# Patient Record
Sex: FEMALE | Race: WHITE | Employment: OTHER
[De-identification: names, ages, dates, MRNs, and addresses within clinical notes are randomized per-mention and may not be internally consistent; named-entity substitution may affect disease eponyms.]

---

## 2017-01-26 DIAGNOSIS — I10 HTN (HYPERTENSION): ICD-10-CM

## 2017-01-26 RX ORDER — PROPRANOLOL HYDROCHLORIDE 60 MG/1
TABLET ORAL
Qty: 90 TABLET | Refills: 0 | Status: SHIPPED | OUTPATIENT
Start: 2017-01-26 | End: 2017-03-07

## 2017-02-07 ENCOUNTER — TELEPHONE (OUTPATIENT)
Dept: FAMILY MEDICINE CLINIC | Facility: CLINIC | Age: 82
End: 2017-02-07

## 2017-02-20 DIAGNOSIS — R44.3 HALLUCINATIONS: ICD-10-CM

## 2017-02-20 RX ORDER — OLANZAPINE 5 MG/1
TABLET ORAL
Qty: 90 TABLET | Refills: 1 | Status: SHIPPED | OUTPATIENT
Start: 2017-02-20 | End: 2017-05-23 | Stop reason: SDUPTHER

## 2017-03-07 ENCOUNTER — OFFICE VISIT (OUTPATIENT)
Dept: FAMILY MEDICINE CLINIC | Facility: CLINIC | Age: 82
End: 2017-03-07

## 2017-03-07 VITALS
WEIGHT: 150 LBS | HEIGHT: 66 IN | SYSTOLIC BLOOD PRESSURE: 118 MMHG | DIASTOLIC BLOOD PRESSURE: 82 MMHG | OXYGEN SATURATION: 96 % | BODY MASS INDEX: 24.11 KG/M2 | HEART RATE: 59 BPM | TEMPERATURE: 96.3 F

## 2017-03-07 DIAGNOSIS — E55.9 VITAMIN D DEFICIENCY: ICD-10-CM

## 2017-03-07 DIAGNOSIS — I10 ESSENTIAL HYPERTENSION: ICD-10-CM

## 2017-03-07 DIAGNOSIS — E78.2 MIXED HYPERLIPIDEMIA: Primary | ICD-10-CM

## 2017-03-07 DIAGNOSIS — E03.9 HYPOTHYROIDISM, UNSPECIFIED TYPE: ICD-10-CM

## 2017-03-07 PROCEDURE — G8427 DOCREV CUR MEDS BY ELIG CLIN: HCPCS | Performed by: NURSE PRACTITIONER

## 2017-03-07 PROCEDURE — 1036F TOBACCO NON-USER: CPT | Performed by: NURSE PRACTITIONER

## 2017-03-07 PROCEDURE — G8484 FLU IMMUNIZE NO ADMIN: HCPCS | Performed by: NURSE PRACTITIONER

## 2017-03-07 PROCEDURE — G8420 CALC BMI NORM PARAMETERS: HCPCS | Performed by: NURSE PRACTITIONER

## 2017-03-07 PROCEDURE — 4040F PNEUMOC VAC/ADMIN/RCVD: CPT | Performed by: NURSE PRACTITIONER

## 2017-03-07 PROCEDURE — G8399 PT W/DXA RESULTS DOCUMENT: HCPCS | Performed by: NURSE PRACTITIONER

## 2017-03-07 PROCEDURE — 1090F PRES/ABSN URINE INCON ASSESS: CPT | Performed by: NURSE PRACTITIONER

## 2017-03-07 PROCEDURE — 99213 OFFICE O/P EST LOW 20 MIN: CPT | Performed by: NURSE PRACTITIONER

## 2017-03-07 PROCEDURE — 1123F ACP DISCUSS/DSCN MKR DOCD: CPT | Performed by: NURSE PRACTITIONER

## 2017-03-07 ASSESSMENT — ENCOUNTER SYMPTOMS
SHORTNESS OF BREATH: 0
ABDOMINAL PAIN: 0
NAUSEA: 0
CHEST TIGHTNESS: 0
VOMITING: 0
COUGH: 0

## 2017-03-14 ENCOUNTER — APPOINTMENT (OUTPATIENT)
Dept: GENERAL RADIOLOGY | Age: 82
End: 2017-03-14
Payer: MEDICARE

## 2017-03-14 ENCOUNTER — HOSPITAL ENCOUNTER (EMERGENCY)
Age: 82
Discharge: HOME | End: 2017-03-14
Attending: EMERGENCY MEDICINE
Payer: MEDICARE

## 2017-03-14 ENCOUNTER — APPOINTMENT (OUTPATIENT)
Dept: CT IMAGING | Age: 82
End: 2017-03-14
Payer: MEDICARE

## 2017-03-14 VITALS
WEIGHT: 150 LBS | TEMPERATURE: 97.8 F | HEART RATE: 79 BPM | BODY MASS INDEX: 24.11 KG/M2 | DIASTOLIC BLOOD PRESSURE: 67 MMHG | OXYGEN SATURATION: 98 % | SYSTOLIC BLOOD PRESSURE: 138 MMHG | RESPIRATION RATE: 16 BRPM | HEIGHT: 66 IN

## 2017-03-14 DIAGNOSIS — K63.89 COLON DISTENTION: ICD-10-CM

## 2017-03-14 DIAGNOSIS — K20.90 ESOPHAGITIS: Primary | ICD-10-CM

## 2017-03-14 DIAGNOSIS — K80.50 BILIARY COLIC: ICD-10-CM

## 2017-03-14 LAB
% CKMB: 5.8 % (ref 0–3)
ABSOLUTE EOS #: 0 K/UL (ref 0–0.4)
ABSOLUTE LYMPH #: 1.1 K/UL (ref 1–4.8)
ABSOLUTE MONO #: 0.7 K/UL (ref 0.2–0.8)
ALBUMIN SERPL-MCNC: 3.8 G/DL (ref 3.5–5.2)
ALBUMIN/GLOBULIN RATIO: ABNORMAL (ref 1–2.5)
ALP BLD-CCNC: 76 U/L (ref 35–104)
ALT SERPL-CCNC: 66 U/L (ref 5–33)
AMYLASE: 90 U/L (ref 28–100)
ANION GAP SERPL CALCULATED.3IONS-SCNC: 14 MMOL/L (ref 9–17)
AST SERPL-CCNC: 37 U/L
BASOPHILS # BLD: 0 % (ref 0–2)
BASOPHILS ABSOLUTE: 0 K/UL (ref 0–0.2)
BILIRUB SERPL-MCNC: 0.3 MG/DL (ref 0.3–1.2)
BILIRUBIN DIRECT: <0.08 MG/DL
BILIRUBIN URINE: NEGATIVE
BILIRUBIN, INDIRECT: ABNORMAL MG/DL (ref 0–1)
BUN BLDV-MCNC: 25 MG/DL (ref 8–23)
BUN/CREAT BLD: 36 (ref 9–20)
CALCIUM SERPL-MCNC: 10.5 MG/DL (ref 8.6–10.4)
CHLORIDE BLD-SCNC: 103 MMOL/L (ref 98–107)
CK MB: 2.3 NG/ML
CKMB INTERPRETATION: ABNORMAL
CO2: 20 MMOL/L (ref 20–31)
COLOR: YELLOW
COMMENT UA: NORMAL
CREAT SERPL-MCNC: 0.7 MG/DL (ref 0.5–0.9)
DIFFERENTIAL TYPE: ABNORMAL
EOSINOPHILS RELATIVE PERCENT: 0 % (ref 1–4)
GFR AFRICAN AMERICAN: >60 ML/MIN
GFR NON-AFRICAN AMERICAN: >60 ML/MIN
GFR SERPL CREATININE-BSD FRML MDRD: ABNORMAL ML/MIN/{1.73_M2}
GFR SERPL CREATININE-BSD FRML MDRD: ABNORMAL ML/MIN/{1.73_M2}
GLOBULIN: ABNORMAL G/DL (ref 1.5–3.8)
GLUCOSE BLD-MCNC: 145 MG/DL (ref 70–99)
GLUCOSE URINE: NEGATIVE
HCT VFR BLD CALC: 40.3 % (ref 36–46)
HEMOGLOBIN: 13.7 G/DL (ref 12–16)
KETONES, URINE: NEGATIVE
LACTIC ACID: 0.7 MMOL/L (ref 0.5–2.2)
LEUKOCYTE ESTERASE, URINE: NEGATIVE
LIPASE: 24 U/L (ref 13–60)
LYMPHOCYTES # BLD: 18 % (ref 24–44)
MCH RBC QN AUTO: 32.3 PG (ref 26–34)
MCHC RBC AUTO-ENTMCNC: 33.9 G/DL (ref 31–37)
MCV RBC AUTO: 95.2 FL (ref 80–100)
MONOCYTES # BLD: 11 % (ref 1–7)
MYOGLOBIN: 40 NG/ML (ref 25–58)
NITRITE, URINE: NEGATIVE
PDW BLD-RTO: 12.6 % (ref 11.5–14.5)
PH UA: 5 (ref 5–8)
PLATELET # BLD: 219 K/UL (ref 130–400)
PLATELET ESTIMATE: ABNORMAL
PMV BLD AUTO: 8.1 FL (ref 6–12)
POTASSIUM SERPL-SCNC: 5 MMOL/L (ref 3.7–5.3)
PROTEIN UA: NEGATIVE
RBC # BLD: 4.24 M/UL (ref 4–5.2)
RBC # BLD: ABNORMAL 10*6/UL
SEG NEUTROPHILS: 71 % (ref 36–66)
SEGMENTED NEUTROPHILS ABSOLUTE COUNT: 4.5 K/UL (ref 1.8–7.7)
SODIUM BLD-SCNC: 137 MMOL/L (ref 135–144)
SPECIFIC GRAVITY UA: 1.01 (ref 1–1.03)
TOTAL CK: 40 U/L (ref 26–192)
TOTAL PROTEIN: 6.7 G/DL (ref 6.4–8.3)
TROPONIN INTERP: NORMAL
TROPONIN T: <0.03 NG/ML
TURBIDITY: CLEAR
URINE HGB: NEGATIVE
UROBILINOGEN, URINE: NORMAL
WBC # BLD: 6.3 K/UL (ref 3.5–11)
WBC # BLD: ABNORMAL 10*3/UL

## 2017-03-14 PROCEDURE — 83690 ASSAY OF LIPASE: CPT

## 2017-03-14 PROCEDURE — 74176 CT ABD & PELVIS W/O CONTRAST: CPT

## 2017-03-14 PROCEDURE — 96374 THER/PROPH/DIAG INJ IV PUSH: CPT

## 2017-03-14 PROCEDURE — 6370000000 HC RX 637 (ALT 250 FOR IP): Performed by: EMERGENCY MEDICINE

## 2017-03-14 PROCEDURE — 84484 ASSAY OF TROPONIN QUANT: CPT

## 2017-03-14 PROCEDURE — 83874 ASSAY OF MYOGLOBIN: CPT

## 2017-03-14 PROCEDURE — 71010 XR CHEST PORTABLE: CPT

## 2017-03-14 PROCEDURE — 85025 COMPLETE CBC W/AUTO DIFF WBC: CPT

## 2017-03-14 PROCEDURE — C9113 INJ PANTOPRAZOLE SODIUM, VIA: HCPCS | Performed by: EMERGENCY MEDICINE

## 2017-03-14 PROCEDURE — 83605 ASSAY OF LACTIC ACID: CPT

## 2017-03-14 PROCEDURE — 2580000003 HC RX 258: Performed by: EMERGENCY MEDICINE

## 2017-03-14 PROCEDURE — 82550 ASSAY OF CK (CPK): CPT

## 2017-03-14 PROCEDURE — 81003 URINALYSIS AUTO W/O SCOPE: CPT

## 2017-03-14 PROCEDURE — 82553 CREATINE MB FRACTION: CPT

## 2017-03-14 PROCEDURE — 82150 ASSAY OF AMYLASE: CPT

## 2017-03-14 PROCEDURE — 80048 BASIC METABOLIC PNL TOTAL CA: CPT

## 2017-03-14 PROCEDURE — 93005 ELECTROCARDIOGRAM TRACING: CPT

## 2017-03-14 PROCEDURE — 6360000002 HC RX W HCPCS: Performed by: EMERGENCY MEDICINE

## 2017-03-14 PROCEDURE — 96375 TX/PRO/DX INJ NEW DRUG ADDON: CPT

## 2017-03-14 PROCEDURE — 99285 EMERGENCY DEPT VISIT HI MDM: CPT

## 2017-03-14 PROCEDURE — 87086 URINE CULTURE/COLONY COUNT: CPT

## 2017-03-14 PROCEDURE — 80076 HEPATIC FUNCTION PANEL: CPT

## 2017-03-14 RX ORDER — ALUMINA, MAGNESIA, AND SIMETHICONE 2400; 2400; 240 MG/30ML; MG/30ML; MG/30ML
30 SUSPENSION ORAL EVERY 6 HOURS
Qty: 1 BOTTLE | Refills: 0 | Status: SHIPPED | OUTPATIENT
Start: 2017-03-14 | End: 2017-05-23 | Stop reason: ALTCHOICE

## 2017-03-14 RX ORDER — HYDRALAZINE HYDROCHLORIDE 20 MG/ML
5 INJECTION INTRAMUSCULAR; INTRAVENOUS ONCE
Status: COMPLETED | OUTPATIENT
Start: 2017-03-14 | End: 2017-03-14

## 2017-03-14 RX ORDER — 0.9 % SODIUM CHLORIDE 0.9 %
10 VIAL (ML) INJECTION ONCE
Status: COMPLETED | OUTPATIENT
Start: 2017-03-14 | End: 2017-03-14

## 2017-03-14 RX ORDER — MORPHINE SULFATE 4 MG/ML
4 INJECTION, SOLUTION INTRAMUSCULAR; INTRAVENOUS ONCE
Status: COMPLETED | OUTPATIENT
Start: 2017-03-14 | End: 2017-03-14

## 2017-03-14 RX ORDER — OMEPRAZOLE 40 MG/1
40 CAPSULE, DELAYED RELEASE ORAL DAILY
Qty: 30 CAPSULE | Refills: 0 | Status: SHIPPED | OUTPATIENT
Start: 2017-03-14 | End: 2017-05-23 | Stop reason: ALTCHOICE

## 2017-03-14 RX ORDER — ONDANSETRON 2 MG/ML
4 INJECTION INTRAMUSCULAR; INTRAVENOUS ONCE
Status: COMPLETED | OUTPATIENT
Start: 2017-03-14 | End: 2017-03-14

## 2017-03-14 RX ORDER — SUCRALFATE ORAL 1 G/10ML
1 SUSPENSION ORAL 4 TIMES DAILY
Qty: 1200 ML | Refills: 3 | Status: SHIPPED | OUTPATIENT
Start: 2017-03-14 | End: 2017-05-23 | Stop reason: ALTCHOICE

## 2017-03-14 RX ORDER — SUCRALFATE ORAL 1 G/10ML
1 SUSPENSION ORAL ONCE
Status: COMPLETED | OUTPATIENT
Start: 2017-03-14 | End: 2017-03-14

## 2017-03-14 RX ORDER — PANTOPRAZOLE SODIUM 40 MG/10ML
40 INJECTION, POWDER, LYOPHILIZED, FOR SOLUTION INTRAVENOUS ONCE
Status: COMPLETED | OUTPATIENT
Start: 2017-03-14 | End: 2017-03-14

## 2017-03-14 RX ORDER — ONDANSETRON 4 MG/1
4 TABLET, ORALLY DISINTEGRATING ORAL EVERY 8 HOURS PRN
Qty: 20 TABLET | Refills: 0 | Status: SHIPPED | OUTPATIENT
Start: 2017-03-14 | End: 2017-05-23 | Stop reason: ALTCHOICE

## 2017-03-14 RX ADMIN — HYDRALAZINE HYDROCHLORIDE 5 MG: 20 INJECTION INTRAMUSCULAR; INTRAVENOUS at 05:54

## 2017-03-14 RX ADMIN — ONDANSETRON 4 MG: 2 INJECTION INTRAMUSCULAR; INTRAVENOUS at 05:53

## 2017-03-14 RX ADMIN — Medication 30 ML: at 06:56

## 2017-03-14 RX ADMIN — PANTOPRAZOLE SODIUM 40 MG: 40 INJECTION, POWDER, FOR SOLUTION INTRAVENOUS at 05:53

## 2017-03-14 RX ADMIN — MORPHINE SULFATE 4 MG: 4 INJECTION, SOLUTION INTRAMUSCULAR; INTRAVENOUS at 05:53

## 2017-03-14 RX ADMIN — Medication 10 ML: at 05:53

## 2017-03-14 RX ADMIN — SUCRALFATE 1 G: 1 SUSPENSION ORAL at 06:56

## 2017-03-14 ASSESSMENT — ENCOUNTER SYMPTOMS
NAUSEA: 1
ABDOMINAL PAIN: 1
RESPIRATORY NEGATIVE: 1

## 2017-03-14 ASSESSMENT — PAIN SCALES - GENERAL
PAINLEVEL_OUTOF10: 4
PAINLEVEL_OUTOF10: 2
PAINLEVEL_OUTOF10: 4

## 2017-03-15 LAB
CULTURE: NORMAL
CULTURE: NORMAL
Lab: NORMAL
SPECIMEN DESCRIPTION: NORMAL
SPECIMEN DESCRIPTION: NORMAL
STATUS: NORMAL

## 2017-03-16 LAB
EKG ATRIAL RATE: 74 BPM
EKG P AXIS: 75 DEGREES
EKG P-R INTERVAL: 160 MS
EKG Q-T INTERVAL: 356 MS
EKG QRS DURATION: 90 MS
EKG QTC CALCULATION (BAZETT): 395 MS
EKG R AXIS: 60 DEGREES
EKG T AXIS: 59 DEGREES
EKG VENTRICULAR RATE: 74 BPM

## 2017-03-28 DIAGNOSIS — E03.9 HYPOTHYROID: ICD-10-CM

## 2017-03-28 RX ORDER — LEVOTHYROXINE SODIUM 0.12 MG/1
TABLET ORAL
Qty: 90 TABLET | Refills: 0 | Status: SHIPPED | OUTPATIENT
Start: 2017-03-28 | End: 2017-05-23 | Stop reason: SDUPTHER

## 2017-04-17 DIAGNOSIS — E78.2 MIXED HYPERLIPIDEMIA: ICD-10-CM

## 2017-04-18 RX ORDER — ATORVASTATIN CALCIUM 10 MG/1
TABLET, FILM COATED ORAL
Qty: 90 TABLET | Refills: 0 | Status: SHIPPED | OUTPATIENT
Start: 2017-04-18 | End: 2017-07-20 | Stop reason: SDUPTHER

## 2017-05-01 DIAGNOSIS — I10 HTN (HYPERTENSION): ICD-10-CM

## 2017-05-02 RX ORDER — PROPRANOLOL HYDROCHLORIDE 60 MG/1
TABLET ORAL
Qty: 90 TABLET | Refills: 0 | Status: SHIPPED | OUTPATIENT
Start: 2017-05-02 | End: 2017-05-23 | Stop reason: SDUPTHER

## 2017-05-23 ENCOUNTER — OFFICE VISIT (OUTPATIENT)
Dept: FAMILY MEDICINE CLINIC | Age: 82
End: 2017-05-23
Payer: MEDICARE

## 2017-05-23 VITALS
HEART RATE: 60 BPM | SYSTOLIC BLOOD PRESSURE: 116 MMHG | DIASTOLIC BLOOD PRESSURE: 67 MMHG | TEMPERATURE: 97.4 F | WEIGHT: 146.6 LBS | HEIGHT: 66 IN | BODY MASS INDEX: 23.56 KG/M2 | OXYGEN SATURATION: 98 %

## 2017-05-23 DIAGNOSIS — C44.519 BASAL CELL CARCINOMA OF BACK: ICD-10-CM

## 2017-05-23 DIAGNOSIS — R44.1 HALLUCINATIONS, VISUAL: ICD-10-CM

## 2017-05-23 DIAGNOSIS — E53.8 VITAMIN B12 DEFICIENCY: ICD-10-CM

## 2017-05-23 DIAGNOSIS — I10 ESSENTIAL HYPERTENSION: ICD-10-CM

## 2017-05-23 DIAGNOSIS — E55.9 VITAMIN D DEFICIENCY: ICD-10-CM

## 2017-05-23 DIAGNOSIS — Z00.00 MEDICARE ANNUAL WELLNESS VISIT, SUBSEQUENT: Primary | ICD-10-CM

## 2017-05-23 DIAGNOSIS — Z23 NEED FOR VACCINATION WITH 13-POLYVALENT PNEUMOCOCCAL CONJUGATE VACCINE: ICD-10-CM

## 2017-05-23 DIAGNOSIS — E78.2 MIXED HYPERLIPIDEMIA: ICD-10-CM

## 2017-05-23 DIAGNOSIS — Z87.19 HISTORY OF GALLSTONES: ICD-10-CM

## 2017-05-23 DIAGNOSIS — E03.9 HYPOTHYROIDISM, UNSPECIFIED TYPE: ICD-10-CM

## 2017-05-23 DIAGNOSIS — M19.012 ARTHRITIS OF LEFT SHOULDER REGION: ICD-10-CM

## 2017-05-23 DIAGNOSIS — J44.9 COPD, MILD (HCC): ICD-10-CM

## 2017-05-23 PROCEDURE — G0009 ADMIN PNEUMOCOCCAL VACCINE: HCPCS | Performed by: NURSE PRACTITIONER

## 2017-05-23 PROCEDURE — G0439 PPPS, SUBSEQ VISIT: HCPCS | Performed by: NURSE PRACTITIONER

## 2017-05-23 PROCEDURE — 90670 PCV13 VACCINE IM: CPT | Performed by: NURSE PRACTITIONER

## 2017-05-23 RX ORDER — PROPRANOLOL HYDROCHLORIDE 60 MG/1
TABLET ORAL
Qty: 90 TABLET | Refills: 3 | Status: SHIPPED | OUTPATIENT
Start: 2017-05-23 | End: 2017-12-07 | Stop reason: ALTCHOICE

## 2017-05-23 RX ORDER — BENZTROPINE MESYLATE 0.5 MG/1
TABLET ORAL
Qty: 180 TABLET | Refills: 3 | Status: SHIPPED | OUTPATIENT
Start: 2017-05-23 | End: 2018-07-01 | Stop reason: SDUPTHER

## 2017-05-23 RX ORDER — LEVOTHYROXINE SODIUM 0.12 MG/1
TABLET ORAL
Qty: 90 TABLET | Refills: 3 | Status: SHIPPED | OUTPATIENT
Start: 2017-05-23 | End: 2018-07-01 | Stop reason: SDUPTHER

## 2017-05-23 RX ORDER — OLANZAPINE 5 MG/1
TABLET ORAL
Qty: 90 TABLET | Refills: 3 | Status: SHIPPED | OUTPATIENT
Start: 2017-05-23 | End: 2017-12-07

## 2017-05-23 ASSESSMENT — ENCOUNTER SYMPTOMS
CONSTIPATION: 0
COUGH: 0
EYE PAIN: 0
BACK PAIN: 0
WHEEZING: 0
BLOOD IN STOOL: 0
ABDOMINAL PAIN: 0
DIARRHEA: 0
SHORTNESS OF BREATH: 0

## 2017-05-23 ASSESSMENT — PATIENT HEALTH QUESTIONNAIRE - PHQ9: SUM OF ALL RESPONSES TO PHQ QUESTIONS 1-9: 0

## 2017-05-23 ASSESSMENT — ANXIETY QUESTIONNAIRES: GAD7 TOTAL SCORE: 0

## 2017-07-20 DIAGNOSIS — E78.2 MIXED HYPERLIPIDEMIA: ICD-10-CM

## 2017-07-20 RX ORDER — ATORVASTATIN CALCIUM 10 MG/1
TABLET, FILM COATED ORAL
Qty: 90 TABLET | Refills: 0 | Status: SHIPPED | OUTPATIENT
Start: 2017-07-20 | End: 2017-10-15 | Stop reason: SDUPTHER

## 2017-08-31 ENCOUNTER — OFFICE VISIT (OUTPATIENT)
Dept: FAMILY MEDICINE CLINIC | Age: 82
End: 2017-08-31
Payer: MEDICARE

## 2017-08-31 VITALS
HEART RATE: 62 BPM | OXYGEN SATURATION: 98 % | DIASTOLIC BLOOD PRESSURE: 62 MMHG | BODY MASS INDEX: 23.56 KG/M2 | TEMPERATURE: 98.1 F | RESPIRATION RATE: 18 BRPM | SYSTOLIC BLOOD PRESSURE: 98 MMHG | WEIGHT: 146.61 LBS | HEIGHT: 66 IN

## 2017-08-31 DIAGNOSIS — R30.0 DYSURIA: ICD-10-CM

## 2017-08-31 DIAGNOSIS — N39.0 URINARY TRACT INFECTION WITH HEMATURIA, SITE UNSPECIFIED: Primary | ICD-10-CM

## 2017-08-31 DIAGNOSIS — R31.9 URINARY TRACT INFECTION WITH HEMATURIA, SITE UNSPECIFIED: Primary | ICD-10-CM

## 2017-08-31 LAB
BILIRUBIN, POC: ABNORMAL
BLOOD URINE, POC: ABNORMAL
CLARITY, POC: ABNORMAL
COLOR, POC: YELLOW
GLUCOSE URINE, POC: ABNORMAL
KETONES, POC: ABNORMAL
LEUKOCYTE EST, POC: ABNORMAL
NITRITE, POC: POSITIVE
PH, POC: 5.5
PROTEIN, POC: ABNORMAL
SPECIFIC GRAVITY, POC: 1.01
UROBILINOGEN, POC: 0.2

## 2017-08-31 PROCEDURE — 1090F PRES/ABSN URINE INCON ASSESS: CPT | Performed by: NURSE PRACTITIONER

## 2017-08-31 PROCEDURE — 99213 OFFICE O/P EST LOW 20 MIN: CPT | Performed by: NURSE PRACTITIONER

## 2017-08-31 PROCEDURE — G8399 PT W/DXA RESULTS DOCUMENT: HCPCS | Performed by: NURSE PRACTITIONER

## 2017-08-31 PROCEDURE — 1036F TOBACCO NON-USER: CPT | Performed by: NURSE PRACTITIONER

## 2017-08-31 PROCEDURE — G8420 CALC BMI NORM PARAMETERS: HCPCS | Performed by: NURSE PRACTITIONER

## 2017-08-31 PROCEDURE — 81003 URINALYSIS AUTO W/O SCOPE: CPT | Performed by: NURSE PRACTITIONER

## 2017-08-31 PROCEDURE — 4040F PNEUMOC VAC/ADMIN/RCVD: CPT | Performed by: NURSE PRACTITIONER

## 2017-08-31 PROCEDURE — 1123F ACP DISCUSS/DSCN MKR DOCD: CPT | Performed by: NURSE PRACTITIONER

## 2017-08-31 PROCEDURE — G8427 DOCREV CUR MEDS BY ELIG CLIN: HCPCS | Performed by: NURSE PRACTITIONER

## 2017-08-31 RX ORDER — CIPROFLOXACIN 500 MG/1
500 TABLET, FILM COATED ORAL 2 TIMES DAILY
Qty: 14 TABLET | Refills: 0 | Status: SHIPPED | OUTPATIENT
Start: 2017-08-31 | End: 2017-09-07

## 2017-08-31 ASSESSMENT — ENCOUNTER SYMPTOMS
BLOOD IN STOOL: 0
ABDOMINAL PAIN: 0
ABDOMINAL DISTENTION: 0
WHEEZING: 0
VOMITING: 0
CHEST TIGHTNESS: 0
COUGH: 0
NAUSEA: 0
SHORTNESS OF BREATH: 0

## 2017-11-21 ENCOUNTER — TELEPHONE (OUTPATIENT)
Dept: FAMILY MEDICINE CLINIC | Age: 82
End: 2017-11-21

## 2017-11-24 ENCOUNTER — TELEPHONE (OUTPATIENT)
Dept: FAMILY MEDICINE CLINIC | Age: 82
End: 2017-11-24

## 2017-11-24 NOTE — TELEPHONE ENCOUNTER
Jose Roberto Ritter RN from [facility] called to report they would be following pt since her hospital stay and to give vitals today. /58 Pulse 58    Pt has follow up with the office scheduled for 11/30/2017.

## 2017-11-28 ENCOUNTER — TELEPHONE (OUTPATIENT)
Dept: FAMILY MEDICINE CLINIC | Age: 82
End: 2017-11-28

## 2017-12-07 ENCOUNTER — OFFICE VISIT (OUTPATIENT)
Dept: FAMILY MEDICINE CLINIC | Age: 82
End: 2017-12-07
Payer: MEDICARE

## 2017-12-07 VITALS
SYSTOLIC BLOOD PRESSURE: 137 MMHG | OXYGEN SATURATION: 96 % | HEIGHT: 66 IN | WEIGHT: 137.8 LBS | HEART RATE: 69 BPM | DIASTOLIC BLOOD PRESSURE: 72 MMHG | TEMPERATURE: 95.9 F | BODY MASS INDEX: 22.14 KG/M2

## 2017-12-07 DIAGNOSIS — K29.80 DUODENITIS: ICD-10-CM

## 2017-12-07 DIAGNOSIS — I10 ESSENTIAL HYPERTENSION: Primary | ICD-10-CM

## 2017-12-07 PROCEDURE — 99213 OFFICE O/P EST LOW 20 MIN: CPT | Performed by: NURSE PRACTITIONER

## 2017-12-07 PROCEDURE — G8427 DOCREV CUR MEDS BY ELIG CLIN: HCPCS | Performed by: NURSE PRACTITIONER

## 2017-12-07 PROCEDURE — G8420 CALC BMI NORM PARAMETERS: HCPCS | Performed by: NURSE PRACTITIONER

## 2017-12-07 PROCEDURE — 1036F TOBACCO NON-USER: CPT | Performed by: NURSE PRACTITIONER

## 2017-12-07 PROCEDURE — 4040F PNEUMOC VAC/ADMIN/RCVD: CPT | Performed by: NURSE PRACTITIONER

## 2017-12-07 PROCEDURE — 1123F ACP DISCUSS/DSCN MKR DOCD: CPT | Performed by: NURSE PRACTITIONER

## 2017-12-07 PROCEDURE — 1111F DSCHRG MED/CURRENT MED MERGE: CPT | Performed by: NURSE PRACTITIONER

## 2017-12-07 PROCEDURE — G8484 FLU IMMUNIZE NO ADMIN: HCPCS | Performed by: NURSE PRACTITIONER

## 2017-12-07 PROCEDURE — 1090F PRES/ABSN URINE INCON ASSESS: CPT | Performed by: NURSE PRACTITIONER

## 2017-12-07 PROCEDURE — G8399 PT W/DXA RESULTS DOCUMENT: HCPCS | Performed by: NURSE PRACTITIONER

## 2017-12-07 RX ORDER — PANTOPRAZOLE SODIUM 40 MG/1
TABLET, DELAYED RELEASE ORAL
Refills: 0 | COMMUNITY
Start: 2017-11-25 | End: 2017-12-20 | Stop reason: SDUPTHER

## 2017-12-07 RX ORDER — FOSINOPRIL SODIUM 10 MG/1
10 TABLET ORAL DAILY
Qty: 30 TABLET | Refills: 3 | Status: SHIPPED | OUTPATIENT
Start: 2017-12-07 | End: 2018-01-16 | Stop reason: DRUGHIGH

## 2017-12-07 ASSESSMENT — ENCOUNTER SYMPTOMS
DIARRHEA: 0
NAUSEA: 0
SHORTNESS OF BREATH: 0
VOMITING: 0
COUGH: 0
CONSTIPATION: 1
ABDOMINAL PAIN: 0
BLOOD IN STOOL: 0
CHEST TIGHTNESS: 0

## 2017-12-07 NOTE — PROGRESS NOTES
Visit Information    Have you changed or started any medications since your last visit including any over-the-counter medicines, vitamins, or herbal medicines? no   Have you stopped taking any of your medications? Is so, why? -  no  Are you having any side effects from any of your medications? - no    Have you seen any other physician or provider since your last visit?  no   Have you had any other diagnostic tests since your last visit?  no   Have you been seen in the emergency room and/or had an admission in a hospital since we last saw you?  no   Have you had your routine dental cleaning in the past 6 months?  no     Do you have an active MyChart account? If no, what is the barrier?   No: na    Patient Care Team:  Tomas Cannon CNP as PCP - General (Nurse Practitioner)    Medical History Review  Past Medical, Family, and Social History reviewed and  contribute to the patient presenting condition    Health Maintenance   Topic Date Due    DTaP/Tdap/Td vaccine (1 - Tdap) 04[birth year]16/1955    Pneumococcal low/med risk (2 of 2 - PPSV23) 05/23/2018    Zostavax vaccine  Addressed    DEXA (modify frequency per FRAX score)  Addressed    Flu vaccine  Completed
Physical Exam   Constitutional: She is oriented to person, place, and time. She appears well-developed and well-nourished. No distress. HENT:   Head: Normocephalic and atraumatic. Neck: Neck supple. Cardiovascular: Normal rate, regular rhythm, normal heart sounds and intact distal pulses. No LE edema   Pulmonary/Chest: Effort normal and breath sounds normal.   Neurological: She is alert and oriented to person, place, and time. Skin: Skin is warm and dry. She is not diaphoretic. Psychiatric: She has a normal mood and affect. Her behavior is normal. Judgment and thought content normal.   Nursing note and vitals reviewed. Assessment:      1. Essential hypertension  fosinopril (MONOPRIL) 10 MG tablet    GA DISCHARGE MEDS RECONCILED W/ CURRENT OUTPATIENT MED LIST   2. Duodenitis  GA DISCHARGE MEDS RECONCILED W/ CURRENT OUTPATIENT MED LIST     BP Readings from Last 3 Encounters:   12/07/17 137/72   08/31/17 98/62   05/23/17 116/67       Plan:      Return in about 2 weeks (around 12/21/2017) for return for bp check. Orders Placed This Encounter   Procedures    GA DISCHARGE MEDS RECONCILED W/ CURRENT OUTPATIENT MED LIST     Orders Placed This Encounter   Medications    fosinopril (MONOPRIL) 10 MG tablet     Sig: Take 1 tablet by mouth daily     Dispense:  30 tablet     Refill:  3   continue to avoid BB, cut back on ACE for more stable BP, recheck 2 weeks here at office  Protein shakes given to take at least daily for protein and calories. Rise slowly and adeq. Water intake throughout the day  Continue other meds and supplements, labs due in spring. Patient given educational materials - see patient instructions. Discussed use, benefit, and side effects of prescribed medications. All patient questions answered. Pt voiced understanding. Reviewed health maintenance. Instructed to continue current medications, diet and exercise.     Electronically signed by Sarah Lynne CNP on 12/7/2017 at

## 2017-12-20 ENCOUNTER — OFFICE VISIT (OUTPATIENT)
Dept: FAMILY MEDICINE CLINIC | Age: 82
End: 2017-12-20
Payer: MEDICARE

## 2017-12-20 VITALS
HEIGHT: 66 IN | BODY MASS INDEX: 22.31 KG/M2 | OXYGEN SATURATION: 98 % | TEMPERATURE: 96.6 F | SYSTOLIC BLOOD PRESSURE: 132 MMHG | HEART RATE: 63 BPM | DIASTOLIC BLOOD PRESSURE: 60 MMHG | WEIGHT: 138.8 LBS

## 2017-12-20 DIAGNOSIS — I10 ESSENTIAL HYPERTENSION: Primary | ICD-10-CM

## 2017-12-20 DIAGNOSIS — K21.9 GASTROESOPHAGEAL REFLUX DISEASE WITHOUT ESOPHAGITIS: ICD-10-CM

## 2017-12-20 PROCEDURE — 1036F TOBACCO NON-USER: CPT | Performed by: NURSE PRACTITIONER

## 2017-12-20 PROCEDURE — 1123F ACP DISCUSS/DSCN MKR DOCD: CPT | Performed by: NURSE PRACTITIONER

## 2017-12-20 PROCEDURE — G8420 CALC BMI NORM PARAMETERS: HCPCS | Performed by: NURSE PRACTITIONER

## 2017-12-20 PROCEDURE — 1090F PRES/ABSN URINE INCON ASSESS: CPT | Performed by: NURSE PRACTITIONER

## 2017-12-20 PROCEDURE — 99213 OFFICE O/P EST LOW 20 MIN: CPT | Performed by: NURSE PRACTITIONER

## 2017-12-20 PROCEDURE — 4040F PNEUMOC VAC/ADMIN/RCVD: CPT | Performed by: NURSE PRACTITIONER

## 2017-12-20 PROCEDURE — G8484 FLU IMMUNIZE NO ADMIN: HCPCS | Performed by: NURSE PRACTITIONER

## 2017-12-20 PROCEDURE — G8427 DOCREV CUR MEDS BY ELIG CLIN: HCPCS | Performed by: NURSE PRACTITIONER

## 2017-12-20 PROCEDURE — G8399 PT W/DXA RESULTS DOCUMENT: HCPCS | Performed by: NURSE PRACTITIONER

## 2017-12-20 RX ORDER — PANTOPRAZOLE SODIUM 40 MG/1
TABLET, DELAYED RELEASE ORAL
Qty: 30 TABLET | Refills: 5 | Status: SHIPPED | OUTPATIENT
Start: 2017-12-20 | End: 2018-04-16 | Stop reason: SDUPTHER

## 2017-12-20 ASSESSMENT — ENCOUNTER SYMPTOMS
VOMITING: 0
ABDOMINAL PAIN: 0
NAUSEA: 0
COUGH: 0
SHORTNESS OF BREATH: 0
CHEST TIGHTNESS: 0

## 2017-12-20 NOTE — PROGRESS NOTES
[facility] \[facility] [place] [facility]\"" - [place]  [address]  [place], [address] [place]  [phone]      Jyothi Gates is a 81 y.o. female who presents today for her  medical conditions/complaints as noted below. Jyothi Gates is c/o of Hypertension (BP ck)  . HPI:   Pt here for BP check. Has been monitoring bp at home and seems to fluctuate a lot. Does not always sit down or rest arm when taking BP. Tends to cross her legs a lot. Taking monopril only but unsure which dose. Has not been taking propanolol for past month since leaving hospital. No chest pain, sob, chest pain, headaches or sweats. Would like more samples of protein drinks if any available. Would like refill on PPI, tolerating and controlling heartburn symptoms.          Past Medical History:   Diagnosis Date    Anemia     Gastroesophageal reflux disease without esophagitis 12/20/2017    Hyperlipidemia     Hypertension     Hypothyroidism     TIA (transient ischemic attack)       Past Surgical History:   Procedure Laterality Date    TONSILLECTOMY      age 16     Family History   Problem Relation Age of Onset    Cancer Mother      breast    Other Father      ulcers     Social History   Substance Use Topics    Smoking status: Former Smoker     Quit date: 7/16/1970    Smokeless tobacco: Never Used    Alcohol use No      Current Outpatient Prescriptions   Medication Sig Dispense Refill    pantoprazole (PROTONIX) 40 MG tablet TK 1 T PO QD 30 tablet 5    fosinopril (MONOPRIL) 10 MG tablet Take 1 tablet by mouth daily 30 tablet 3    atorvastatin (LIPITOR) 10 MG tablet TAKE 1 TABLET BY MOUTH EVERY DAY 90 tablet 1    levothyroxine (SYNTHROID) 125 MCG tablet TAKE 1 TABLET BY MOUTH EVERY DAY 90 tablet 3    benztropine (COGENTIN) 0.5 MG tablet TAKE 1 TABLET BY MOUTH TWICE DAILY 180 tablet 3    Biotin 5000 MCG CAPS Take by mouth      Pyridoxine HCl (VITAMIN B-6) 100 MG tablet Take 100 mg by mouth daily      Cranberry-Vitamin C-Vitamin E

## 2017-12-21 ENCOUNTER — TELEPHONE (OUTPATIENT)
Dept: FAMILY MEDICINE CLINIC | Age: 82
End: 2017-12-21

## 2018-01-16 ENCOUNTER — OFFICE VISIT (OUTPATIENT)
Dept: FAMILY MEDICINE CLINIC | Age: 83
End: 2018-01-16
Payer: MEDICARE

## 2018-01-16 VITALS
WEIGHT: 139.8 LBS | TEMPERATURE: 97.3 F | HEART RATE: 69 BPM | OXYGEN SATURATION: 97 % | BODY MASS INDEX: 22.47 KG/M2 | HEIGHT: 66 IN | SYSTOLIC BLOOD PRESSURE: 140 MMHG | DIASTOLIC BLOOD PRESSURE: 64 MMHG

## 2018-01-16 DIAGNOSIS — J42 CHRONIC BRONCHITIS, UNSPECIFIED CHRONIC BRONCHITIS TYPE (HCC): ICD-10-CM

## 2018-01-16 DIAGNOSIS — I10 ESSENTIAL HYPERTENSION: Primary | ICD-10-CM

## 2018-01-16 PROCEDURE — 1036F TOBACCO NON-USER: CPT | Performed by: NURSE PRACTITIONER

## 2018-01-16 PROCEDURE — 4040F PNEUMOC VAC/ADMIN/RCVD: CPT | Performed by: NURSE PRACTITIONER

## 2018-01-16 PROCEDURE — G8484 FLU IMMUNIZE NO ADMIN: HCPCS | Performed by: NURSE PRACTITIONER

## 2018-01-16 PROCEDURE — G8399 PT W/DXA RESULTS DOCUMENT: HCPCS | Performed by: NURSE PRACTITIONER

## 2018-01-16 PROCEDURE — 1090F PRES/ABSN URINE INCON ASSESS: CPT | Performed by: NURSE PRACTITIONER

## 2018-01-16 PROCEDURE — G8427 DOCREV CUR MEDS BY ELIG CLIN: HCPCS | Performed by: NURSE PRACTITIONER

## 2018-01-16 PROCEDURE — 3023F SPIROM DOC REV: CPT | Performed by: NURSE PRACTITIONER

## 2018-01-16 PROCEDURE — 1123F ACP DISCUSS/DSCN MKR DOCD: CPT | Performed by: NURSE PRACTITIONER

## 2018-01-16 PROCEDURE — G8420 CALC BMI NORM PARAMETERS: HCPCS | Performed by: NURSE PRACTITIONER

## 2018-01-16 PROCEDURE — 99213 OFFICE O/P EST LOW 20 MIN: CPT | Performed by: NURSE PRACTITIONER

## 2018-01-16 PROCEDURE — G8926 SPIRO NO PERF OR DOC: HCPCS | Performed by: NURSE PRACTITIONER

## 2018-01-16 ASSESSMENT — ENCOUNTER SYMPTOMS
BLURRED VISION: 0
CHEST TIGHTNESS: 0
NAUSEA: 0
VOMITING: 0
SHORTNESS OF BREATH: 0
ABDOMINAL PAIN: 0
COUGH: 0

## 2018-01-16 NOTE — PROGRESS NOTES
atorvastatin (LIPITOR) 10 MG tablet TAKE 1 TABLET BY MOUTH EVERY DAY 90 tablet 1    levothyroxine (SYNTHROID) 125 MCG tablet TAKE 1 TABLET BY MOUTH EVERY DAY 90 tablet 3    benztropine (COGENTIN) 0.5 MG tablet TAKE 1 TABLET BY MOUTH TWICE DAILY 180 tablet 3    Biotin 5000 MCG CAPS Take by mouth      Pyridoxine HCl (VITAMIN B-6) 100 MG tablet Take 100 mg by mouth daily      Cranberry-Vitamin C-Vitamin E (CRANBERRY PLUS VITAMIN C PO) Take by mouth      Cholecalciferol (VITAMIN D3) 2000 UNITS CAPS Take by mouth daily      Aspirin Buf,[id number], (BUFFERED ASPIRIN) 325 MG TABS Take by mouth daily      vitamin E 400 UNIT capsule Take 400 Units by mouth daily      Cyanocobalamin (B-12) 1000 MCG SUBL Place under the tongue      Nutritional Supplements (OSTEO ADVANCE PO) Take by mouth      folic acid (FOLVITE) 804 MCG tablet Take 800 mcg by mouth daily      Calcium Carbonate-Vit D-Min (CALCIUM 1200 PO) Take by mouth      Omega-3 Fatty Acids (FISH OIL) 1200 MG CAPS Take by mouth      Multiple Vitamins-Minerals (CENTRUM SILVER PO) Take by mouth      ferrous sulfate 325 (65 FE) MG tablet Take 1 tablet by mouth daily (with breakfast) 90 tablet 3     No current facility-administered medications for this visit. Allergies   Allergen Reactions    Gluten Meal     Penicillins        Health Maintenance   Topic Date Due    DTaP/Tdap/Td vaccine (1 - Tdap) 04/16/1955    TSH testing  03/22/2017    Potassium monitoring  03/14/2018    Creatinine monitoring  03/14/2018    Pneumococcal low/med risk (2 of 2 - PPSV23) 05/23/2018    Zostavax vaccine  Addressed    DEXA (modify frequency per FRAX score)  Addressed    Flu vaccine  Completed       Subjective:      Review of Systems   Constitutional: Negative for appetite change, chills, diaphoresis, fatigue, fever and malaise/fatigue. Eyes: Negative for blurred vision and visual disturbance.    Respiratory: Negative for cough, chest tightness and shortness of understanding. Reviewed health maintenance. Instructed to continue current medications, diet and exercise.     Electronically signed by Noreen Lynne CNP on 1/16/2018 at 2:16 PM

## 2018-01-17 LAB
ALBUMIN SERPL-MCNC: 3.9 G/DL
ALP BLD-CCNC: 75 U/L
ALT SERPL-CCNC: 37 U/L
ANION GAP SERPL CALCULATED.3IONS-SCNC: NORMAL MMOL/L
AST SERPL-CCNC: 30 U/L
BASOPHILS ABSOLUTE: NORMAL /ΜL
BASOPHILS RELATIVE PERCENT: NORMAL %
BILIRUB SERPL-MCNC: 0.4 MG/DL (ref 0.1–1.4)
BUN BLDV-MCNC: 24 MG/DL
CALCIUM SERPL-MCNC: 10.2 MG/DL
CHLORIDE BLD-SCNC: 109 MMOL/L
CHOLESTEROL, TOTAL: 125 MG/DL
CHOLESTEROL/HDL RATIO: 2.7
CO2: 24 MMOL/L
CREAT SERPL-MCNC: 0.77 MG/DL
EOSINOPHILS ABSOLUTE: NORMAL /ΜL
EOSINOPHILS RELATIVE PERCENT: NORMAL %
GFR CALCULATED: NORMAL
GLUCOSE BLD-MCNC: 106 MG/DL
HCT VFR BLD CALC: NORMAL % (ref 36–46)
HDLC SERPL-MCNC: 46 MG/DL (ref 35–70)
HEMOGLOBIN: NORMAL G/DL (ref 12–16)
LDL CHOLESTEROL CALCULATED: 59 MG/DL (ref 0–160)
LYMPHOCYTES ABSOLUTE: NORMAL /ΜL
LYMPHOCYTES RELATIVE PERCENT: NORMAL %
MCH RBC QN AUTO: NORMAL PG
MCHC RBC AUTO-ENTMCNC: NORMAL G/DL
MCV RBC AUTO: NORMAL FL
MONOCYTES ABSOLUTE: NORMAL /ΜL
MONOCYTES RELATIVE PERCENT: NORMAL %
NEUTROPHILS ABSOLUTE: NORMAL /ΜL
NEUTROPHILS RELATIVE PERCENT: NORMAL %
PDW BLD-RTO: NORMAL %
PLATELET # BLD: NORMAL K/ΜL
PMV BLD AUTO: NORMAL FL
POTASSIUM SERPL-SCNC: 4.2 MMOL/L
RBC # BLD: NORMAL 10^6/ΜL
SODIUM BLD-SCNC: 141 MMOL/L
T4 FREE: 1.28
TOTAL PROTEIN: 6.8
TRIGL SERPL-MCNC: 98 MG/DL
TSH SERPL DL<=0.05 MIU/L-ACNC: 0.5 UIU/ML
VITAMIN D 25-HYDROXY: 28.4
VITAMIN D2, 25 HYDROXY: NORMAL
VITAMIN D3,25 HYDROXY: NORMAL
VLDLC SERPL CALC-MCNC: 20 MG/DL
WBC # BLD: NORMAL 10^3/ML

## 2018-01-18 DIAGNOSIS — I10 ESSENTIAL HYPERTENSION: ICD-10-CM

## 2018-01-18 DIAGNOSIS — E03.9 HYPOTHYROIDISM, UNSPECIFIED TYPE: ICD-10-CM

## 2018-01-18 DIAGNOSIS — E78.2 MIXED HYPERLIPIDEMIA: ICD-10-CM

## 2018-01-18 DIAGNOSIS — E55.9 VITAMIN D DEFICIENCY: ICD-10-CM

## 2018-07-02 RX ORDER — LEVOTHYROXINE SODIUM 0.12 MG/1
TABLET ORAL
Qty: 90 TABLET | Refills: 0 | Status: SHIPPED | OUTPATIENT
Start: 2018-07-02 | End: 2018-09-23 | Stop reason: SDUPTHER

## 2018-07-02 RX ORDER — BENZTROPINE MESYLATE 0.5 MG/1
TABLET ORAL
Qty: 180 TABLET | Refills: 0 | Status: SHIPPED | OUTPATIENT
Start: 2018-07-02 | End: 2018-09-23 | Stop reason: SDUPTHER

## 2018-07-28 ENCOUNTER — OFFICE VISIT (OUTPATIENT)
Dept: FAMILY MEDICINE CLINIC | Age: 83
End: 2018-07-28
Payer: MEDICARE

## 2018-07-28 ENCOUNTER — HOSPITAL ENCOUNTER (OUTPATIENT)
Age: 83
Setting detail: SPECIMEN
Discharge: HOME | End: 2018-07-28
Payer: MEDICARE

## 2018-07-28 VITALS
HEIGHT: 65 IN | HEART RATE: 83 BPM | TEMPERATURE: 98.1 F | SYSTOLIC BLOOD PRESSURE: 108 MMHG | DIASTOLIC BLOOD PRESSURE: 70 MMHG | OXYGEN SATURATION: 97 % | RESPIRATION RATE: 18 BRPM | WEIGHT: 150 LBS | BODY MASS INDEX: 24.99 KG/M2

## 2018-07-28 DIAGNOSIS — N30.01 ACUTE CYSTITIS WITH HEMATURIA: ICD-10-CM

## 2018-07-28 DIAGNOSIS — R35.0 URINE FREQUENCY: Primary | ICD-10-CM

## 2018-07-28 LAB
BILIRUBIN, POC: ABNORMAL
BLOOD URINE, POC: ABNORMAL
CLARITY, POC: ABNORMAL
COLOR, POC: YELLOW
GLUCOSE URINE, POC: ABNORMAL
KETONES, POC: ABNORMAL
LEUKOCYTE EST, POC: ABNORMAL
NITRITE, POC: ABNORMAL
PH, POC: 5.5
PROTEIN, POC: ABNORMAL
SPECIFIC GRAVITY, POC: 1
UROBILINOGEN, POC: 0.2

## 2018-07-28 PROCEDURE — 81002 URINALYSIS NONAUTO W/O SCOPE: CPT | Performed by: NURSE PRACTITIONER

## 2018-07-28 PROCEDURE — 99213 OFFICE O/P EST LOW 20 MIN: CPT | Performed by: NURSE PRACTITIONER

## 2018-07-28 RX ORDER — SULFAMETHOXAZOLE AND TRIMETHOPRIM 800; 160 MG/1; MG/1
1 TABLET ORAL 2 TIMES DAILY
Qty: 20 TABLET | Refills: 0 | Status: SHIPPED | OUTPATIENT
Start: 2018-07-28 | End: 2018-08-07

## 2018-07-28 ASSESSMENT — PATIENT HEALTH QUESTIONNAIRE - PHQ9
2. FEELING DOWN, DEPRESSED OR HOPELESS: 0
SUM OF ALL RESPONSES TO PHQ QUESTIONS 1-9: 0
SUM OF ALL RESPONSES TO PHQ9 QUESTIONS 1 & 2: 0
1. LITTLE INTEREST OR PLEASURE IN DOING THINGS: 0

## 2018-07-28 ASSESSMENT — ENCOUNTER SYMPTOMS
NAUSEA: 0
VOMITING: 0

## 2018-07-28 NOTE — PROGRESS NOTES
[address] [facility] [address]  Dept: [phone]  Dept Fax: [phone]    Teressa Shah is a 81 y.o. female who presents to the urgent care today for her medical conditions/complaints as noted below. Teressa Shah is c/o of Urinary Tract Infection (this morning pt noticed cloudy,bubbly urine and burning )      HPI:     Last uti 11 months ago. Hx mutliple uti's, takes cranberry bid as preventative, symptoms started this morning, same sx as in past. Came n right away before symptoms worsened. Urinary Tract Infection    This is a new problem. The current episode started today. The problem occurs every urination. The problem has been gradually worsening. The quality of the pain is described as burning. The pain is at a severity of 4/10. The pain is mild. There has been no fever. Associated symptoms include frequency and urgency. Pertinent negatives include no chills, discharge, flank pain, hematuria, hesitancy, nausea, sweats or vomiting. She has tried nothing for the symptoms. Her past medical history is significant for recurrent UTIs.        Past Medical History:   Diagnosis Date    Anemia     Gastroesophageal reflux disease without esophagitis 12/20/2017    Hyperlipidemia     Hypertension     Hypothyroidism     TIA (transient ischemic attack)         Current Outpatient Prescriptions   Medication Sig Dispense Refill    sulfamethoxazole-trimethoprim (BACTRIM DS) 800-160 MG per tablet Take 1 tablet by mouth 2 times daily for 10 days 20 tablet 0    benztropine (COGENTIN) 0.5 MG tablet TAKE 1 TABLET BY MOUTH TWICE DAILY 180 tablet 0    levothyroxine (SYNTHROID) 125 MCG tablet TAKE 1 TABLET BY MOUTH EVERY DAY 90 tablet 0    atorvastatin (LIPITOR) 10 MG tablet TAKE 1 TABLET BY MOUTH EVERY DAY 90 tablet 3    fosinopril (MONOPRIL) 20 MG tablet TAKE 1 TABLET BY MOUTH EVERY DAY 90 tablet 3    pantoprazole (PROTONIX) 40 MG

## 2018-07-28 NOTE — PATIENT INSTRUCTIONS
Return worse  Push fluids, keep hydrated    Patient Education        Urinary Tract Infection in Women: Care Instructions  Your Care Instructions    A urinary tract infection, or UTI, is a general term for an infection anywhere between the kidneys and the urethra (where urine comes out). Most UTIs are bladder infections. They often cause pain or burning when you urinate. UTIs are caused by bacteria and can be cured with antibiotics. Be sure to complete your treatment so that the infection goes away. Follow-up care is a key part of your treatment and safety. Be sure to make and go to all appointments, and call your doctor if you are having problems. It's also a good idea to know your test results and keep a list of the medicines you take. How can you care for yourself at home? · Take your antibiotics as directed. Do not stop taking them just because you feel better. You need to take the full course of antibiotics. · Drink extra water and other fluids for the next day or two. This may help wash out the bacteria that are causing the infection. (If you have kidney, heart, or liver disease and have to limit fluids, talk with your doctor before you increase your fluid intake.)  · Avoid drinks that are carbonated or have caffeine. They can irritate the bladder. · Urinate often. Try to empty your bladder each time. · To relieve pain, take a hot bath or lay a heating pad set on low over your lower belly or genital area. Never go to sleep with a heating pad in place. To prevent UTIs  · Drink plenty of water each day. This helps you urinate often, which clears bacteria from your system. (If you have kidney, heart, or liver disease and have to limit fluids, talk with your doctor before you increase your fluid intake.)  · Urinate when you need to. · Urinate right after you have sex. · Change sanitary pads often.   · Avoid douches, bubble baths, feminine hygiene sprays, and other feminine hygiene products that have

## 2018-07-29 DIAGNOSIS — N30.01 ACUTE CYSTITIS WITH HEMATURIA: ICD-10-CM

## 2018-07-31 LAB
CULTURE: ABNORMAL
Lab: ABNORMAL
ORGANISM: ABNORMAL
SPECIMEN DESCRIPTION: ABNORMAL
STATUS: ABNORMAL

## 2018-08-02 ENCOUNTER — TELEPHONE (OUTPATIENT)
Dept: FAMILY MEDICINE CLINIC | Age: 83
End: 2018-08-02

## 2018-08-02 DIAGNOSIS — E55.9 VITAMIN D DEFICIENCY: ICD-10-CM

## 2018-08-02 DIAGNOSIS — D72.819 LEUKOPENIA, UNSPECIFIED TYPE: Primary | ICD-10-CM

## 2018-08-02 DIAGNOSIS — E53.8 VITAMIN B12 DEFICIENCY: ICD-10-CM

## 2018-08-06 LAB
BASOPHILS ABSOLUTE: 0 /ΜL
BASOPHILS RELATIVE PERCENT: 0.6 %
EOSINOPHILS ABSOLUTE: 0.1 /ΜL
EOSINOPHILS RELATIVE PERCENT: 1.9 %
HCT VFR BLD CALC: 35.9 % (ref 36–46)
HEMOGLOBIN: 12.4 G/DL (ref 12–16)
LYMPHOCYTES ABSOLUTE: 1.6 /ΜL
LYMPHOCYTES RELATIVE PERCENT: 35.7 %
MCH RBC QN AUTO: 33.2 PG
MCHC RBC AUTO-ENTMCNC: 34.5 G/DL
MCV RBC AUTO: 96 FL
MONOCYTES ABSOLUTE: 0.6 /ΜL
MONOCYTES RELATIVE PERCENT: 13.8 %
NEUTROPHILS ABSOLUTE: 2.1 /ΜL
NEUTROPHILS RELATIVE PERCENT: 48 %
PDW BLD-RTO: 12.3 %
PLATELET # BLD: 241 K/ΜL
PMV BLD AUTO: 8.3 FL
RBC # BLD: 3.73 10^6/ΜL
VITAMIN B-12: 710
VITAMIN D 25-HYDROXY: 36.8
VITAMIN D2, 25 HYDROXY: NORMAL
VITAMIN D3,25 HYDROXY: NORMAL
WBC # BLD: 4.4 10^3/ML

## 2018-08-07 DIAGNOSIS — E53.8 VITAMIN B12 DEFICIENCY: ICD-10-CM

## 2018-08-07 DIAGNOSIS — E55.9 VITAMIN D DEFICIENCY: ICD-10-CM

## 2018-08-07 DIAGNOSIS — D72.819 LEUKOPENIA, UNSPECIFIED TYPE: ICD-10-CM

## 2018-08-09 ENCOUNTER — TELEPHONE (OUTPATIENT)
Dept: FAMILY MEDICINE CLINIC | Age: 83
End: 2018-08-09

## 2018-08-14 ENCOUNTER — OFFICE VISIT (OUTPATIENT)
Dept: FAMILY MEDICINE CLINIC | Age: 83
End: 2018-08-14
Payer: MEDICARE

## 2018-08-14 VITALS
HEIGHT: 66 IN | OXYGEN SATURATION: 98 % | DIASTOLIC BLOOD PRESSURE: 62 MMHG | SYSTOLIC BLOOD PRESSURE: 114 MMHG | TEMPERATURE: 98.6 F | WEIGHT: 151 LBS | HEART RATE: 71 BPM | BODY MASS INDEX: 24.27 KG/M2

## 2018-08-14 DIAGNOSIS — R25.2 LEG CRAMPS: Primary | ICD-10-CM

## 2018-08-14 DIAGNOSIS — Z87.440 RECENT URINARY TRACT INFECTION: ICD-10-CM

## 2018-08-14 LAB
BILIRUBIN, POC: NEGATIVE
BLOOD URINE, POC: NEGATIVE
CLARITY, POC: CLEAR
COLOR, POC: YELLOW
GLUCOSE URINE, POC: NEGATIVE
KETONES, POC: NEGATIVE
LEUKOCYTE EST, POC: NEGATIVE
NITRITE, POC: NEGATIVE
PH, POC: 5.5
PROTEIN, POC: NEGATIVE
SPECIFIC GRAVITY, POC: <=1.005
UROBILINOGEN, POC: 0.2

## 2018-08-14 PROCEDURE — G8427 DOCREV CUR MEDS BY ELIG CLIN: HCPCS | Performed by: NURSE PRACTITIONER

## 2018-08-14 PROCEDURE — 81003 URINALYSIS AUTO W/O SCOPE: CPT | Performed by: NURSE PRACTITIONER

## 2018-08-14 PROCEDURE — 99214 OFFICE O/P EST MOD 30 MIN: CPT | Performed by: NURSE PRACTITIONER

## 2018-08-14 PROCEDURE — G8399 PT W/DXA RESULTS DOCUMENT: HCPCS | Performed by: NURSE PRACTITIONER

## 2018-08-14 PROCEDURE — 4040F PNEUMOC VAC/ADMIN/RCVD: CPT | Performed by: NURSE PRACTITIONER

## 2018-08-14 PROCEDURE — 1090F PRES/ABSN URINE INCON ASSESS: CPT | Performed by: NURSE PRACTITIONER

## 2018-08-14 PROCEDURE — 1123F ACP DISCUSS/DSCN MKR DOCD: CPT | Performed by: NURSE PRACTITIONER

## 2018-08-14 PROCEDURE — G8420 CALC BMI NORM PARAMETERS: HCPCS | Performed by: NURSE PRACTITIONER

## 2018-08-14 PROCEDURE — 1101F PT FALLS ASSESS-DOCD LE1/YR: CPT | Performed by: NURSE PRACTITIONER

## 2018-08-14 PROCEDURE — 1036F TOBACCO NON-USER: CPT | Performed by: NURSE PRACTITIONER

## 2018-08-14 ASSESSMENT — ENCOUNTER SYMPTOMS
SHORTNESS OF BREATH: 0
COLOR CHANGE: 0
CHEST TIGHTNESS: 0
VOMITING: 0
COUGH: 0
NAUSEA: 0
ABDOMINAL PAIN: 0
BACK PAIN: 0

## 2018-08-28 ENCOUNTER — OFFICE VISIT (OUTPATIENT)
Dept: FAMILY MEDICINE CLINIC | Age: 83
End: 2018-08-28
Payer: MEDICARE

## 2018-08-28 VITALS
HEIGHT: 66 IN | SYSTOLIC BLOOD PRESSURE: 133 MMHG | DIASTOLIC BLOOD PRESSURE: 72 MMHG | OXYGEN SATURATION: 97 % | HEART RATE: 63 BPM | TEMPERATURE: 98.6 F | WEIGHT: 151 LBS | BODY MASS INDEX: 24.27 KG/M2

## 2018-08-28 DIAGNOSIS — Z23 NEED FOR PROPHYLACTIC VACCINATION AGAINST STREPTOCOCCUS PNEUMONIAE (PNEUMOCOCCUS): ICD-10-CM

## 2018-08-28 DIAGNOSIS — M48.02 SPINAL STENOSIS OF CERVICAL REGION: ICD-10-CM

## 2018-08-28 DIAGNOSIS — R20.0 NUMBNESS OF FINGERS OF BOTH HANDS: Primary | ICD-10-CM

## 2018-08-28 DIAGNOSIS — M50.30 DEGENERATIVE DISC DISEASE, CERVICAL: ICD-10-CM

## 2018-08-28 PROCEDURE — G0009 ADMIN PNEUMOCOCCAL VACCINE: HCPCS | Performed by: NURSE PRACTITIONER

## 2018-08-28 PROCEDURE — G8420 CALC BMI NORM PARAMETERS: HCPCS | Performed by: NURSE PRACTITIONER

## 2018-08-28 PROCEDURE — 99213 OFFICE O/P EST LOW 20 MIN: CPT | Performed by: NURSE PRACTITIONER

## 2018-08-28 PROCEDURE — 1036F TOBACCO NON-USER: CPT | Performed by: NURSE PRACTITIONER

## 2018-08-28 PROCEDURE — G8399 PT W/DXA RESULTS DOCUMENT: HCPCS | Performed by: NURSE PRACTITIONER

## 2018-08-28 PROCEDURE — G8427 DOCREV CUR MEDS BY ELIG CLIN: HCPCS | Performed by: NURSE PRACTITIONER

## 2018-08-28 PROCEDURE — 1090F PRES/ABSN URINE INCON ASSESS: CPT | Performed by: NURSE PRACTITIONER

## 2018-08-28 PROCEDURE — 1123F ACP DISCUSS/DSCN MKR DOCD: CPT | Performed by: NURSE PRACTITIONER

## 2018-08-28 PROCEDURE — 90732 PPSV23 VACC 2 YRS+ SUBQ/IM: CPT | Performed by: NURSE PRACTITIONER

## 2018-08-28 PROCEDURE — 4040F PNEUMOC VAC/ADMIN/RCVD: CPT | Performed by: NURSE PRACTITIONER

## 2018-08-28 PROCEDURE — 1101F PT FALLS ASSESS-DOCD LE1/YR: CPT | Performed by: NURSE PRACTITIONER

## 2018-08-28 ASSESSMENT — ENCOUNTER SYMPTOMS
BACK PAIN: 0
SHORTNESS OF BREATH: 0

## 2018-08-28 NOTE — PROGRESS NOTES
Visit Information    Have you changed or started any medications since your last visit including any over-the-counter medicines, vitamins, or herbal medicines? no   Have you stopped taking any of your medications? Is so, why? -  no  Are you having any side effects from any of your medications? - no    Have you seen any other physician or provider since your last visit?  no   Have you had any other diagnostic tests since your last visit?  no   Have you been seen in the emergency room and/or had an admission in a hospital since we last saw you?  no   Have you had your routine dental cleaning in the past 6 months?  no     Do you have an active MyChart account? If no, what is the barrier? No: na    Patient Care Team:  MUMTAZ Aguila CNP as PCP - General (Nurse Practitioner)    Medical History Review  Past Medical, Family, and Social History reviewed and  contribute to the patient presenting condition    Health Maintenance   Topic Date Due    DTaP/Tdap/Td vaccine (1 - Tdap) 04/16/1955    Shingles Vaccine (1 of 2 - 2 Dose Series) 04/16/1986    Pneumococcal low/med risk (2 of 2 - PPSV23) 05/23/2018    Flu vaccine (1) 09/01/2018    TSH testing  01/17/2019    Potassium monitoring  01/17/2019    Creatinine monitoring  01/17/2019    DEXA (modify frequency per FRAX score)  Addressed       After obtaining consent, and per orders of Imelda Gagnon CNP, injection of Pneumo 23 given in Left deltoid by Zaki Singh. Patient instructed to remain in clinic for 20 minutes afterwards, and to report any adverse reaction to me immediately.
Cholecalciferol (VITAMIN D3) 2000 UNITS CAPS Take by mouth daily      Aspirin Buf,[id number]-[id number]-PdFow, (BUFFERED ASPIRIN) 325 MG TABS Take by mouth daily      vitamin E 400 UNIT capsule Take 400 Units by mouth daily      Cyanocobalamin (B-12) 1000 MCG SUBL Place under the tongue      Nutritional Supplements (OSTEO ADVANCE PO) Take by mouth      folic acid (FOLVITE) 046 MCG tablet Take 800 mcg by mouth daily      Calcium Carbonate-Vit D-Min (CALCIUM 1200 PO) Take by mouth      Omega-3 Fatty Acids (FISH OIL) 1200 MG CAPS Take by mouth      Multiple Vitamins-Minerals (CENTRUM SILVER PO) Take by mouth      ferrous sulfate 325 (65 FE) MG tablet Take 1 tablet by mouth daily (with breakfast) 90 tablet 3     No current facility-administered medications for this visit. Allergies   Allergen Reactions    Gluten Meal     Penicillins        Health Maintenance   Topic Date Due    DTaP/Tdap/Td vaccine (1 - Tdap) 04/16/1955    Shingles Vaccine (1 of 2 - 2 Dose Series) 04/16/1986    Pneumococcal low/med risk (2 of 2 - PPSV23) 05/23/2018    Flu vaccine (1) 09/01/2018    TSH testing  01/17/2019    Potassium monitoring  01/17/2019    Creatinine monitoring  01/17/2019    DEXA (modify frequency per FRAX score)  Addressed       Subjective:      Review of Systems   Constitutional: Positive for activity change. Negative for appetite change, fatigue and fever. Respiratory: Negative for shortness of breath. Cardiovascular: Negative for chest pain. Musculoskeletal: Positive for neck stiffness. Negative for arthralgias, back pain and neck pain. Neurological: Positive for numbness. Negative for dizziness, weakness, light-headedness and headaches. Psychiatric/Behavioral: Negative for sleep disturbance. Objective:     Physical Exam   Constitutional: She is oriented to person, place, and time. She appears well-developed and well-nourished. No distress. HENT:   Head: Normocephalic and atraumatic.    Neck:

## 2018-09-24 RX ORDER — BENZTROPINE MESYLATE 0.5 MG/1
TABLET ORAL
Qty: 180 TABLET | Refills: 0 | Status: SHIPPED | OUTPATIENT
Start: 2018-09-24 | End: 2018-12-27 | Stop reason: SDUPTHER

## 2018-09-24 RX ORDER — LEVOTHYROXINE SODIUM 0.12 MG/1
TABLET ORAL
Qty: 90 TABLET | Refills: 0 | Status: SHIPPED | OUTPATIENT
Start: 2018-09-24 | End: 2018-12-27 | Stop reason: SDUPTHER

## 2018-10-08 ENCOUNTER — HOSPITAL ENCOUNTER (OUTPATIENT)
Age: 83
Setting detail: SPECIMEN
Discharge: HOME | End: 2018-10-08
Payer: MEDICARE

## 2018-10-08 ENCOUNTER — NURSE ONLY (OUTPATIENT)
Dept: FAMILY MEDICINE CLINIC | Age: 83
End: 2018-10-08
Payer: MEDICARE

## 2018-10-08 DIAGNOSIS — R82.90 ABNORMAL URINALYSIS: ICD-10-CM

## 2018-10-08 DIAGNOSIS — R30.0 DYSURIA: Primary | ICD-10-CM

## 2018-10-08 LAB
BILIRUBIN, POC: NEGATIVE
BLOOD URINE, POC: NORMAL
CLARITY, POC: CLEAR
COLOR, POC: YELLOW
GLUCOSE URINE, POC: NEGATIVE
KETONES, POC: NEGATIVE
LEUKOCYTE EST, POC: NORMAL
NITRITE, POC: NEGATIVE
PH, POC: 5.5
PROTEIN, POC: NEGATIVE
SPECIFIC GRAVITY, POC: <=1.005
UROBILINOGEN, POC: 0.2

## 2018-10-08 PROCEDURE — 81002 URINALYSIS NONAUTO W/O SCOPE: CPT | Performed by: NURSE PRACTITIONER

## 2018-10-08 RX ORDER — NITROFURANTOIN 25; 75 MG/1; MG/1
100 CAPSULE ORAL 2 TIMES DAILY
Qty: 10 CAPSULE | Refills: 0 | Status: SHIPPED | OUTPATIENT
Start: 2018-10-08 | End: 2018-10-13

## 2018-10-10 LAB
CULTURE: ABNORMAL
Lab: ABNORMAL
ORGANISM: ABNORMAL
SPECIMEN DESCRIPTION: ABNORMAL
STATUS: ABNORMAL

## 2018-10-11 ENCOUNTER — TELEPHONE (OUTPATIENT)
Dept: FAMILY MEDICINE CLINIC | Age: 83
End: 2018-10-11

## 2018-10-11 DIAGNOSIS — Z12.39 SCREENING FOR BREAST CANCER: ICD-10-CM

## 2018-10-11 DIAGNOSIS — L65.9 HAIR LOSS: Primary | ICD-10-CM

## 2018-10-13 ENCOUNTER — OFFICE VISIT (OUTPATIENT)
Dept: FAMILY MEDICINE CLINIC | Age: 83
End: 2018-10-13
Payer: MEDICARE

## 2018-10-13 VITALS
DIASTOLIC BLOOD PRESSURE: 78 MMHG | SYSTOLIC BLOOD PRESSURE: 138 MMHG | RESPIRATION RATE: 20 BRPM | WEIGHT: 150 LBS | BODY MASS INDEX: 24.11 KG/M2 | TEMPERATURE: 98.2 F | HEART RATE: 80 BPM | OXYGEN SATURATION: 98 %

## 2018-10-13 DIAGNOSIS — Z87.440 HISTORY OF UTI: ICD-10-CM

## 2018-10-13 DIAGNOSIS — R30.0 DYSURIA: Primary | ICD-10-CM

## 2018-10-13 LAB
BILIRUBIN, POC: NORMAL
BLOOD URINE, POC: NORMAL
CLARITY, POC: CLEAR
COLOR, POC: YELLOW
GLUCOSE URINE, POC: NORMAL
KETONES, POC: NORMAL
LEUKOCYTE EST, POC: NORMAL
NITRITE, POC: NORMAL
PH, POC: 5.5
PROTEIN, POC: NORMAL
SPECIFIC GRAVITY, POC: <=1.005
UROBILINOGEN, POC: 0.2

## 2018-10-13 PROCEDURE — G8399 PT W/DXA RESULTS DOCUMENT: HCPCS | Performed by: NURSE PRACTITIONER

## 2018-10-13 PROCEDURE — 1123F ACP DISCUSS/DSCN MKR DOCD: CPT | Performed by: NURSE PRACTITIONER

## 2018-10-13 PROCEDURE — 81002 URINALYSIS NONAUTO W/O SCOPE: CPT | Performed by: NURSE PRACTITIONER

## 2018-10-13 PROCEDURE — G8420 CALC BMI NORM PARAMETERS: HCPCS | Performed by: NURSE PRACTITIONER

## 2018-10-13 PROCEDURE — 1036F TOBACCO NON-USER: CPT | Performed by: NURSE PRACTITIONER

## 2018-10-13 PROCEDURE — 1101F PT FALLS ASSESS-DOCD LE1/YR: CPT | Performed by: NURSE PRACTITIONER

## 2018-10-13 PROCEDURE — 99213 OFFICE O/P EST LOW 20 MIN: CPT | Performed by: NURSE PRACTITIONER

## 2018-10-13 PROCEDURE — G8482 FLU IMMUNIZE ORDER/ADMIN: HCPCS | Performed by: NURSE PRACTITIONER

## 2018-10-13 PROCEDURE — 4040F PNEUMOC VAC/ADMIN/RCVD: CPT | Performed by: NURSE PRACTITIONER

## 2018-10-13 PROCEDURE — 1090F PRES/ABSN URINE INCON ASSESS: CPT | Performed by: NURSE PRACTITIONER

## 2018-10-13 PROCEDURE — G8427 DOCREV CUR MEDS BY ELIG CLIN: HCPCS | Performed by: NURSE PRACTITIONER

## 2018-10-13 RX ORDER — CEPHALEXIN 500 MG/1
500 CAPSULE ORAL 2 TIMES DAILY
Qty: 14 CAPSULE | Refills: 0 | Status: SHIPPED | OUTPATIENT
Start: 2018-10-13 | End: 2018-10-20

## 2018-10-13 ASSESSMENT — ENCOUNTER SYMPTOMS
COUGH: 0
RESPIRATORY NEGATIVE: 1
SHORTNESS OF BREATH: 0
VOMITING: 0
CONSTIPATION: 0
WHEEZING: 0
DIARRHEA: 0
NAUSEA: 0
CHEST TIGHTNESS: 0

## 2018-10-13 NOTE — PROGRESS NOTES
[address] [facility] [address]  Dept: [phone]  Dept Fax: [phone]    Radha Moss is a 81 y.o. female who presents to the urgent care today for her medicalconditions/complaints as noted below. Radha Moss is c/o of Dysuria    HPI:     Urinary Tract Infection    This is a new problem. Episode onset: 5 days ago. The quality of the pain is described as burning. There has been no fever. Pertinent negatives include no chills, flank pain, frequency, hematuria, hesitancy, nausea, urgency or vomiting. Treatments tried: macrobid. The treatment provided no relief. Her past medical history is significant for recurrent UTIs (last UTI was in 7/2018, prior to UTI she was treated for on 10/8). Started [employer] on 10/8 for UTI. Prior to that, she was treated for a UTI in July 2018 with Bactrim. States that she has taken Macrobid in the past for UTI and it did not work well for her symptoms.     Past Medical History:   Diagnosis Date    Anemia     Gastroesophageal reflux disease without esophagitis 12/20/2017    Hyperlipidemia     Hypertension     Hypothyroidism     TIA (transient ischemic attack)       Current Outpatient Prescriptions   Medication Sig Dispense Refill    cephALEXin (KEFLEX) 500 MG capsule Take 1 capsule by mouth 2 times daily for 7 days 14 capsule 0    nitrofurantoin, macrocrystal-monohydrate, (MACROBID) 100 MG capsule Take 1 capsule by mouth 2 times daily for 5 days 10 capsule 0    levothyroxine (SYNTHROID) 125 MCG tablet TAKE 1 TABLET BY MOUTH EVERY DAY 90 tablet 0    benztropine (COGENTIN) 0.5 MG tablet TAKE 1 TABLET BY MOUTH TWICE DAILY 180 tablet 0    atorvastatin (LIPITOR) 10 MG tablet TAKE 1 TABLET BY MOUTH EVERY DAY 90 tablet 3    fosinopril (MONOPRIL) 20 MG tablet TAKE 1 TABLET BY MOUTH EVERY DAY 90 tablet 3    pantoprazole (PROTONIX) 40 MG tablet TAKE 1 TABLET BY MOUTH EVERY DAY 90 tablet 5    Effort normal and breath sounds normal. No respiratory distress. She has no wheezes. Abdominal: She exhibits no distension. There is no tenderness. There is no CVA tenderness. Neurological: She is alert. Skin: Skin is warm and dry. She is not diaphoretic. Nursing note and vitals reviewed. /78   Pulse 80   Temp 98.2 °F (36.8 °C) (Tympanic)   Resp 20   Wt 150 lb (68 kg)   SpO2 98%   BMI 24.11 kg/m²     Results for orders placed or performed in visit on 10/13/18   POCT Urinalysis no Micro   Result Value Ref Range    Color, UA yellow     Clarity, UA clear     Glucose, UA POC neg     Bilirubin, UA neg     Ketones, UA neg     Spec Grav, UA <=1.005     Blood, UA POC neg     pH, UA 5.5     Protein, UA POC neg     Urobilinogen, UA 0.2     Leukocytes, UA neg     Nitrite, UA neg      Assessment:       Diagnosis Orders   1. Dysuria  POCT Urinalysis no Micro    cephALEXin (KEFLEX) 500 MG capsule   2. History of UTI       Plan: Will treat with keflex at this time despite the negative UA in the office, due to the severity of the symptoms and prior + urinalysis. Patient instructed to complete entire antibiotic course. Increase fluid intake. Educational materials regarding UTI given on AVS.  Patient agreeable to treatment plan. Follow up if symptoms do not improve/worsen. Orders Placed This Encounter   Medications    cephALEXin (KEFLEX) 500 MG capsule     Sig: Take 1 capsule by mouth 2 times daily for 7 days     Dispense:  14 capsule     Refill:  0        Patient given educational materials - see patient instructions. Discussed use, benefit, and side effects of prescribed medications. All patientquestions answered. Pt voiced understanding.     Electronically signed by MUMTAZ Valles CNP on 10/13/2018at 2:12 PM

## 2018-10-16 ENCOUNTER — OFFICE VISIT (OUTPATIENT)
Dept: FAMILY MEDICINE CLINIC | Age: 83
End: 2018-10-16
Payer: MEDICARE

## 2018-10-16 VITALS
TEMPERATURE: 97.9 F | WEIGHT: 150.2 LBS | SYSTOLIC BLOOD PRESSURE: 122 MMHG | HEART RATE: 66 BPM | BODY MASS INDEX: 25.64 KG/M2 | HEIGHT: 64 IN | DIASTOLIC BLOOD PRESSURE: 64 MMHG | OXYGEN SATURATION: 97 %

## 2018-10-16 DIAGNOSIS — E78.2 MIXED HYPERLIPIDEMIA: ICD-10-CM

## 2018-10-16 DIAGNOSIS — I10 ESSENTIAL HYPERTENSION: Primary | ICD-10-CM

## 2018-10-16 DIAGNOSIS — G56.01 CARPAL TUNNEL SYNDROME OF RIGHT WRIST: ICD-10-CM

## 2018-10-16 DIAGNOSIS — J44.9 COPD, MILD (HCC): ICD-10-CM

## 2018-10-16 DIAGNOSIS — M19.031 PRIMARY OSTEOARTHRITIS OF RIGHT WRIST: ICD-10-CM

## 2018-10-16 DIAGNOSIS — R73.9 HYPERGLYCEMIA: ICD-10-CM

## 2018-10-16 DIAGNOSIS — M25.731 CARPAL BOSS OF RIGHT WRIST: ICD-10-CM

## 2018-10-16 DIAGNOSIS — M19.012 ARTHRITIS OF LEFT SHOULDER REGION: ICD-10-CM

## 2018-10-16 PROBLEM — K29.80 DUODENITIS: Status: ACTIVE | Noted: 2017-11-20

## 2018-10-16 PROBLEM — K90.0 CELIAC DISEASE: Status: ACTIVE | Noted: 2018-02-02

## 2018-10-16 LAB — HBA1C MFR BLD: 6.2 %

## 2018-10-16 PROCEDURE — 99214 OFFICE O/P EST MOD 30 MIN: CPT | Performed by: INTERNAL MEDICINE

## 2018-10-16 PROCEDURE — 1123F ACP DISCUSS/DSCN MKR DOCD: CPT | Performed by: INTERNAL MEDICINE

## 2018-10-16 PROCEDURE — 1090F PRES/ABSN URINE INCON ASSESS: CPT | Performed by: INTERNAL MEDICINE

## 2018-10-16 PROCEDURE — 3023F SPIROM DOC REV: CPT | Performed by: INTERNAL MEDICINE

## 2018-10-16 PROCEDURE — G8926 SPIRO NO PERF OR DOC: HCPCS | Performed by: INTERNAL MEDICINE

## 2018-10-16 PROCEDURE — G8419 CALC BMI OUT NRM PARAM NOF/U: HCPCS | Performed by: INTERNAL MEDICINE

## 2018-10-16 PROCEDURE — G8427 DOCREV CUR MEDS BY ELIG CLIN: HCPCS | Performed by: INTERNAL MEDICINE

## 2018-10-16 PROCEDURE — 1101F PT FALLS ASSESS-DOCD LE1/YR: CPT | Performed by: INTERNAL MEDICINE

## 2018-10-16 PROCEDURE — 4040F PNEUMOC VAC/ADMIN/RCVD: CPT | Performed by: INTERNAL MEDICINE

## 2018-10-16 PROCEDURE — G8482 FLU IMMUNIZE ORDER/ADMIN: HCPCS | Performed by: INTERNAL MEDICINE

## 2018-10-16 PROCEDURE — G8399 PT W/DXA RESULTS DOCUMENT: HCPCS | Performed by: INTERNAL MEDICINE

## 2018-10-16 PROCEDURE — 1036F TOBACCO NON-USER: CPT | Performed by: INTERNAL MEDICINE

## 2018-10-16 PROCEDURE — 83036 HEMOGLOBIN GLYCOSYLATED A1C: CPT | Performed by: INTERNAL MEDICINE

## 2018-10-29 ENCOUNTER — HOSPITAL ENCOUNTER (OUTPATIENT)
Dept: MAMMOGRAPHY | Age: 83
Discharge: HOME | End: 2018-10-31
Payer: MEDICARE

## 2018-10-29 DIAGNOSIS — Z12.39 SCREENING FOR BREAST CANCER: ICD-10-CM

## 2018-10-29 PROCEDURE — 77067 SCR MAMMO BI INCL CAD: CPT

## 2018-11-13 ENCOUNTER — OFFICE VISIT (OUTPATIENT)
Dept: FAMILY MEDICINE CLINIC | Age: 83
End: 2018-11-13
Payer: MEDICARE

## 2018-11-13 VITALS
OXYGEN SATURATION: 97 % | WEIGHT: 154.2 LBS | RESPIRATION RATE: 12 BRPM | TEMPERATURE: 98.4 F | HEART RATE: 84 BPM | DIASTOLIC BLOOD PRESSURE: 64 MMHG | SYSTOLIC BLOOD PRESSURE: 122 MMHG | BODY MASS INDEX: 26.32 KG/M2 | HEIGHT: 64 IN

## 2018-11-13 DIAGNOSIS — R20.0 NUMBNESS AND TINGLING IN RIGHT HAND: ICD-10-CM

## 2018-11-13 DIAGNOSIS — M62.81 MUSCLE WEAKNESS: ICD-10-CM

## 2018-11-13 DIAGNOSIS — R20.2 NUMBNESS AND TINGLING IN RIGHT HAND: ICD-10-CM

## 2018-11-13 DIAGNOSIS — G56.01 CARPAL TUNNEL SYNDROME OF RIGHT WRIST: Primary | ICD-10-CM

## 2018-11-13 DIAGNOSIS — E03.9 HYPOTHYROIDISM, UNSPECIFIED TYPE: ICD-10-CM

## 2018-11-13 PROCEDURE — G8399 PT W/DXA RESULTS DOCUMENT: HCPCS | Performed by: INTERNAL MEDICINE

## 2018-11-13 PROCEDURE — 1090F PRES/ABSN URINE INCON ASSESS: CPT | Performed by: INTERNAL MEDICINE

## 2018-11-13 PROCEDURE — 4040F PNEUMOC VAC/ADMIN/RCVD: CPT | Performed by: INTERNAL MEDICINE

## 2018-11-13 PROCEDURE — 1036F TOBACCO NON-USER: CPT | Performed by: INTERNAL MEDICINE

## 2018-11-13 PROCEDURE — G8482 FLU IMMUNIZE ORDER/ADMIN: HCPCS | Performed by: INTERNAL MEDICINE

## 2018-11-13 PROCEDURE — 1101F PT FALLS ASSESS-DOCD LE1/YR: CPT | Performed by: INTERNAL MEDICINE

## 2018-11-13 PROCEDURE — 99213 OFFICE O/P EST LOW 20 MIN: CPT | Performed by: INTERNAL MEDICINE

## 2018-11-13 PROCEDURE — 1123F ACP DISCUSS/DSCN MKR DOCD: CPT | Performed by: INTERNAL MEDICINE

## 2018-11-13 PROCEDURE — G8419 CALC BMI OUT NRM PARAM NOF/U: HCPCS | Performed by: INTERNAL MEDICINE

## 2018-11-13 PROCEDURE — G8427 DOCREV CUR MEDS BY ELIG CLIN: HCPCS | Performed by: INTERNAL MEDICINE

## 2018-11-13 ASSESSMENT — ENCOUNTER SYMPTOMS
ABDOMINAL PAIN: 0
NAUSEA: 0
CONSTIPATION: 0
VOMITING: 0
WHEEZING: 0
CHOKING: 0
BACK PAIN: 0
SHORTNESS OF BREATH: 0
DIARRHEA: 0
STRIDOR: 0
CHEST TIGHTNESS: 0
COUGH: 0

## 2018-12-27 RX ORDER — LEVOTHYROXINE SODIUM 0.12 MG/1
TABLET ORAL
Qty: 90 TABLET | Refills: 0 | Status: SHIPPED | OUTPATIENT
Start: 2018-12-27 | End: 2019-03-26 | Stop reason: SDUPTHER

## 2018-12-27 RX ORDER — BENZTROPINE MESYLATE 0.5 MG/1
TABLET ORAL
Qty: 180 TABLET | Refills: 0 | Status: SHIPPED | OUTPATIENT
Start: 2018-12-27 | End: 2019-03-26 | Stop reason: SDUPTHER

## 2019-03-26 RX ORDER — LEVOTHYROXINE SODIUM 0.12 MG/1
TABLET ORAL
Qty: 90 TABLET | Refills: 0 | Status: SHIPPED | OUTPATIENT
Start: 2019-03-26 | End: 2019-06-28 | Stop reason: SDUPTHER

## 2019-03-26 RX ORDER — BENZTROPINE MESYLATE 0.5 MG/1
TABLET ORAL
Qty: 180 TABLET | Refills: 0 | Status: SHIPPED | OUTPATIENT
Start: 2019-03-26 | End: 2019-06-28 | Stop reason: SDUPTHER

## 2019-04-08 ENCOUNTER — HOSPITAL ENCOUNTER (OUTPATIENT)
Age: 84
Setting detail: SPECIMEN
Discharge: HOME | End: 2019-04-08
Payer: MEDICARE

## 2019-04-08 ENCOUNTER — OFFICE VISIT (OUTPATIENT)
Dept: FAMILY MEDICINE CLINIC | Age: 84
End: 2019-04-08
Payer: MEDICARE

## 2019-04-08 VITALS
SYSTOLIC BLOOD PRESSURE: 118 MMHG | HEIGHT: 64 IN | HEART RATE: 66 BPM | DIASTOLIC BLOOD PRESSURE: 66 MMHG | RESPIRATION RATE: 16 BRPM | BODY MASS INDEX: 27.14 KG/M2 | TEMPERATURE: 97.8 F | OXYGEN SATURATION: 96 % | WEIGHT: 159 LBS

## 2019-04-08 DIAGNOSIS — J44.9 COPD, MILD (HCC): ICD-10-CM

## 2019-04-08 DIAGNOSIS — E03.9 HYPOTHYROIDISM, UNSPECIFIED TYPE: ICD-10-CM

## 2019-04-08 DIAGNOSIS — R07.89 CHEST TIGHTNESS: Primary | ICD-10-CM

## 2019-04-08 DIAGNOSIS — I10 ESSENTIAL HYPERTENSION: ICD-10-CM

## 2019-04-08 DIAGNOSIS — R20.2 NUMBNESS AND TINGLING IN RIGHT HAND: ICD-10-CM

## 2019-04-08 DIAGNOSIS — I87.2 VENOUS INSUFFICIENCY: ICD-10-CM

## 2019-04-08 DIAGNOSIS — R20.0 NUMBNESS AND TINGLING IN RIGHT HAND: ICD-10-CM

## 2019-04-08 LAB
ABSOLUTE EOS #: 0.17 K/UL (ref 0–0.44)
ABSOLUTE IMMATURE GRANULOCYTE: 0.03 K/UL (ref 0–0.3)
ABSOLUTE LYMPH #: 1.84 K/UL (ref 1.1–3.7)
ABSOLUTE MONO #: 0.81 K/UL (ref 0.1–1.2)
ALBUMIN SERPL-MCNC: 4 G/DL (ref 3.5–5.2)
ALBUMIN/GLOBULIN RATIO: 1.3 (ref 1–2.5)
ALP BLD-CCNC: 60 U/L (ref 35–104)
ALT SERPL-CCNC: 20 U/L (ref 5–33)
ANION GAP SERPL CALCULATED.3IONS-SCNC: 9 MMOL/L (ref 9–17)
AST SERPL-CCNC: 18 U/L
BASOPHILS # BLD: 1 % (ref 0–2)
BASOPHILS ABSOLUTE: 0.04 K/UL (ref 0–0.2)
BILIRUB SERPL-MCNC: 0.17 MG/DL (ref 0.3–1.2)
BUN BLDV-MCNC: 19 MG/DL (ref 8–23)
BUN/CREAT BLD: ABNORMAL (ref 9–20)
CALCIUM SERPL-MCNC: 10.8 MG/DL (ref 8.6–10.4)
CHLORIDE BLD-SCNC: 102 MMOL/L (ref 98–107)
CO2: 26 MMOL/L (ref 20–31)
CREAT SERPL-MCNC: 0.73 MG/DL (ref 0.5–0.9)
DIFFERENTIAL TYPE: ABNORMAL
EOSINOPHILS RELATIVE PERCENT: 3 % (ref 1–4)
GFR AFRICAN AMERICAN: >60 ML/MIN
GFR NON-AFRICAN AMERICAN: >60 ML/MIN
GFR SERPL CREATININE-BSD FRML MDRD: ABNORMAL ML/MIN/{1.73_M2}
GFR SERPL CREATININE-BSD FRML MDRD: ABNORMAL ML/MIN/{1.73_M2}
GLUCOSE BLD-MCNC: 123 MG/DL (ref 70–99)
HCT VFR BLD CALC: 40.6 % (ref 36.3–47.1)
HEMOGLOBIN: 12.8 G/DL (ref 11.9–15.1)
IMMATURE GRANULOCYTES: 1 %
LYMPHOCYTES # BLD: 28 % (ref 24–43)
MCH RBC QN AUTO: 31 PG (ref 25.2–33.5)
MCHC RBC AUTO-ENTMCNC: 31.5 G/DL (ref 28.4–34.8)
MCV RBC AUTO: 98.3 FL (ref 82.6–102.9)
MONOCYTES # BLD: 12 % (ref 3–12)
NRBC AUTOMATED: 0 PER 100 WBC
PDW BLD-RTO: 12.2 % (ref 11.8–14.4)
PLATELET # BLD: 258 K/UL (ref 138–453)
PLATELET ESTIMATE: ABNORMAL
PMV BLD AUTO: 10.1 FL (ref 8.1–13.5)
POTASSIUM SERPL-SCNC: 4.8 MMOL/L (ref 3.7–5.3)
RBC # BLD: 4.13 M/UL (ref 3.95–5.11)
RBC # BLD: ABNORMAL 10*6/UL
SEG NEUTROPHILS: 55 % (ref 36–65)
SEGMENTED NEUTROPHILS ABSOLUTE COUNT: 3.63 K/UL (ref 1.5–8.1)
SODIUM BLD-SCNC: 137 MMOL/L (ref 135–144)
TOTAL PROTEIN: 7 G/DL (ref 6.4–8.3)
TSH SERPL DL<=0.05 MIU/L-ACNC: 1.1 MIU/L (ref 0.3–5)
WBC # BLD: 6.5 K/UL (ref 3.5–11.3)
WBC # BLD: ABNORMAL 10*3/UL

## 2019-04-08 PROCEDURE — G8926 SPIRO NO PERF OR DOC: HCPCS | Performed by: INTERNAL MEDICINE

## 2019-04-08 PROCEDURE — 1090F PRES/ABSN URINE INCON ASSESS: CPT | Performed by: INTERNAL MEDICINE

## 2019-04-08 PROCEDURE — 1123F ACP DISCUSS/DSCN MKR DOCD: CPT | Performed by: INTERNAL MEDICINE

## 2019-04-08 PROCEDURE — G8419 CALC BMI OUT NRM PARAM NOF/U: HCPCS | Performed by: INTERNAL MEDICINE

## 2019-04-08 PROCEDURE — 3023F SPIROM DOC REV: CPT | Performed by: INTERNAL MEDICINE

## 2019-04-08 PROCEDURE — 4040F PNEUMOC VAC/ADMIN/RCVD: CPT | Performed by: INTERNAL MEDICINE

## 2019-04-08 PROCEDURE — 99213 OFFICE O/P EST LOW 20 MIN: CPT | Performed by: INTERNAL MEDICINE

## 2019-04-08 PROCEDURE — G8427 DOCREV CUR MEDS BY ELIG CLIN: HCPCS | Performed by: INTERNAL MEDICINE

## 2019-04-08 PROCEDURE — 1036F TOBACCO NON-USER: CPT | Performed by: INTERNAL MEDICINE

## 2019-04-08 PROCEDURE — G8399 PT W/DXA RESULTS DOCUMENT: HCPCS | Performed by: INTERNAL MEDICINE

## 2019-04-08 PROCEDURE — 93000 ELECTROCARDIOGRAM COMPLETE: CPT | Performed by: INTERNAL MEDICINE

## 2019-04-08 ASSESSMENT — PATIENT HEALTH QUESTIONNAIRE - PHQ9
SUM OF ALL RESPONSES TO PHQ QUESTIONS 1-9: 0
SUM OF ALL RESPONSES TO PHQ9 QUESTIONS 1 & 2: 0
2. FEELING DOWN, DEPRESSED OR HOPELESS: 0
1. LITTLE INTEREST OR PLEASURE IN DOING THINGS: 0
SUM OF ALL RESPONSES TO PHQ QUESTIONS 1-9: 0

## 2019-04-09 LAB
THYROGLOBULIN AB: 1316 IU/ML (ref 0–40)
THYROID PEROXIDASE (TPO) AB: >1000 IU/ML (ref 0–35)

## 2019-04-10 DIAGNOSIS — R82.994 HYPERCALCINURIA: Primary | ICD-10-CM

## 2019-04-10 DIAGNOSIS — E83.52 HYPERCALCEMIA: ICD-10-CM

## 2019-04-11 ENCOUNTER — HOSPITAL ENCOUNTER (OUTPATIENT)
Age: 84
Setting detail: SPECIMEN
Discharge: HOME | End: 2019-04-11
Payer: MEDICARE

## 2019-04-11 DIAGNOSIS — E83.52 HYPERCALCEMIA: ICD-10-CM

## 2019-04-11 DIAGNOSIS — R82.994 HYPERCALCINURIA: ICD-10-CM

## 2019-04-11 LAB
CALCIUM IONIZED: 1.46 MMOL/L (ref 1.13–1.33)
PTH INTACT: 108.2 PG/ML (ref 15–65)
VITAMIN D 25-HYDROXY: 46.2 NG/ML (ref 30–100)

## 2019-04-11 ASSESSMENT — ENCOUNTER SYMPTOMS
SHORTNESS OF BREATH: 0
COUGH: 0
BLOOD IN STOOL: 0
DIARRHEA: 0
ABDOMINAL PAIN: 0
CONSTIPATION: 0
NAUSEA: 0
BACK PAIN: 0
VOMITING: 0
STRIDOR: 0
WHEEZING: 0
VOICE CHANGE: 0
CHEST TIGHTNESS: 0
CHOKING: 0
TROUBLE SWALLOWING: 0

## 2019-04-12 DIAGNOSIS — E83.52 HYPERCALCEMIA: ICD-10-CM

## 2019-04-12 DIAGNOSIS — R79.89 HIGH SERUM PARATHYROID HORMONE (PTH): ICD-10-CM

## 2019-04-12 DIAGNOSIS — R82.994 HYPERCALCINURIA: Primary | ICD-10-CM

## 2019-04-12 NOTE — PROGRESS NOTES
Family History   Problem Relation Age of Onset    Cancer Mother         breast    Other Father         ulcers       Social History     Tobacco Use    Smoking status: Former Smoker     Last attempt to quit: 1970     Years since quittin.7    Smokeless tobacco: Never Used   Substance Use Topics    Alcohol use: No     Alcohol/week: 0.0 oz      Current Outpatient Medications   Medication Sig Dispense Refill    benztropine (COGENTIN) 0.5 MG tablet TAKE 1 TABLET BY MOUTH TWICE DAILY 180 tablet 0    levothyroxine (SYNTHROID) 125 MCG tablet TAKE 1 TABLET BY MOUTH EVERY DAY 90 tablet 0    diclofenac sodium (VOLTAREN) 1 % GEL Apply 2 g topically 4 times daily as needed for Pain For upper extremity, use 2 gm QID. 2 Tube 11    fosinopril (MONOPRIL) 20 MG tablet TAKE 1 TABLET BY MOUTH EVERY DAY 90 tablet 3    pantoprazole (PROTONIX) 40 MG tablet TAKE 1 TABLET BY MOUTH EVERY DAY 90 tablet 5    Biotin 5000 MCG CAPS Take by mouth      Pyridoxine HCl (VITAMIN B-6) 100 MG tablet Take 100 mg by mouth daily      Cranberry-Vitamin C-Vitamin E (CRANBERRY PLUS VITAMIN C PO) Take by mouth      Cholecalciferol (VITAMIN D3) 2000 UNITS CAPS Take by mouth daily      vitamin E 400 UNIT capsule Take 400 Units by mouth daily      Cyanocobalamin (B-12) 1000 MCG SUBL Place under the tongue      Nutritional Supplements (OSTEO ADVANCE PO) Take by mouth      folic acid (FOLVITE) 695 MCG tablet Take 800 mcg by mouth daily      Calcium Carbonate-Vit D-Min (CALCIUM 1200 PO) Take by mouth      Omega-3 Fatty Acids (FISH OIL) 1200 MG CAPS Take by mouth      Multiple Vitamins-Minerals (CENTRUM SILVER PO) Take by mouth      ferrous sulfate 325 (65 FE) MG tablet Take 1 tablet by mouth daily (with breakfast) 90 tablet 3     No current facility-administered medications for this visit.       Allergies   Allergen Reactions    Gluten Meal     Penicillins           Health Maintenance   Topic Date Due    Shingles Vaccine ( of 2) 04/16/1986    DTaP/Tdap/Td vaccine (1 - Tdap) 04/08/2020 (Originally 9/10/2009)    TSH testing  04/08/2020    Potassium monitoring  04/08/2020    Creatinine monitoring  04/08/2020    Flu vaccine  Completed    Pneumococcal 65+ years Vaccine  Completed    DEXA (modify frequency per FRAX score)  Addressed       Subjective:     Review of Systems   Constitutional: Positive for activity change and fatigue. Negative for appetite change, chills, diaphoresis, fever and unexpected weight change. HENT: Negative for tinnitus, trouble swallowing and voice change. Eyes: Negative for visual disturbance. Respiratory: Negative for cough, choking, chest tightness, shortness of breath, wheezing and stridor. Cardiovascular: Negative for chest pain, palpitations and leg swelling. Gastrointestinal: Negative for abdominal pain, blood in stool, constipation, diarrhea, nausea and vomiting. Endocrine: Negative for cold intolerance, heat intolerance, polydipsia, polyphagia and polyuria. Genitourinary: Negative for difficulty urinating. Musculoskeletal: Positive for arthralgias. Negative for back pain, gait problem, joint swelling and myalgias. Skin: Negative for rash. Neurological: Negative for dizziness, facial asymmetry, speech difficulty, weakness, light-headedness, numbness and headaches. Hematological: Negative for adenopathy. Does not bruise/bleed easily. Psychiatric/Behavioral: Negative for agitation, confusion, decreased concentration, dysphoric mood, hallucinations and sleep disturbance. Objective:      Physical Exam   Constitutional: She is oriented to person, place, and time. She appears well-developed and well-nourished. No distress. HENT:   Head: Normocephalic and atraumatic. Right Ear: External ear normal.   Left Ear: External ear normal.   Nose: Nose normal.   Mouth/Throat: Oropharynx is clear and moist. No oropharyngeal exudate.    Eyes: Pupils are equal, round, and reactive to light. EOM are normal.   Neck: Normal range of motion. Neck supple. No JVD present. Thyromegaly present. Cardiovascular: Normal rate, regular rhythm, normal heart sounds and intact distal pulses. Exam reveals no gallop and no friction rub. No murmur heard. Pulmonary/Chest: Effort normal and breath sounds normal. No respiratory distress. She has no wheezes. Abdominal: Soft. Bowel sounds are normal. There is no splenomegaly or hepatomegaly. There is no tenderness. Lymphadenopathy:     She has no cervical adenopathy. Neurological: She is alert and oriented to person, place, and time. No cranial nerve deficit or sensory deficit. Coordination normal.   Skin: Skin is warm and dry. No rash noted. She is not diaphoretic. Psychiatric: She has a normal mood and affect. Her behavior is normal. Judgment and thought content normal.   Nursing note and vitals reviewed. /66 (Site: Left Upper Arm, Position: Sitting, Cuff Size: Medium Adult)   Pulse 66   Temp 97.8 °F (36.6 °C) (Tympanic)   Resp 16   Ht 5' 4\" (1.626 m)   Wt 159 lb (72.1 kg)   SpO2 96%   BMI 27.29 kg/m²     Assessment:       Diagnosis Orders   1. Numbness and tingling in right hand     2. COPD, mild ([address]     3. Hypothyroidism, unspecified type  TSH with Reflex    Thyroid Antibodies   4. Essential hypertension  CBC Auto Differential    Comprehensive Metabolic Panel   5. Chest tightness  EKG 12 Lead   6. Venous insufficiency  AFL - Blanca Dahl MD, Cardiology, [facility]:       Return if symptoms worsen or fail to improve.     Orders Placed This Encounter   Procedures    TSH with Reflex     Standing Status:   Future     Number of Occurrences:   1     Standing Expiration Date:   4/8/2020    Thyroid Antibodies     Standing Status:   Future     Number of Occurrences:   1     Standing Expiration Date:   4/8/2020    CBC Auto Differential     Standing Status:   Future     Number of Occurrences:   1     Standing Expiration Date: 4/8/2020    Comprehensive Metabolic Panel     Standing Status:   Future     Number of Occurrences:   1     Standing Expiration Date:   4/8/2020    TE Soriano MD, Cardiology, [place]     Referral Priority:   Routine     Referral Type:   Eval and Treat     Referral Reason:   Specialty Services Required     Referred to Provider:   Rob Betancourt MD     Requested Specialty:   Cardiology     Number of Visits Requested:   1    EKG 12 Lead     Order Specific Question:   Reason for Exam?     Answer:   Chest pain     No orders of the defined types were placed in this encounter. Check blood work   EKG in office okay/no changes /normal .   VITALS good as well. Pt feels it is overall improved and given no real specific sxs no further meds at this time . Seek immediate medical attention for any chest pain , severe trouble breathing and/or visual changes. Patientgiven educational materials - see patient instructions. Discussed use, benefit,and side effects of prescribed medications. All patient questions answered. Ptvoiced understanding. Reviewed health maintenance. Instructed to continue currentmedications, diet and exercise. Patient agreed with treatment plan. Follow up asdirected.      Electronically signed by Lidia Denton DO on 4/11/2019 at 10:28 PM

## 2019-05-09 RX ORDER — FOSINOPRIL SODIUM 20 MG/1
TABLET ORAL
Qty: 90 TABLET | Refills: 0 | Status: SHIPPED | OUTPATIENT
Start: 2019-05-09 | End: 2019-07-15 | Stop reason: SDUPTHER

## 2019-05-14 ENCOUNTER — OFFICE VISIT (OUTPATIENT)
Dept: FAMILY MEDICINE CLINIC | Age: 84
End: 2019-05-14
Payer: MEDICARE

## 2019-05-14 VITALS
HEART RATE: 78 BPM | SYSTOLIC BLOOD PRESSURE: 122 MMHG | DIASTOLIC BLOOD PRESSURE: 76 MMHG | HEIGHT: 66 IN | BODY MASS INDEX: 25.97 KG/M2 | OXYGEN SATURATION: 98 % | WEIGHT: 161.6 LBS

## 2019-05-14 DIAGNOSIS — E83.52 HYPERCALCEMIA: Primary | ICD-10-CM

## 2019-05-14 DIAGNOSIS — R79.89 HIGH SERUM PARATHYROID HORMONE (PTH): ICD-10-CM

## 2019-05-14 PROCEDURE — G8419 CALC BMI OUT NRM PARAM NOF/U: HCPCS | Performed by: INTERNAL MEDICINE

## 2019-05-14 PROCEDURE — 1090F PRES/ABSN URINE INCON ASSESS: CPT | Performed by: INTERNAL MEDICINE

## 2019-05-14 PROCEDURE — 4040F PNEUMOC VAC/ADMIN/RCVD: CPT | Performed by: INTERNAL MEDICINE

## 2019-05-14 PROCEDURE — G8399 PT W/DXA RESULTS DOCUMENT: HCPCS | Performed by: INTERNAL MEDICINE

## 2019-05-14 PROCEDURE — 1123F ACP DISCUSS/DSCN MKR DOCD: CPT | Performed by: INTERNAL MEDICINE

## 2019-05-14 PROCEDURE — 99213 OFFICE O/P EST LOW 20 MIN: CPT | Performed by: INTERNAL MEDICINE

## 2019-05-14 PROCEDURE — G8427 DOCREV CUR MEDS BY ELIG CLIN: HCPCS | Performed by: INTERNAL MEDICINE

## 2019-05-14 PROCEDURE — 1036F TOBACCO NON-USER: CPT | Performed by: INTERNAL MEDICINE

## 2019-05-14 ASSESSMENT — ENCOUNTER SYMPTOMS
SHORTNESS OF BREATH: 0
DIARRHEA: 0
ABDOMINAL PAIN: 0
CONSTIPATION: 0

## 2019-05-14 NOTE — PROGRESS NOTES
[facility]  \[facility]\"" [address]  [address] [address]  Dept: [phone]  Dept Fax: [phone]    Sánchez Prince a 81 y.o. female who presents today for her medical conditions/complaints as notedbelow. Veldon Meter is c/o of   Chief Complaint   Patient presents with    Hypertension     Patient is here for follow up on HTN         HPI:     Does not know why she is here   states for bp check   States all the sx she had last moth feeling weird in her body went away completely  pth was elevated on labs as well as calcium   Was not rechecked  Has not made appt nor seen endo   Takes a lot of biotin   Takes hair skin and nails and also 5000 mcg of biotin   Has never had surgery on thyroid no hx of throid disease     Hypertension   This is a chronic problem. The current episode started more than 1 year ago. The problem is unchanged. The problem is controlled. Pertinent negatives include no chest pain, headaches, palpitations or shortness of breath.        Hemoglobin A1C (%)   Date Value   10/16/2018 6.2   05/20/2016 5.5         ( goal A1C is < 7)   No results found for: LABMICR  LDL Calculated (mg/dL)   Date Value   01/17/2018 59   03/22/2016 70       (goal LDL is <100)   AST (U/L)   Date Value   04/08/2019 18     ALT (U/L)   Date Value   04/08/2019 20     BUN (mg/dL)   Date Value   04/08/2019 19     BP Readings from Last 3 Encounters:   05/14/19 122/76   04/08/19 118/66   11/13/18 122/64          (goal 120/80)    Past Medical History:   Diagnosis Date    Anemia     Gastroesophageal reflux disease without esophagitis 12/20/2017    Hyperlipidemia     Hypertension     Hypothyroidism     TIA (transient ischemic attack)       Past Surgical History:   Procedure Laterality Date    CARPAL TUNNEL RELEASE Right     TONSILLECTOMY      age 16       Family History   Problem Relation Age of Onset    Cancer Mother         breast    Other Father         ulcers Potassium monitoring  04/08/2020    Creatinine monitoring  04/08/2020    Flu vaccine  Completed    Pneumococcal 65+ years Vaccine  Completed    DEXA (modify frequency per FRAX score)  Addressed       Subjective:     Review of Systems   Constitutional: Negative for activity change, appetite change, chills, diaphoresis, fatigue, fever and unexpected weight change. Eyes: Negative for visual disturbance. Respiratory: Negative for shortness of breath. Cardiovascular: Negative for chest pain, palpitations and leg swelling. Gastrointestinal: Negative for abdominal pain, constipation and diarrhea. Endocrine: Negative for cold intolerance, heat intolerance and polydipsia. Musculoskeletal: Negative for arthralgias. Skin: Negative for rash. Neurological: Positive for numbness. Negative for dizziness, tremors, weakness, light-headedness and headaches. Hematological: Negative for adenopathy. Does not bruise/bleed easily. Psychiatric/Behavioral: Negative for confusion, decreased concentration and sleep disturbance. Objective:      Physical Exam   Constitutional: She is oriented to person, place, and time. She appears well-developed and well-nourished. HENT:   Right Ear: External ear normal.   Left Ear: External ear normal.   Nose: Nose normal.   Eyes: Pupils are equal, round, and reactive to light. EOM are normal.   Cardiovascular: Normal rate, regular rhythm and normal heart sounds. Pulmonary/Chest: Effort normal and breath sounds normal.   Abdominal: Soft. Bowel sounds are normal. There is no splenomegaly or hepatomegaly. There is no tenderness. Neurological: She is alert and oriented to person, place, and time. No cranial nerve deficit. Skin: Skin is warm and dry. No rash noted. Psychiatric: She has a normal mood and affect. Nursing note and vitals reviewed.     /76   Pulse 78   Ht 5' 6\" (1.676 m)   Wt 161 lb 9.6 oz (73.3 kg)   SpO2 98%   BMI 26.08 kg/m²     Assessment:

## 2019-06-10 ENCOUNTER — TELEPHONE (OUTPATIENT)
Dept: FAMILY MEDICINE CLINIC | Age: 84
End: 2019-06-10

## 2019-06-28 RX ORDER — LEVOTHYROXINE SODIUM 0.12 MG/1
TABLET ORAL
Qty: 90 TABLET | Refills: 0 | Status: SHIPPED | OUTPATIENT
Start: 2019-06-28 | End: 2019-07-12 | Stop reason: SDUPTHER

## 2019-06-28 RX ORDER — BENZTROPINE MESYLATE 0.5 MG/1
TABLET ORAL
Qty: 180 TABLET | Refills: 0 | Status: SHIPPED | OUTPATIENT
Start: 2019-06-28 | End: 2019-09-26 | Stop reason: SDUPTHER

## 2019-07-12 DIAGNOSIS — K21.9 GASTROESOPHAGEAL REFLUX DISEASE WITHOUT ESOPHAGITIS: ICD-10-CM

## 2019-07-14 RX ORDER — LEVOTHYROXINE SODIUM 0.12 MG/1
TABLET ORAL
Qty: 90 TABLET | Refills: 0 | Status: SHIPPED | OUTPATIENT
Start: 2019-07-14 | End: 2019-09-26 | Stop reason: SDUPTHER

## 2019-07-14 RX ORDER — PANTOPRAZOLE SODIUM 40 MG/1
TABLET, DELAYED RELEASE ORAL
Qty: 90 TABLET | Refills: 0 | Status: SHIPPED | OUTPATIENT
Start: 2019-07-14 | End: 2019-09-26 | Stop reason: SDUPTHER

## 2019-07-15 RX ORDER — FOSINOPRIL SODIUM 20 MG/1
TABLET ORAL
Qty: 90 TABLET | Refills: 0 | Status: SHIPPED | OUTPATIENT
Start: 2019-07-15 | End: 2019-11-08 | Stop reason: SDUPTHER

## 2019-07-31 ENCOUNTER — OFFICE VISIT (OUTPATIENT)
Dept: FAMILY MEDICINE CLINIC | Age: 84
End: 2019-07-31
Payer: MEDICARE

## 2019-07-31 ENCOUNTER — HOSPITAL ENCOUNTER (OUTPATIENT)
Age: 84
Setting detail: SPECIMEN
Discharge: HOME | End: 2019-07-31
Payer: MEDICARE

## 2019-07-31 VITALS
BODY MASS INDEX: 25.97 KG/M2 | DIASTOLIC BLOOD PRESSURE: 68 MMHG | HEIGHT: 66 IN | SYSTOLIC BLOOD PRESSURE: 112 MMHG | OXYGEN SATURATION: 100 % | WEIGHT: 161.6 LBS | TEMPERATURE: 98 F | RESPIRATION RATE: 18 BRPM | HEART RATE: 66 BPM

## 2019-07-31 DIAGNOSIS — N39.0 URINARY TRACT INFECTION WITHOUT HEMATURIA, SITE UNSPECIFIED: ICD-10-CM

## 2019-07-31 DIAGNOSIS — N39.0 URINARY TRACT INFECTION WITHOUT HEMATURIA, SITE UNSPECIFIED: Primary | ICD-10-CM

## 2019-07-31 DIAGNOSIS — R35.0 FREQUENCY OF URINATION: ICD-10-CM

## 2019-07-31 LAB
BILIRUBIN, POC: ABNORMAL
BLOOD URINE, POC: ABNORMAL
CLARITY, POC: ABNORMAL
COLOR, POC: YELLOW
GLUCOSE URINE, POC: ABNORMAL
KETONES, POC: ABNORMAL
LEUKOCYTE EST, POC: ABNORMAL
NITRITE, POC: ABNORMAL
PH, POC: 6
PROTEIN, POC: ABNORMAL
SPECIFIC GRAVITY, POC: 1
UROBILINOGEN, POC: 0.2

## 2019-07-31 PROCEDURE — 1036F TOBACCO NON-USER: CPT | Performed by: NURSE PRACTITIONER

## 2019-07-31 PROCEDURE — 1123F ACP DISCUSS/DSCN MKR DOCD: CPT | Performed by: NURSE PRACTITIONER

## 2019-07-31 PROCEDURE — 1090F PRES/ABSN URINE INCON ASSESS: CPT | Performed by: NURSE PRACTITIONER

## 2019-07-31 PROCEDURE — G8419 CALC BMI OUT NRM PARAM NOF/U: HCPCS | Performed by: NURSE PRACTITIONER

## 2019-07-31 PROCEDURE — G8399 PT W/DXA RESULTS DOCUMENT: HCPCS | Performed by: NURSE PRACTITIONER

## 2019-07-31 PROCEDURE — 99213 OFFICE O/P EST LOW 20 MIN: CPT | Performed by: NURSE PRACTITIONER

## 2019-07-31 PROCEDURE — 81002 URINALYSIS NONAUTO W/O SCOPE: CPT | Performed by: NURSE PRACTITIONER

## 2019-07-31 PROCEDURE — 4040F PNEUMOC VAC/ADMIN/RCVD: CPT | Performed by: NURSE PRACTITIONER

## 2019-07-31 PROCEDURE — G8427 DOCREV CUR MEDS BY ELIG CLIN: HCPCS | Performed by: NURSE PRACTITIONER

## 2019-07-31 RX ORDER — CEPHALEXIN 500 MG/1
500 CAPSULE ORAL 2 TIMES DAILY
Qty: 14 CAPSULE | Refills: 0 | Status: SHIPPED | OUTPATIENT
Start: 2019-07-31 | End: 2019-08-07

## 2019-07-31 ASSESSMENT — ENCOUNTER SYMPTOMS
VOMITING: 0
COUGH: 0
CHEST TIGHTNESS: 0
NAUSEA: 0
SHORTNESS OF BREATH: 0
RESPIRATORY NEGATIVE: 1
WHEEZING: 0

## 2019-07-31 NOTE — PROGRESS NOTES
[facility]  [address] [place] [address]  Dept: [phone]  Dept Fax: [phone]     Huma Cabrera is a 81 y.o. female who presents to the urgent care today for her medicalconditions/complaints as noted below. Huma Cabrera is c/o of Urinary Tract Infection (no discomfort, bubbly and cloudy urine, frequency, feels like she goes alot. no other symtoms )    HPI:      Urinary Tract Infection    This is a new problem. The current episode started today. The problem has been gradually worsening. The patient is experiencing no pain. There has been no fever. Associated symptoms include frequency. Pertinent negatives include no chills, flank pain, hematuria, hesitancy, nausea, urgency or vomiting. She has tried nothing for the symptoms. The treatment provided no relief. Her past medical history is significant for recurrent UTIs.      Past Medical History:   Diagnosis Date    Anemia     Gastroesophageal reflux disease without esophagitis 12/20/2017    Hyperlipidemia     Hypertension     Hypothyroidism     TIA (transient ischemic attack)       Current Outpatient Medications   Medication Sig Dispense Refill    cephALEXin (KEFLEX) 500 MG capsule Take 1 capsule by mouth 2 times daily for 7 days 14 capsule 0    fosinopril (MONOPRIL) 20 MG tablet TAKE 1 TABLET BY MOUTH EVERY DAY 90 tablet 0    pantoprazole (PROTONIX) 40 MG tablet TAKE 1 TABLET BY MOUTH EVERY DAY 90 tablet 0    levothyroxine (SYNTHROID) 125 MCG tablet TAKE 1 TABLET BY MOUTH EVERY DAY 90 tablet 0    benztropine (COGENTIN) 0.5 MG tablet TAKE 1 TABLET BY MOUTH TWICE DAILY 180 tablet 0    Biotin 5000 MCG CAPS Take by mouth      Pyridoxine HCl (VITAMIN B-6) 100 MG tablet Take 100 mg by mouth daily      Cranberry-Vitamin C-Vitamin E (CRANBERRY PLUS VITAMIN C PO) Take by mouth      Cholecalciferol (VITAMIN D3) 2000 UNITS CAPS Take by mouth daily      vitamin E 400 UNIT capsule

## 2019-07-31 NOTE — PATIENT INSTRUCTIONS
from front to back. When should you call for help? Call your doctor now or seek immediate medical care if:    · Symptoms such as fever, chills, nausea, or vomiting get worse or appear for the first time.     · You have new pain in your back just below your rib cage. This is called flank pain.     · There is new blood or pus in your urine.     · You have any problems with your antibiotic medicine.    Watch closely for changes in your health, and be sure to contact your doctor if:    · You are not getting better after taking an antibiotic for 2 days.     · Your symptoms go away but then come back. Where can you learn more? Go to https://[employer]. org and sign in to your [employer] account. Enter F417 in the [employer] box to learn more about \"Urinary Tract Infection in Women: Care Instructions. \"     If you do not have an account, please click on the \"Sign Up Now\" link. Current as of: December 19, 2018  Content Version: 12.0  © 6375-8706 Healthwise, Incorporated. Care instructions adapted under license by [facility] [place] ([facility]). If you have questions about a medical condition or this instruction, always ask your healthcare professional. [place] [address] any warranty or liability for your use of this information.

## 2019-08-03 LAB
CULTURE: ABNORMAL
Lab: ABNORMAL
SPECIMEN DESCRIPTION: ABNORMAL

## 2019-08-05 ENCOUNTER — INITIAL CONSULT (OUTPATIENT)
Dept: ENDOCRINOLOGY | Age: 84
End: 2019-08-05
Payer: MEDICARE

## 2019-08-05 VITALS
SYSTOLIC BLOOD PRESSURE: 108 MMHG | TEMPERATURE: 98.5 F | DIASTOLIC BLOOD PRESSURE: 64 MMHG | HEIGHT: 66 IN | WEIGHT: 152.2 LBS | HEART RATE: 72 BPM | BODY MASS INDEX: 24.46 KG/M2

## 2019-08-05 DIAGNOSIS — E06.3 HYPOTHYROIDISM DUE TO HASHIMOTO'S THYROIDITIS: ICD-10-CM

## 2019-08-05 DIAGNOSIS — E83.52 HYPERCALCEMIA: Primary | ICD-10-CM

## 2019-08-05 DIAGNOSIS — E04.9 GOITER: ICD-10-CM

## 2019-08-05 DIAGNOSIS — E03.8 HYPOTHYROIDISM DUE TO HASHIMOTO'S THYROIDITIS: ICD-10-CM

## 2019-08-05 DIAGNOSIS — Z78.0 POST-MENOPAUSAL: ICD-10-CM

## 2019-08-05 DIAGNOSIS — E11.9 TYPE 2 DIABETES MELLITUS WITHOUT COMPLICATION, WITHOUT LONG-TERM CURRENT USE OF INSULIN (HCC): ICD-10-CM

## 2019-08-05 PROCEDURE — 1090F PRES/ABSN URINE INCON ASSESS: CPT | Performed by: INTERNAL MEDICINE

## 2019-08-05 PROCEDURE — 99204 OFFICE O/P NEW MOD 45 MIN: CPT | Performed by: INTERNAL MEDICINE

## 2019-08-05 PROCEDURE — G8427 DOCREV CUR MEDS BY ELIG CLIN: HCPCS | Performed by: INTERNAL MEDICINE

## 2019-08-05 PROCEDURE — G8420 CALC BMI NORM PARAMETERS: HCPCS | Performed by: INTERNAL MEDICINE

## 2019-08-05 NOTE — PROGRESS NOTES
& Units 1yr ago   Sodium mmol/L 141    Chloride mmol/L 109    Potassium mmol/L 4.2    BUN mg/dL 24    CREATININE  0.77    Glucose mg/dL 106    AST U/L 30    ALT U/L 37    Calcium mg/dL 10.2    Total Protein  6.8    CO2 mmol/L 24    Alb  3.9    Alkaline Phosphatase U/L 75            Contains abnormal data Basic Metabolic Panel   Order: [id number]   Collected:  3/14/2017 05:53      Ref Range & Units 2yr ago   Glucose 70 - 99 mg/dL 145High     BUN 8 - 23 mg/dL 25High     CREATININE 0.50 - 0.90 mg/dL 0.70    Bun/Cre Ratio 9 - 20 36High     Calcium 8.6 - 10.4 mg/dL 10.5High     Sodium 135 - 144 mmol/L 137    Potassium 3.7 - 5.3 mmol/L 5.0    Chloride 98 - 107 mmol/L 103    CO2 20 - 31 mmol/L 20    Anion Gap 9 - 17 mmol/L 14    GFR Non-African American >60 mL/min >60               CT OF THE ABDOMEN AND PELVIS WITHOUT CONTRAST 3/14/2017 6:18 amABDOMINAL PAIN       There are multiple fluid-filled loops of small bowel, which are borderline in   size measuring at air just above 3 cm in size.  This could be related to an   ileus.  No obstruction is identified.       Cholelithiasis with mild gallbladder is distension, though no definite wall   thickening or pericholecystic inflammatory changes.       Mild adrenal thickening, left greater than right which may be related to   adrenal hyperplasia.       No urinary tract calculi or obstruction. Impression:     MILD HYPERCALCEMIA WITH ELEVATED PTH    HASHIMOTO'S THYROIDITIS WITH HYPOTHYROIDISM    RT LOWER POLE THYROID NODULE  GOITER. R/O MULTINODULAR GOITER    HX OF  DIABETES ON DIET    S/P RT CARPAL TUNNEL SURGERY  7/3/19    POST MENOPAUSAL    HX OF UTI    DEG ARTHRITIS  RT FIRST META CARPO PHALANGEAL JOINT    1. Hypercalcemia    2. Type 2 diabetes mellitus without complication, without long-term current use of insulin ([address]    3. Hypothyroidism due to Hashimoto's thyroiditis    4. Goiter    5. Post-menopausal          Plan:      1. ALL LAB DISCUSSED.   2. DISCUSSED ABOUT  HYPERCALCEMIA AND POSSIBLE CAUSES  3. STOP CALCIUM SUPPLEMENTS  4. STOP BIOTIN  5. GET LAB AS ORDERED. GET 24 HOUR URINE  CALCIUM LEVEL  6. GET  THYROID ULTRASOUND  7. CHECK A1C AND URINE MICRO ALB ALSO  8. CONTINUE LEVOTHYROXINE 125 MCG DAILY  9. RETURN 6 WEEKS. 10.     Orders Placed This Encounter   Procedures    US Thyroid     Standing Status:   Future     Standing Expiration Date:   8/4/2020     Order Specific Question:   Reason for exam:     Answer:   nodule    DEXA BONE DENSITY 2 SITES     Standing Status:   Future     Standing Expiration Date:   8/5/2020    Hemoglobin A1C     Standing Status:   Future     Standing Expiration Date:   8/4/2020    Calcium, Ionized     Standing Status:   Future     Standing Expiration Date:   8/4/2020    PTH, Intact With Ionized Calcium     Standing Status:   Future     Standing Expiration Date:   8/5/2020    Phosphorus     Standing Status:   Future     Standing Expiration Date:   8/4/2020    Calcium, 24 Hr Urine (w/ Creatinine)     Standing Status:   Future     Standing Expiration Date:   8/4/2020    Magnesium     Standing Status:   Future     Standing Expiration Date:   8/5/2020    Microalbumin, Ur     Standing Status:   Future     Standing Expiration Date:   8/4/2020     No orders of the defined types were placed in this encounter. Return in about 6 weeks (around 9/16/2019) for HYPERCALCEMIA, HYPOTHYRODISM,  GOITER. Visit date not found    Patient given educationalmaterials - see patient instructions. Discussed use, benefit, and side effectsof prescribed medications. All patient questions answered. Pt voiced understanding. Reviewed health maintenance. Instructed to continue current medications, diet andexercise. Patient agreed with treatment plan. Follow up as directed.      Electronicallysigned by Sammi Quinn MD on 8/4/19 at 9:52 PM

## 2019-08-14 ENCOUNTER — APPOINTMENT (OUTPATIENT)
Dept: GENERAL RADIOLOGY | Age: 84
End: 2019-08-14
Payer: MEDICARE

## 2019-08-14 ENCOUNTER — HOSPITAL ENCOUNTER (EMERGENCY)
Age: 84
Discharge: HOME | End: 2019-08-14
Attending: EMERGENCY MEDICINE
Payer: MEDICARE

## 2019-08-14 VITALS
TEMPERATURE: 98.1 F | SYSTOLIC BLOOD PRESSURE: 138 MMHG | HEART RATE: 76 BPM | RESPIRATION RATE: 19 BRPM | BODY MASS INDEX: 24.17 KG/M2 | WEIGHT: 150.4 LBS | DIASTOLIC BLOOD PRESSURE: 49 MMHG | HEIGHT: 66 IN | OXYGEN SATURATION: 98 %

## 2019-08-14 DIAGNOSIS — R53.83 OTHER FATIGUE: Primary | ICD-10-CM

## 2019-08-14 LAB
ABSOLUTE EOS #: 0.1 K/UL (ref 0–0.44)
ABSOLUTE IMMATURE GRANULOCYTE: 0.02 K/UL (ref 0–0.3)
ABSOLUTE LYMPH #: 2.31 K/UL (ref 1.1–3.7)
ABSOLUTE MONO #: 1.26 K/UL (ref 0.1–1.2)
ANION GAP SERPL CALCULATED.3IONS-SCNC: 15 MMOL/L (ref 9–17)
BASOPHILS # BLD: 1 % (ref 0–2)
BASOPHILS ABSOLUTE: 0.04 K/UL (ref 0–0.2)
BILIRUBIN URINE: NEGATIVE
BUN BLDV-MCNC: 24 MG/DL (ref 8–23)
BUN/CREAT BLD: 33 (ref 9–20)
CALCIUM SERPL-MCNC: 10.7 MG/DL (ref 8.6–10.4)
CHLORIDE BLD-SCNC: 100 MMOL/L (ref 98–107)
CO2: 21 MMOL/L (ref 20–31)
COLOR: YELLOW
COMMENT UA: NORMAL
CREAT SERPL-MCNC: 0.73 MG/DL (ref 0.5–0.9)
DIFFERENTIAL TYPE: ABNORMAL
EOSINOPHILS RELATIVE PERCENT: 1 % (ref 1–4)
GFR AFRICAN AMERICAN: >60 ML/MIN
GFR NON-AFRICAN AMERICAN: >60 ML/MIN
GFR SERPL CREATININE-BSD FRML MDRD: ABNORMAL ML/MIN/{1.73_M2}
GFR SERPL CREATININE-BSD FRML MDRD: ABNORMAL ML/MIN/{1.73_M2}
GLUCOSE BLD-MCNC: 136 MG/DL (ref 70–99)
GLUCOSE URINE: NEGATIVE
HCT VFR BLD CALC: 38.7 % (ref 36.3–47.1)
HEMOGLOBIN: 13 G/DL (ref 11.9–15.1)
IMMATURE GRANULOCYTES: 0 %
KETONES, URINE: NEGATIVE
LEUKOCYTE ESTERASE, URINE: NEGATIVE
LYMPHOCYTES # BLD: 30 % (ref 24–43)
MCH RBC QN AUTO: 31.6 PG (ref 25.2–33.5)
MCHC RBC AUTO-ENTMCNC: 33.6 G/DL (ref 28.4–34.8)
MCV RBC AUTO: 93.9 FL (ref 82.6–102.9)
MONOCYTES # BLD: 16 % (ref 3–12)
MYOGLOBIN: 102 NG/ML (ref 25–58)
NITRITE, URINE: NEGATIVE
NRBC AUTOMATED: 0 PER 100 WBC
PDW BLD-RTO: 11.9 % (ref 11.8–14.4)
PH UA: 6 (ref 5–8)
PLATELET # BLD: 253 K/UL (ref 138–453)
PLATELET ESTIMATE: ABNORMAL
PMV BLD AUTO: 9.5 FL (ref 8.1–13.5)
POTASSIUM SERPL-SCNC: 4.6 MMOL/L (ref 3.7–5.3)
PROTEIN UA: NEGATIVE
RBC # BLD: 4.12 M/UL (ref 3.95–5.11)
RBC # BLD: ABNORMAL 10*6/UL
SEG NEUTROPHILS: 52 % (ref 36–65)
SEGMENTED NEUTROPHILS ABSOLUTE COUNT: 4.06 K/UL (ref 1.5–8.1)
SODIUM BLD-SCNC: 136 MMOL/L (ref 135–144)
SPECIFIC GRAVITY UA: 1.01 (ref 1–1.03)
TROPONIN INTERP: ABNORMAL
TROPONIN T: ABNORMAL NG/ML
TROPONIN, HIGH SENSITIVITY: 28 NG/L (ref 0–14)
TURBIDITY: CLEAR
URINE HGB: NEGATIVE
UROBILINOGEN, URINE: NORMAL
WBC # BLD: 7.8 K/UL (ref 3.5–11.3)
WBC # BLD: ABNORMAL 10*3/UL

## 2019-08-14 PROCEDURE — 83874 ASSAY OF MYOGLOBIN: CPT

## 2019-08-14 PROCEDURE — 93005 ELECTROCARDIOGRAM TRACING: CPT | Performed by: NURSE PRACTITIONER

## 2019-08-14 PROCEDURE — 2580000003 HC RX 258: Performed by: NURSE PRACTITIONER

## 2019-08-14 PROCEDURE — 80048 BASIC METABOLIC PNL TOTAL CA: CPT

## 2019-08-14 PROCEDURE — 85025 COMPLETE CBC W/AUTO DIFF WBC: CPT

## 2019-08-14 PROCEDURE — 84484 ASSAY OF TROPONIN QUANT: CPT

## 2019-08-14 PROCEDURE — 81003 URINALYSIS AUTO W/O SCOPE: CPT

## 2019-08-14 PROCEDURE — 71045 X-RAY EXAM CHEST 1 VIEW: CPT

## 2019-08-14 PROCEDURE — 99284 EMERGENCY DEPT VISIT MOD MDM: CPT

## 2019-08-14 RX ORDER — SODIUM CHLORIDE 9 MG/ML
INJECTION, SOLUTION INTRAVENOUS CONTINUOUS
Status: DISCONTINUED | OUTPATIENT
Start: 2019-08-14 | End: 2019-08-14 | Stop reason: HOSPADM

## 2019-08-14 RX ADMIN — SODIUM CHLORIDE 1000 ML: 9 INJECTION, SOLUTION INTRAVENOUS at 18:37

## 2019-08-15 LAB
EKG ATRIAL RATE: 83 BPM
EKG P AXIS: 81 DEGREES
EKG P-R INTERVAL: 162 MS
EKG Q-T INTERVAL: 352 MS
EKG QRS DURATION: 92 MS
EKG QTC CALCULATION (BAZETT): 413 MS
EKG R AXIS: 49 DEGREES
EKG T AXIS: 53 DEGREES
EKG VENTRICULAR RATE: 83 BPM

## 2019-08-15 ASSESSMENT — ENCOUNTER SYMPTOMS
COLOR CHANGE: 0
RHINORRHEA: 0
ABDOMINAL PAIN: 0
SORE THROAT: 0
WHEEZING: 0
SINUS PRESSURE: 0
VOMITING: 0
CONSTIPATION: 0
SHORTNESS OF BREATH: 0
DIARRHEA: 0
COUGH: 0
NAUSEA: 0

## 2019-08-15 NOTE — ED PROVIDER NOTES
[id number]  [phone]    Schedule an appointment as soon as possible for a visit         DISCHARGE MEDICATIONS:     Discharge Medication List as of 8/14/2019  7:55 PM              (Please note that portions of this note were completed with a voice recognition program.  Efforts were made to edit the dictations but occasionally words are mis-transcribed.)    [address] [facility] MEMO, MUMTAZ - CNP  Certified Nurse Practitioner            MUMTAZ Lara CNP  08/15/19 0020

## 2019-09-06 ENCOUNTER — HOSPITAL ENCOUNTER (OUTPATIENT)
Dept: MAMMOGRAPHY | Age: 84
Discharge: HOME | End: 2019-09-08
Payer: MEDICARE

## 2019-09-06 ENCOUNTER — HOSPITAL ENCOUNTER (OUTPATIENT)
Dept: ULTRASOUND IMAGING | Age: 84
Discharge: HOME | End: 2019-09-08
Payer: MEDICARE

## 2019-09-06 DIAGNOSIS — E04.9 GOITER: ICD-10-CM

## 2019-09-06 DIAGNOSIS — Z78.0 POST-MENOPAUSAL: ICD-10-CM

## 2019-09-06 PROCEDURE — 77080 DXA BONE DENSITY AXIAL: CPT

## 2019-09-06 PROCEDURE — 76536 US EXAM OF HEAD AND NECK: CPT

## 2019-09-23 ENCOUNTER — HOSPITAL ENCOUNTER (OUTPATIENT)
Dept: ULTRASOUND IMAGING | Age: 84
Discharge: HOME | End: 2019-09-25
Payer: MEDICARE

## 2019-09-23 DIAGNOSIS — N39.0 URINARY TRACT INFECTION WITHOUT HEMATURIA, SITE UNSPECIFIED: ICD-10-CM

## 2019-09-23 PROCEDURE — 76775 US EXAM ABDO BACK WALL LIM: CPT

## 2019-09-26 DIAGNOSIS — K21.9 GASTROESOPHAGEAL REFLUX DISEASE WITHOUT ESOPHAGITIS: ICD-10-CM

## 2019-09-26 RX ORDER — PANTOPRAZOLE SODIUM 40 MG/1
TABLET, DELAYED RELEASE ORAL
Qty: 90 TABLET | Refills: 0 | Status: SHIPPED | OUTPATIENT
Start: 2019-09-26 | End: 2020-01-02

## 2019-09-26 RX ORDER — LEVOTHYROXINE SODIUM 0.12 MG/1
TABLET ORAL
Qty: 90 TABLET | Refills: 0 | Status: SHIPPED | OUTPATIENT
Start: 2019-09-26 | End: 2020-01-02

## 2019-09-26 RX ORDER — BENZTROPINE MESYLATE 0.5 MG/1
TABLET ORAL
Qty: 180 TABLET | Refills: 0 | Status: SHIPPED | OUTPATIENT
Start: 2019-09-26 | End: 2020-01-02

## 2019-10-15 ENCOUNTER — OFFICE VISIT (OUTPATIENT)
Dept: ENDOCRINOLOGY | Age: 84
End: 2019-10-15
Payer: MEDICARE

## 2019-10-15 VITALS
HEIGHT: 66 IN | DIASTOLIC BLOOD PRESSURE: 60 MMHG | BODY MASS INDEX: 24.11 KG/M2 | HEART RATE: 62 BPM | SYSTOLIC BLOOD PRESSURE: 100 MMHG | WEIGHT: 150 LBS

## 2019-10-15 DIAGNOSIS — E03.8 HYPOTHYROIDISM DUE TO HASHIMOTO'S THYROIDITIS: ICD-10-CM

## 2019-10-15 DIAGNOSIS — Z78.0 POST-MENOPAUSAL: ICD-10-CM

## 2019-10-15 DIAGNOSIS — E83.52 HYPERCALCEMIA: Primary | ICD-10-CM

## 2019-10-15 DIAGNOSIS — E11.9 TYPE 2 DIABETES MELLITUS WITHOUT COMPLICATION, WITHOUT LONG-TERM CURRENT USE OF INSULIN (HCC): ICD-10-CM

## 2019-10-15 DIAGNOSIS — E06.3 HYPOTHYROIDISM DUE TO HASHIMOTO'S THYROIDITIS: ICD-10-CM

## 2019-10-15 DIAGNOSIS — E04.9 GOITER: ICD-10-CM

## 2019-10-15 LAB — HBA1C MFR BLD: 5.6 %

## 2019-10-15 PROCEDURE — 99214 OFFICE O/P EST MOD 30 MIN: CPT | Performed by: INTERNAL MEDICINE

## 2019-10-15 PROCEDURE — 1123F ACP DISCUSS/DSCN MKR DOCD: CPT | Performed by: INTERNAL MEDICINE

## 2019-10-15 PROCEDURE — 4040F PNEUMOC VAC/ADMIN/RCVD: CPT | Performed by: INTERNAL MEDICINE

## 2019-10-15 PROCEDURE — G8484 FLU IMMUNIZE NO ADMIN: HCPCS | Performed by: INTERNAL MEDICINE

## 2019-10-15 PROCEDURE — 1036F TOBACCO NON-USER: CPT | Performed by: INTERNAL MEDICINE

## 2019-10-15 PROCEDURE — G8420 CALC BMI NORM PARAMETERS: HCPCS | Performed by: INTERNAL MEDICINE

## 2019-10-15 PROCEDURE — G8427 DOCREV CUR MEDS BY ELIG CLIN: HCPCS | Performed by: INTERNAL MEDICINE

## 2019-10-15 PROCEDURE — G8399 PT W/DXA RESULTS DOCUMENT: HCPCS | Performed by: INTERNAL MEDICINE

## 2019-10-15 PROCEDURE — 1090F PRES/ABSN URINE INCON ASSESS: CPT | Performed by: INTERNAL MEDICINE

## 2019-10-15 PROCEDURE — 83036 HEMOGLOBIN GLYCOSYLATED A1C: CPT | Performed by: INTERNAL MEDICINE

## 2019-10-15 RX ORDER — IBANDRONATE SODIUM 150 MG/1
150 TABLET, FILM COATED ORAL
Qty: 3 TABLET | Refills: 1 | Status: SHIPPED | OUTPATIENT
Start: 2019-10-15 | End: 2020-03-23

## 2019-11-10 RX ORDER — FOSINOPRIL SODIUM 20 MG/1
TABLET ORAL
Qty: 90 TABLET | Refills: 0 | Status: SHIPPED | OUTPATIENT
Start: 2019-11-10

## 2019-11-15 ENCOUNTER — HOSPITAL ENCOUNTER (OUTPATIENT)
Dept: NUCLEAR MEDICINE | Age: 84
Discharge: HOME | End: 2019-11-17
Payer: MEDICARE

## 2019-11-15 DIAGNOSIS — E83.52 HYPERCALCEMIA: ICD-10-CM

## 2019-11-15 PROCEDURE — 78071 PARATHYRD PLANAR W/WO SUBTRJ: CPT

## 2019-11-15 PROCEDURE — 3430000000 HC RX DIAGNOSTIC RADIOPHARMACEUTICAL: Performed by: INTERNAL MEDICINE

## 2019-11-15 PROCEDURE — A9500 TC99M SESTAMIBI: HCPCS | Performed by: INTERNAL MEDICINE

## 2019-11-15 RX ADMIN — TETRAKIS(2-METHOXYISOBUTYLISOCYANIDE)COPPER(I) TETRAFLUOROBORATE 20.2 MILLICURIE: 1 INJECTION, POWDER, LYOPHILIZED, FOR SOLUTION INTRAVENOUS at 08:12

## 2019-11-26 ENCOUNTER — HOSPITAL ENCOUNTER (OUTPATIENT)
Dept: MAMMOGRAPHY | Age: 84
Discharge: HOME | End: 2019-11-28
Payer: MEDICARE

## 2019-11-26 DIAGNOSIS — Z12.31 VISIT FOR SCREENING MAMMOGRAM: ICD-10-CM

## 2019-11-26 PROCEDURE — 77063 BREAST TOMOSYNTHESIS BI: CPT

## 2020-01-02 RX ORDER — BENZTROPINE MESYLATE 0.5 MG/1
TABLET ORAL
Qty: 180 TABLET | Refills: 0 | Status: SHIPPED | OUTPATIENT
Start: 2020-01-02 | End: 2020-04-02

## 2020-01-02 RX ORDER — PANTOPRAZOLE SODIUM 40 MG/1
TABLET, DELAYED RELEASE ORAL
Qty: 90 TABLET | Refills: 0 | Status: SHIPPED | OUTPATIENT
Start: 2020-01-02 | End: 2020-04-04

## 2020-01-02 RX ORDER — LEVOTHYROXINE SODIUM 0.12 MG/1
TABLET ORAL
Qty: 90 TABLET | Refills: 0 | Status: SHIPPED | OUTPATIENT
Start: 2020-01-02

## 2020-03-23 RX ORDER — IBANDRONATE SODIUM 150 MG/1
TABLET, FILM COATED ORAL
Qty: 3 TABLET | Refills: 1 | Status: SHIPPED | OUTPATIENT
Start: 2020-03-23

## 2020-04-02 RX ORDER — BENZTROPINE MESYLATE 0.5 MG/1
TABLET ORAL
Qty: 180 TABLET | Refills: 0 | Status: SHIPPED | OUTPATIENT
Start: 2020-04-02

## 2020-04-04 RX ORDER — PANTOPRAZOLE SODIUM 40 MG/1
TABLET, DELAYED RELEASE ORAL
Qty: 90 TABLET | Refills: 0 | Status: SHIPPED | OUTPATIENT
Start: 2020-04-04 | End: 2020-07-16

## 2020-07-16 RX ORDER — PANTOPRAZOLE SODIUM 40 MG/1
TABLET, DELAYED RELEASE ORAL
Qty: 90 TABLET | Refills: 0 | Status: SHIPPED | OUTPATIENT
Start: 2020-07-16 | End: 2020-09-29

## 2020-10-30 RX ORDER — PANTOPRAZOLE SODIUM 40 MG/1
TABLET, DELAYED RELEASE ORAL
Qty: 90 TABLET | Refills: 0 | OUTPATIENT
Start: 2020-10-30

## 2021-02-06 ENCOUNTER — HOSPITAL ENCOUNTER (EMERGENCY)
Age: 86
Discharge: HOME | End: 2021-02-06
Attending: EMERGENCY MEDICINE
Payer: MEDICARE

## 2021-02-06 VITALS
WEIGHT: 128 LBS | BODY MASS INDEX: 20.57 KG/M2 | OXYGEN SATURATION: 99 % | SYSTOLIC BLOOD PRESSURE: 155 MMHG | TEMPERATURE: 97.9 F | HEIGHT: 66 IN | HEART RATE: 77 BPM | DIASTOLIC BLOOD PRESSURE: 63 MMHG | RESPIRATION RATE: 16 BRPM

## 2021-02-06 DIAGNOSIS — R82.90 CLOUDY URINE: Primary | ICD-10-CM

## 2021-02-06 LAB
BILIRUBIN URINE: NEGATIVE
COLOR: YELLOW
COMMENT UA: NORMAL
GLUCOSE URINE: NEGATIVE
KETONES, URINE: NEGATIVE
LEUKOCYTE ESTERASE, URINE: NEGATIVE
NITRITE, URINE: NEGATIVE
PH UA: 5.5 (ref 5–8)
PROTEIN UA: NEGATIVE
SPECIFIC GRAVITY UA: 1.01 (ref 1–1.03)
TURBIDITY: CLEAR
URINE HGB: NEGATIVE
UROBILINOGEN, URINE: NORMAL

## 2021-02-06 PROCEDURE — 87086 URINE CULTURE/COLONY COUNT: CPT

## 2021-02-06 PROCEDURE — 81003 URINALYSIS AUTO W/O SCOPE: CPT

## 2021-02-06 PROCEDURE — 99283 EMERGENCY DEPT VISIT LOW MDM: CPT

## 2021-02-06 ASSESSMENT — ENCOUNTER SYMPTOMS
TROUBLE SWALLOWING: 0
ABDOMINAL PAIN: 0
SHORTNESS OF BREATH: 0

## 2021-02-06 NOTE — ED PROVIDER NOTES
EMERGENCY DEPARTMENT ENCOUNTER    Pt Name: Linette Brennan  MRN: [id number]  [place] 1935  Date of evaluation: 2/6/21  CHIEF COMPLAINT       Chief Complaint   Patient presents with    Urinary Tract Infection     onset 3 days     HISTORY OF PRESENT ILLNESS   Patient is an 60-year-old female here with cloudy urine. She states she is concerned she has a UTI. She states it has been mainly cloudy at night for the past 3 days. She denies any other symptoms. Denies dysuria hematuria vaginal discharge or itching any abdominal pain back pain fevers chills nausea vomiting. REVIEW OF SYSTEMS     Review of Systems   Constitutional: Negative for chills and fever. HENT: Negative for trouble swallowing. Eyes: Negative for visual disturbance. Respiratory: Negative for shortness of breath. Cardiovascular: Negative for chest pain. Gastrointestinal: Negative for abdominal pain. Genitourinary: Negative for difficulty urinating.        + cloudy urine   Musculoskeletal: Negative for neck pain. Skin: Negative for rash. Neurological: Negative for weakness. Psychiatric/Behavioral: Negative for confusion.      PASTMEDICAL HISTORY     Past Medical History:   Diagnosis Date    Anemia     Gastroesophageal reflux disease without esophagitis 12/20/2017    Hard of hearing     Hyperlipidemia     Hypertension     Hypothyroidism     TIA (transient ischemic attack)      SURGICAL HISTORY       Past Surgical History:   Procedure Laterality Date    CARPAL TUNNEL RELEASE Right     TONSILLECTOMY      age 16     CURRENT MEDICATIONS       Discharge Medication List as of 2/6/2021  5:06 PM      CONTINUE these medications which have NOT CHANGED    Details   famotidine (PEPCID) 40 MG tablet Historical Med      benztropine (COGENTIN) 0.5 MG tablet TAKE 1 TABLET BY MOUTH TWICE DAILY, Disp-180 tablet,R-0Normal      ibandronate (BONIVA) 150 MG tablet SEE NOTES, Disp-3 tablet,R-11 TABLET BY MOUTH EVERY 30 DAYS IN THE AM WITH A FULL GLASS OF WATER, ON AN EMPTY STOMACH. DO NOT TAKE ANYTHING BY MOUTH OR LIE DOWN FOR 30MIN. Elise Garcia Normal      levothyroxine (SYNTHROID) 125 MCG tablet TAKE 1 TABLET BY MOUTH EVERY DAY, Disp-90 tablet, R-0Normal      fosinopril (MONOPRIL) 20 MG tablet TAKE 1 TABLET BY MOUTH EVERY DAY, Disp-90 tablet, R-0Normal      Biotin 5000 MCG CAPS Take by mouthHistorical Med      Pyridoxine HCl (VITAMIN B-6) 100 MG tablet Take 100 mg by mouth daily      Cranberry-Vitamin C-Vitamin E (CRANBERRY PLUS VITAMIN C PO) Take by mouth      Cholecalciferol (VITAMIN D3) 2000 UNITS CAPS Take by mouth daily      vitamin E 400 UNIT capsule Take 400 Units by mouth daily      Cyanocobalamin (B-12) 1000 MCG SUBL Place under the tongue      Nutritional Supplements (OSTEO ADVANCE PO) Take by mouth      folic acid (FOLVITE) 240 MCG tablet Take 800 mcg by mouth daily      Omega-3 Fatty Acids (FISH OIL) 1200 MG CAPS Take by mouth      Multiple Vitamins-Minerals (CENTRUM SILVER PO) Take by mouth           ALLERGIES     is allergic to gluten meal and penicillins. FAMILY HISTORY     She indicated that her mother is . She indicated that her father is . SOCIAL HISTORY       Social History     Tobacco Use    Smoking status: Former Smoker     Quit date: 1970     Years since quittin.5    Smokeless tobacco: Never Used   Substance Use Topics    Alcohol use: No     Alcohol/week: 0.0 standard drinks    Drug use: No     PHYSICAL EXAM     INITIAL VITALS: BP (!) 155/63   Pulse 77   Temp 97.9 °F (36.6 °C) (Oral)   Resp 16   Ht 5' 6\" (1.676 m)   Wt 128 lb (58.1 kg)   SpO2 99%   BMI 20.66 kg/m²    Physical Exam  Vitals signs and nursing note reviewed. Constitutional:       General: She is not in acute distress. Appearance: She is not ill-appearing, toxic-appearing or diaphoretic. HENT:      Head: Normocephalic and atraumatic. Mouth/Throat:      Mouth: Mucous membranes are moist.      Pharynx: Oropharynx is clear. Eyes:      Extraocular Movements: Extraocular movements intact. Pupils: Pupils are equal, round, and reactive to light. Neck:      Musculoskeletal: Normal range of motion and neck supple. Cardiovascular:      Rate and Rhythm: Normal rate and regular rhythm. Pulmonary:      Effort: Pulmonary effort is normal. No respiratory distress. Abdominal:      General: There is no distension. Palpations: Abdomen is soft. There is no mass. Tenderness: There is no abdominal tenderness. There is no guarding or rebound. Musculoskeletal: Normal range of motion. General: No deformity. Skin:     General: Skin is warm and dry. Capillary Refill: Capillary refill takes less than 2 seconds. Neurological:      General: No focal deficit present. Mental Status: She is alert and oriented to person, place, and time. Psychiatric:         Thought Content: Thought content normal.         MEDICAL DECISION MAKING:          Please see ED Course below for MDM/ED course. DDx: UTI, vaginitis, infection    All patient's question's and concerns were answered prior to disposition and patient and/or family expressed understanding and agreement of treatment plan. NIH STROKE SCALE:            PROCEDURES:    Procedures    DIAGNOSTIC RESULTS   EKG:All EKG's are interpreted by the Emergency Department Physician who either signs or Co-signs this chart in the absence of a cardiologist.    RADIOLOGY:All plain film, CT, MRI, and formal ultrasound images (except ED bedside ultrasound) are read by the radiologist, see reports below, unless otherwisenoted in MDM or here. No orders to display     LABS: All lab results were reviewed by myself, and all abnormals are listed below. Labs Reviewed   CULTURE, URINE   URINE RT REFLEX TO CULTURE       EMERGENCY DEPARTMENTCOURSE:     Patient is an 81-year-old female here with cloudy urine. No other symptoms. Afebrile well-appearing. No abdominal tenderness.   No back tenderness. Will check urinalysis and treat symptomatically. Urine shows no evidence of infection. Urine culture sent. Patient updated on results. She has no abdominal pain no fevers she has no dysuria no vaginal discharge or itching concerning for vaginitis she just states it is just cloudy and she was concerned. At this point will hold off on antibiotics and have her follow-up with her PCP. Strict return precautions given. She is clinically stable for discharge. Vitals:    Vitals:    02/06/21 1534 02/06/21 1537   BP:  (!) 155/63   Pulse: 77    Resp: 16    Temp: 97.9 °F (36.6 °C)    TempSrc: Oral    SpO2: 99%    Weight: 128 lb (58.1 kg)    Height: 5' 6\" (1.676 m)        The patient was given the following medications while in the emergency department:  No orders of the defined types were placed in this encounter. CONSULTS:  None    FINAL IMPRESSION      1. Cloudy urine          DISPOSITION/PLAN   DISPOSITION Decision To Discharge 02/06/2021 05:06:09 PM      PATIENT REFERRED TO:  Rohan Tobias  [address] [facility],[address]  [address]  [phone]    Schedule an appointment as soon as possible for a visit       [facility] ED  [address]  [phone]    If symptoms worsen    DISCHARGE MEDICATIONS:  Discharge Medication List as of 2/6/2021  5:06 PM        Joaquin Kenney MD  Attending Emergency Physician    This note was created with the assistance of a speech-recognition program. While intending to generate a document that actually reflects the content of the visit, no guarantees can be provided that every mistake has been identified and corrected by editing.                     Joaquin Kenney MD  02/06/21 6057

## 2021-02-07 LAB
CULTURE: NORMAL
Lab: NORMAL
SPECIMEN DESCRIPTION: NORMAL

## 2022-10-06 ENCOUNTER — HOSPITAL ENCOUNTER (EMERGENCY)
Age: 87
Discharge: HOME | End: 2022-10-06
Attending: EMERGENCY MEDICINE
Payer: MEDICARE

## 2022-10-06 ENCOUNTER — APPOINTMENT (OUTPATIENT)
Dept: GENERAL RADIOLOGY | Age: 87
End: 2022-10-06
Payer: MEDICARE

## 2022-10-06 ENCOUNTER — APPOINTMENT (OUTPATIENT)
Dept: CT IMAGING | Age: 87
End: 2022-10-06
Payer: MEDICARE

## 2022-10-06 VITALS
DIASTOLIC BLOOD PRESSURE: 81 MMHG | HEIGHT: 66 IN | OXYGEN SATURATION: 100 % | RESPIRATION RATE: 16 BRPM | TEMPERATURE: 97.6 F | WEIGHT: 128 LBS | SYSTOLIC BLOOD PRESSURE: 173 MMHG | BODY MASS INDEX: 20.57 KG/M2 | HEART RATE: 89 BPM

## 2022-10-06 DIAGNOSIS — G45.9 TIA (TRANSIENT ISCHEMIC ATTACK): Primary | ICD-10-CM

## 2022-10-06 PROBLEM — R47.1 DYSARTHRIA: Status: ACTIVE | Noted: 2022-10-06

## 2022-10-06 LAB
ABSOLUTE EOS #: 0.11 K/UL (ref 0–0.44)
ABSOLUTE IMMATURE GRANULOCYTE: <0.03 K/UL (ref 0–0.3)
ABSOLUTE LYMPH #: 1.58 K/UL (ref 1.1–3.7)
ABSOLUTE MONO #: 0.81 K/UL (ref 0.1–1.2)
ANION GAP SERPL CALCULATED.3IONS-SCNC: 12 MMOL/L (ref 9–17)
ANION GAP: 11 MMOL/L (ref 7–16)
BASOPHILS # BLD: 0 % (ref 0–2)
BASOPHILS ABSOLUTE: <0.03 K/UL (ref 0–0.2)
BILIRUBIN URINE: NEGATIVE
BUN BLDV-MCNC: 20 MG/DL (ref 8–23)
CALCIUM SERPL-MCNC: 8.7 MG/DL (ref 8.6–10.4)
CHLORIDE BLD-SCNC: 112 MMOL/L (ref 98–107)
CO2: 17 MMOL/L (ref 20–31)
COLOR: YELLOW
CREAT SERPL-MCNC: 0.88 MG/DL (ref 0.5–0.9)
EGFR, POC: 50 ML/MIN/1.73M2
EOSINOPHILS RELATIVE PERCENT: 2 % (ref 1–4)
EPITHELIAL CELLS UA: NORMAL /HPF (ref 0–5)
GFR NON-AFRICAN AMERICAN: 48 ML/MIN
GFR SERPL CREATININE-BSD FRML MDRD: 59 ML/MIN
GFR SERPL CREATININE-BSD FRML MDRD: >60 ML/MIN/1.73M2
GLUCOSE BLD-MCNC: 125 MG/DL (ref 70–99)
GLUCOSE BLD-MCNC: 132 MG/DL (ref 74–100)
GLUCOSE URINE: NEGATIVE
HCO3 VENOUS: 20.8 MMOL/L (ref 22–29)
HCT VFR BLD CALC: 35 % (ref 36.3–47.1)
HEMOGLOBIN: 11.6 G/DL (ref 11.9–15.1)
IMMATURE GRANULOCYTES: 0 %
INR BLD: 1
KETONES, URINE: NEGATIVE
LEUKOCYTE ESTERASE, URINE: ABNORMAL
LYMPHOCYTES # BLD: 29 % (ref 24–43)
MCH RBC QN AUTO: 31.5 PG (ref 25.2–33.5)
MCHC RBC AUTO-ENTMCNC: 33.1 G/DL (ref 28.4–34.8)
MCV RBC AUTO: 95.1 FL (ref 82.6–102.9)
MONOCYTES # BLD: 15 % (ref 3–12)
MYOGLOBIN: 56 NG/ML (ref 25–58)
NEGATIVE BASE EXCESS, VEN: 6 (ref 0–2)
NITRITE, URINE: NEGATIVE
NRBC AUTOMATED: 0 PER 100 WBC
O2 SAT, VEN: 20 % (ref 60–85)
PARTIAL THROMBOPLASTIN TIME: 24.6 SEC (ref 20.5–30.5)
PCO2, VEN: 47.2 MM HG (ref 41–51)
PDW BLD-RTO: 12.9 % (ref 11.8–14.4)
PH UA: 5.5 (ref 5–8)
PH VENOUS: 7.25 (ref 7.32–7.43)
PLATELET # BLD: 278 K/UL (ref 138–453)
PMV BLD AUTO: 10.1 FL (ref 8.1–13.5)
PO2, VEN: 17.8 MM HG (ref 30–50)
POC BUN: 21 MG/DL (ref 8–26)
POC CHLORIDE: 116 MMOL/L (ref 98–107)
POC CREATININE: 1.08 MG/DL (ref 0.51–1.19)
POC HEMATOCRIT: 36 % (ref 36–46)
POC HEMOGLOBIN: 12.2 G/DL (ref 12–16)
POC IONIZED CALCIUM: 1.27 MMOL/L (ref 1.15–1.33)
POC LACTIC ACID: 0.76 MMOL/L (ref 0.56–1.39)
POC POTASSIUM: 3.9 MMOL/L (ref 3.5–4.5)
POC SODIUM: 147 MMOL/L (ref 138–146)
POC TCO2: 21 MMOL/L (ref 22–30)
POTASSIUM SERPL-SCNC: 4 MMOL/L (ref 3.7–5.3)
PROTEIN UA: NEGATIVE
PROTHROMBIN TIME: 10.2 SEC (ref 9.1–12.3)
RBC # BLD: 3.68 M/UL (ref 3.95–5.11)
RBC UA: NORMAL /HPF (ref 0–4)
SEG NEUTROPHILS: 54 % (ref 36–65)
SEGMENTED NEUTROPHILS ABSOLUTE COUNT: 2.84 K/UL (ref 1.5–8.1)
SODIUM BLD-SCNC: 141 MMOL/L (ref 135–144)
SPECIFIC GRAVITY UA: 1.02 (ref 1–1.03)
TOTAL CK: 49 U/L (ref 26–192)
TROPONIN, HIGH SENSITIVITY: 18 NG/L (ref 0–14)
TSH SERPL DL<=0.05 MIU/L-ACNC: 4.21 UIU/ML (ref 0.3–5)
TURBIDITY: CLEAR
URINE HGB: NEGATIVE
UROBILINOGEN, URINE: NORMAL
WBC # BLD: 5.4 K/UL (ref 3.5–11.3)
WBC UA: NORMAL /HPF (ref 0–5)

## 2022-10-06 PROCEDURE — 82553 CREATINE MB FRACTION: CPT

## 2022-10-06 PROCEDURE — 81001 URINALYSIS AUTO W/SCOPE: CPT

## 2022-10-06 PROCEDURE — 80051 ELECTROLYTE PANEL: CPT

## 2022-10-06 PROCEDURE — 85730 THROMBOPLASTIN TIME PARTIAL: CPT

## 2022-10-06 PROCEDURE — 6370000000 HC RX 637 (ALT 250 FOR IP)

## 2022-10-06 PROCEDURE — 84484 ASSAY OF TROPONIN QUANT: CPT

## 2022-10-06 PROCEDURE — 82565 ASSAY OF CREATININE: CPT

## 2022-10-06 PROCEDURE — 84520 ASSAY OF UREA NITROGEN: CPT

## 2022-10-06 PROCEDURE — 71045 X-RAY EXAM CHEST 1 VIEW: CPT

## 2022-10-06 PROCEDURE — 85610 PROTHROMBIN TIME: CPT

## 2022-10-06 PROCEDURE — 82947 ASSAY GLUCOSE BLOOD QUANT: CPT

## 2022-10-06 PROCEDURE — 70496 CT ANGIOGRAPHY HEAD: CPT

## 2022-10-06 PROCEDURE — 99285 EMERGENCY DEPT VISIT HI MDM: CPT

## 2022-10-06 PROCEDURE — 85025 COMPLETE CBC W/AUTO DIFF WBC: CPT

## 2022-10-06 PROCEDURE — 82803 BLOOD GASES ANY COMBINATION: CPT

## 2022-10-06 PROCEDURE — 6360000004 HC RX CONTRAST MEDICATION

## 2022-10-06 PROCEDURE — 99203 OFFICE O/P NEW LOW 30 MIN: CPT | Performed by: PSYCHIATRY & NEUROLOGY

## 2022-10-06 PROCEDURE — 93005 ELECTROCARDIOGRAM TRACING: CPT

## 2022-10-06 PROCEDURE — 82550 ASSAY OF CK (CPK): CPT

## 2022-10-06 PROCEDURE — 83874 ASSAY OF MYOGLOBIN: CPT

## 2022-10-06 PROCEDURE — 82330 ASSAY OF CALCIUM: CPT

## 2022-10-06 PROCEDURE — 83605 ASSAY OF LACTIC ACID: CPT

## 2022-10-06 PROCEDURE — 80048 BASIC METABOLIC PNL TOTAL CA: CPT

## 2022-10-06 PROCEDURE — 84443 ASSAY THYROID STIM HORMONE: CPT

## 2022-10-06 PROCEDURE — 85014 HEMATOCRIT: CPT

## 2022-10-06 PROCEDURE — 70450 CT HEAD/BRAIN W/O DYE: CPT

## 2022-10-06 RX ORDER — ASPIRIN 81 MG/1
324 TABLET, CHEWABLE ORAL ONCE
Status: COMPLETED | OUTPATIENT
Start: 2022-10-06 | End: 2022-10-06

## 2022-10-06 RX ORDER — ASPIRIN 81 MG/1
81 TABLET, CHEWABLE ORAL DAILY
Qty: 14 TABLET | Refills: 0 | Status: SHIPPED | OUTPATIENT
Start: 2022-10-06 | End: 2022-10-20

## 2022-10-06 RX ADMIN — IOPAMIDOL 90 ML: 755 INJECTION, SOLUTION INTRAVENOUS at 11:50

## 2022-10-06 RX ADMIN — ASPIRIN 81 MG 324 MG: 81 TABLET ORAL at 13:20

## 2022-10-06 NOTE — ED NOTES
[facility] here, pt on stretcher, pt being transported back to [facility]  Patient continues to be confused, but is reoriented with words     Karina Rosen RN  10/06/22 9463

## 2022-10-06 NOTE — ED NOTES
Report received from Miguel  Patient sitting in chair outside room [facility]  10/06/22 7115

## 2022-10-06 NOTE — ED NOTES
Pt arrived to ED alert and oriented x4 via EMS. Pt to ED for sudden onset of slurred speech. Pt lives in a nursing facility and it was reported that at 1015 pt began to have slurred speech. EMS reported that 20 minutes later pt's speech was resolved. On arrival, pt speaking in full sentences and following commands. Pt denies complaints at this time. Pt denies having been around anyone suspected to have COVID-19 or anyone that has been sick, denies recent travel outside the [facility] or [address]. Pt placed on cardiac monitor, continuous pulse ox, and BP cuff. RR even and unlabored. NAD noted. Whiteboard updated. Will continue with plan of care.      Jazlyn Koch RN  10/06/22 2313

## 2022-10-06 NOTE — ED PROVIDER NOTES
[facility] ED     Emergency Department     Faculty Attestation        I performed a history and physical examination of the patient and discussed management with the resident. I reviewed the residents note and agree with the documented findings and plan of care. Any areas of disagreement are noted on the chart. I was personally present for the key portions of any procedures. I have documented in the chart those procedures where I was not present during the key portions. I have reviewed the emergency nurses triage note. I agree with the chief complaint, past medical history, past surgical history, allergies, medications, social and family history as documented unless otherwise noted below. For Physician Assistant/ Nurse Practitioner cases/documentation I have personally evaluated this patient and have completed at least one if not all key elements of the E/M (history, physical exam, and MDM). Additional findings are as noted. Vital Signs: BP: (!) 150/102  Heart Rate: 79  Resp: 18  Temp: 97.6 °F (36.4 °C) SpO2: 100 %  PCP:  Juan Raza    Pertinent Comments:     Patient is an 52-year-old female with history of hypertension as well as TIA in the past and severe hard of hearing. This morning had episode at nursing home that was witnessed that lasted approximate 20 minutes of difficulty with speech and possible expressive aphasia. This has since resolved and is here for evaluation. The patient denies any chest pain, shortness of breath, abdominal pain, back pain, headache, neck pain, or recent illnesses. Patient's lungs are clear to station bilateral with midline trachea heart regular rate and rhythm and abdomen soft/nontender. Patient has neurologic examination with 5/5 strength both upper and lower extremities proximally distally as well as face symmetric with no droop and pupils readily reactive and equal bilaterally.    Speech is normal at this time. Finger-nose intact as well. Assessment/Plan: Patient with likely TIA but will check brief infectious work-up as well with chest x-ray and UA. Stroke consult      EKG Interpretation    Interpreted by emergency department physician    Rhythm: normal sinus   Rate: normal at 79 bpm  Axis: normal  Conduction: normal  ST Segments: no acute change  T Waves: no acute change  Q Waves: no acute change    Clinical Impression:  nonspecific EKG. Critical Care  None      (Please note that portions of this note were completed with a voice recognition program. Efforts were made to edit the dictations but occasionally words are mis-transcribed.  Whenever words are used in this note in any gender, they shall be construed as though they were used in the gender appropriate to the circumstances; and whenever words are used in this note in the singular or plural form, they shall be construed as though they were used in the form appropriate to the circumstances.)    MD Rayo Peng  Attending Emergency Medicine Physician           Ana Eric MD  10/06/22 1819       Ana Eric MD  10/06/22 0954

## 2022-10-06 NOTE — DISCHARGE INSTRUCTIONS
Thank you for visiting [facility] Emergency Department. You need to call Son Tapia to make an appointment as directed for follow up. Should you have any questions regarding your care or further treatment, please call [facility] Emergency Department at [phone]. Return to emergency department for any new or worrisome symptoms including any chest pain, shortness of breath, vomiting, abdominal pain, this, weakness, tingling.

## 2022-10-06 NOTE — ED NOTES
Labeled blood specimens sent to lab via tube system.     [x] Lavender   [] on ice   [x] Blue   [x] Green/yellow  [x] Green/black [] on ice  [] Pink  [x] Red  [] Yellow  [] Blood Cultures      Heydi Platt RN  10/06/22 9453

## 2022-10-06 NOTE — ED PROVIDER NOTES
[facility] ED  Emergency Department Encounter  Emergency Medicine Resident     Pt Micheal Barajas  MRN: [id number]  [place] 1935  Date of evaluation: 10/6/22  PCP:  Amina Christiansen       Chief Complaint   Patient presents with    Aphasia     LKW 3344, sudden onset of slurred speech. Resolved at 1035   Slurred speech    HISTORY OF PRESENT ILLNESS  (Location/Symptom, Timing/Onset, Context/Setting, Quality, Duration, Modifying Factors, Severity.)      Viktor Ortez is a 81 y.o. female who presents with complaints of episode of slurred speech that occurred at patient's long-term care facility and lasted approximately 15 to 20 minutes in duration before resolving. On arrival to ED, patient notes feels at baseline. No lightheadedness, numbness, weakness, tingling, headache, vision changes. Patient denies any falls. No chest pain or shortness of breath. Does not take any blood thinners. No fevers, cough, rhinorrhea, dysuria, frequency, abdominal pain. PAST MEDICAL / SURGICAL / SOCIAL / FAMILY HISTORY      has a past medical history of Anemia, Gastroesophageal reflux disease without esophagitis, Hard of hearing, Hyperlipidemia, Hypertension, Hypothyroidism, and TIA (transient ischemic attack). Reviewed with patient     has a past surgical history that includes Tonsillectomy and Carpal tunnel release (Right). Reviewed with patient    Social History     Socioeconomic History    Marital status:       Spouse name: Not on file    Number of children: Not on file    Years of education: Not on file    Highest education level: Not on file   Occupational History    Not on file   Tobacco Use    Smoking status: Former     Types: Cigarettes     Quit date: 1970     Years since quittin.2    Smokeless tobacco: Never   Substance and Sexual Activity    Alcohol use: No     Alcohol/week: 0.0 standard drinks    Drug use: No    Sexual activity: Not Currently   Other Topics Concern    Not on file   Social History Narrative    Not on file     Social Determinants of Health     Financial Resource Strain: Not on file   Food Insecurity: Not on file   Transportation Needs: Not on file   Physical Activity: Not on file   Stress: Not on file   Social Connections: Not on file   Intimate Partner Violence: Not on file   Housing Stability: Not on file       Family History   Problem Relation Age of Onset    Cancer Mother         breast    Other Father         ulcers       Allergies:  Gluten meal and Penicillins    Home Medications:  Prior to Admission medications    Medication Sig Start Date End Date Taking?  Authorizing Provider   aspirin (ASPIRIN CHILDRENS) 81 MG chewable tablet Take 1 tablet by mouth daily for 14 days 10/6/22 10/20/22 Yes Ely Rawls MD   famotidine (PEPCID) 40 MG tablet  7/8/20   Historical Provider, MD   benztropine (COGENTIN) 0.5 MG tablet TAKE 1 TABLET BY MOUTH TWICE DAILY  Patient not taking: Reported on 9/29/2020 4/2/20   Jose Sargent DO   ibandronate (BONIVA) 150 MG tablet SEE NOTES 3/23/20   Duran Amin MD   levothyroxine (SYNTHROID) 125 MCG tablet TAKE 1 TABLET BY MOUTH EVERY DAY 1/2/20   Jose Sargent DO   fosinopril (MONOPRIL) 20 MG tablet TAKE 1 TABLET BY MOUTH EVERY DAY 11/10/19   MUMTAZ Reid - CNP   Biotin 5000 MCG CAPS Take by mouth    Historical Provider, MD   Pyridoxine HCl (VITAMIN B-6) 100 MG tablet Take 100 mg by mouth daily    Historical Provider, MD   Cranberry-Vitamin C-Vitamin E (CRANBERRY PLUS VITAMIN C PO) Take by mouth    Historical Provider, MD   Cholecalciferol (VITAMIN D3) 2000 UNITS CAPS Take by mouth daily    Historical Provider, MD   vitamin E 400 UNIT capsule Take 400 Units by mouth daily    Historical Provider, MD   Cyanocobalamin (B-12) 1000 MCG SUBL Place under the tongue    Historical Provider, MD   Nutritional Supplements (OSTEO ADVANCE PO) Take by mouth    Historical Provider, MD   folic acid (FOLVITE) 391 MCG tablet Take 800 mcg by mouth daily    Historical Provider, MD   Omega-3 Fatty Acids (FISH OIL) 1200 MG CAPS Take by mouth    Historical Provider, MD   Multiple Vitamins-Minerals (CENTRUM SILVER PO) Take by mouth    Historical Provider, MD       REVIEW OF SYSTEMS    (2-9 systems for level 4, 10 or more for level 5)      Review of Systems   Constitutional:  Negative for chills and fever. HENT:  Negative for congestion and rhinorrhea. Eyes:  Negative for photophobia and visual disturbance. Respiratory:  Negative for shortness of breath and wheezing. Cardiovascular:  Negative for chest pain and palpitations. Gastrointestinal:  Negative for abdominal pain, nausea and vomiting. Genitourinary:  Negative for dysuria and frequency. Musculoskeletal:  Negative for back pain and neck pain. Skin:  Negative for rash and wound. Neurological:  Negative for dizziness and headaches. PHYSICAL EXAM   (up to 7 for level 4, 8 or more for level 5)      INITIAL VITALS:   BP (!) 173/81   Pulse 89   Temp 97.6 °F (36.4 °C) (Oral)   Resp 16   Ht 5' 6\" (1.676 m)   Wt 128 lb (58.1 kg)   SpO2 100%   BMI 20.66 kg/m²     Physical Exam  Vitals and nursing note reviewed. Constitutional:       General: She is not in acute distress. HENT:      Head: Atraumatic. Right Ear: External ear normal.      Left Ear: External ear normal.      Nose: Nose normal.      Mouth/Throat:      Mouth: Mucous membranes are moist.      Pharynx: Oropharynx is clear. Eyes:      Conjunctiva/sclera: Conjunctivae normal.   Cardiovascular:      Rate and Rhythm: Normal rate and regular rhythm. Pulses: Normal pulses. Pulmonary:      Effort: Pulmonary effort is normal. No respiratory distress. Breath sounds: Normal breath sounds. No wheezing. Abdominal:      Palpations: Abdomen is soft. Tenderness: There is no abdominal tenderness. Musculoskeletal:         General: Normal range of motion.       Cervical back: Normal range of motion. Skin:     General: Skin is warm and dry. Capillary Refill: Capillary refill takes less than 2 seconds. Neurological:      General: No focal deficit present. Mental Status: She is alert and oriented to person, place, and time.      DIFFERENTIAL  DIAGNOSIS     PLAN (LABS / IMAGING / EKG):  Orders Placed This Encounter   Procedures    CT HEAD WO CONTRAST    CTA HEAD NECK W CONTRAST    XR CHEST PORTABLE    ELECTROLYTES PLUS    Hemoglobin and hematocrit, blood    CALCIUM, IONIC (POC)    STROKE PANEL    Urinalysis with Reflex to Culture    TSH with Reflex    Microscopic Urinalysis    LAB SCANNED REPORT    Inpatient consult to Stroke Team    Venous Blood Gas, POC    Creatinine W/GFR Point of Care    POCT urea (BUN)    Lactic Acid, POC    POCT Glucose    EKG 12 Lead       MEDICATIONS ORDERED:  Orders Placed This Encounter   Medications    iopamidol (ISOVUE-370) 76 % injection 90 mL    aspirin chewable tablet 324 mg    aspirin (ASPIRIN CHILDRENS) 81 MG chewable tablet     Sig: Take 1 tablet by mouth daily for 14 days     Dispense:  14 tablet     Refill:  0       DDX: TIA, CVA, electrolyte abnormality, UTI    DIAGNOSTIC RESULTS / EMERGENCY DEPARTMENT COURSE / MDM   LAB RESULTS:  Results for orders placed or performed during the hospital encounter of 10/06/22   ELECTROLYTES PLUS   Result Value Ref Range    POC Sodium 147 (H) 138 - 146 mmol/L    POC Potassium 3.9 3.5 - 4.5 mmol/L    POC Chloride 116 (H) 98 - 107 mmol/L    POC TCO2 21 (L) 22 - 30 mmol/L    Anion Gap 11 7 - 16 mmol/L   Hemoglobin and hematocrit, blood   Result Value Ref Range    POC Hemoglobin 12.2 12.0 - 16.0 g/dL    POC Hematocrit 36 36 - 46 %   CALCIUM, IONIC (POC)   Result Value Ref Range    POC Ionized Calcium 1.27 1.15 - 1.33 mmol/L   STROKE PANEL   Result Value Ref Range    Glucose 125 (H) 70 - 99 mg/dL    BUN 20 8 - 23 mg/dL    Creatinine 0.88 0.50 - 0.90 mg/dL    Est, Glom Filt Rate >60 >60 mL/min/1.73m2    Calcium 8.7 8.6 - 10.4 mg/dL    Sodium 141 135 - 144 mmol/L    Potassium 4.0 3.7 - 5.3 mmol/L    Chloride 112 (H) 98 - 107 mmol/L    CO2 17 (L) 20 - 31 mmol/L    Anion Gap 12 9 - 17 mmol/L    WBC 5.4 3.5 - 11.3 k/uL    RBC 3.68 (L) 3.95 - 5.11 m/uL    Hemoglobin 11.6 (L) 11.9 - 15.1 g/dL    Hematocrit 35.0 (L) 36.3 - 47.1 %    MCV 95.1 82.6 - 102.9 fL    MCH 31.5 25.2 - 33.5 pg    MCHC 33.1 28.4 - 34.8 g/dL    RDW 12.9 11.8 - 14.4 %    Platelets 378 570 - 260 k/uL    MPV 10.1 8.1 - 13.5 fL    NRBC Automated 0.0 0.0 per 100 WBC    Total CK 49 26 - 192 U/L    Seg Neutrophils 54 36 - 65 %    Lymphocytes 29 24 - 43 %    Monocytes 15 (H) 3 - 12 %    Eosinophils % 2 1 - 4 %    Basophils 0 0 - 2 %    Immature Granulocytes 0 0 %    Segs Absolute 2.84 1.50 - 8.10 k/uL    Absolute Lymph # 1.58 1.10 - 3.70 k/uL    Absolute Mono # 0.81 0.10 - 1.20 k/uL    Absolute Eos # 0.11 0.00 - 0.44 k/uL    Basophils Absolute <0.03 0.00 - 0.20 k/uL    Absolute Immature Granulocyte <0.03 0.00 - 0.30 k/uL    Myoglobin 56 25 - 58 ng/mL    Protime 10.2 9.1 - 12.3 sec    INR 1.0     PTT 24.6 20.5 - 30.5 sec    Troponin, High Sensitivity 18 (H) 0 - 14 ng/L   Urinalysis with Reflex to Culture    Specimen: Urine   Result Value Ref Range    Color, UA Yellow Yellow    Turbidity UA Clear Clear    Glucose, Ur NEGATIVE NEGATIVE    Bilirubin Urine NEGATIVE NEGATIVE    Ketones, Urine NEGATIVE NEGATIVE    Specific Gravity, UA 1.017 1.005 - 1.030    Urine Hgb NEGATIVE NEGATIVE    pH, UA 5.5 5.0 - 8.0    Protein, UA NEGATIVE NEGATIVE    Urobilinogen, Urine Normal Normal    Nitrite, Urine NEGATIVE NEGATIVE    Leukocyte Esterase, Urine TRACE (A) NEGATIVE   TSH with Reflex   Result Value Ref Range    TSH 4.21 0.30 - 5.00 uIU/mL   Microscopic Urinalysis   Result Value Ref Range    WBC, UA 5 TO 10 0 - 5 /HPF    RBC, UA None 0 - 4 /HPF    Epithelial Cells UA 0 TO 2 0 - 5 /HPF   Venous Blood Gas, POC   Result Value Ref Range    pH, Dann 7.252 (L) 7.320 - 7.430    pCO2, Dann 47.2 41.0 - 51.0 mm Hg    pO2, Dann 17.8 (L) 30.0 - 50.0 mm Hg    HCO3, Venous 20.8 (L) 22.0 - 29.0 mmol/L    Negative Base Excess, Dann 6 (H) 0.0 - 2.0    O2 Sat, Dann 20 (L) 60.0 - 85.0 %   Creatinine W/GFR Point of Care   Result Value Ref Range    POC Creatinine 1.08 0.51 - 1.19 mg/dL    GFR Comment 59 (L) >60 mL/min    GFR Non- 48 (L) >60 mL/min    eGFR, POC 50 mL/min/1.73m2   POCT urea (BUN)   Result Value Ref Range    POC BUN 21 8 - 26 mg/dL   Lactic Acid, POC   Result Value Ref Range    POC Lactic Acid 0.76 0.56 - 1.39 mmol/L   POCT Glucose   Result Value Ref Range    POC Glucose 132 (H) 74 - 100 mg/dL   EKG 12 Lead   Result Value Ref Range    Ventricular Rate 79 BPM    Atrial Rate 79 BPM    P-R Interval 142 ms    QRS Duration 88 ms    Q-T Interval 378 ms    QTc Calculation (Bazett) 433 ms    P Axis 82 degrees    R Axis 44 degrees    T Axis 37 degrees       IMPRESSION: TIA    RADIOLOGY:  CT HEAD WO CONTRAST   Final Result   Atrophy and chronic microvascular disease without acute intracranial   abnormality. Findings were sent to Irlanda Leblanc at 12:30 pm on 10/6/2022. CTA HEAD NECK W CONTRAST   Final Result   Proximally 35% stenosis in the proximal right internal carotid artery by   NASCET criteria. 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 Scattered foci of stenosis throughout the V2   portion of the left vertebral artery. Mild stenosis in the supraclinoid portion of the internal carotid arteries   bilaterally. Moderate to severe foci of stenosis in the V4 portion of the left vertebral   artery.          XR CHEST PORTABLE   Final Result   No acute intrathoracic process             EMERGENCY DEPARTMENT COURSE:  52-year-old female, history TIA, presented to ED with complaints of episode of slurred speech that lasted approximately 20 minutes in duration prior to arrival and at the time patient arrived to ED felt back at baseline. On exam, afebrile, nontachycardic, neuro exam nonfocal.  NIH 0. Stroke consult called. CT head and CTA head and neck obtained. Stroke team recommended starting on aspirin and follow-up outpatient. No further episodes of difficulty speaking in ED. UA showed trace leukocyte Estrace but patient without any symptoms of urine infection so plan to send for culture and not treat at this time for UTI. Instructed patient to return for any return of difficulty speaking, numbness, weakness, tingling, headache, vision changes. Patient verbalized understanding and agreeable to plan. ED Course as of 10/07/22 2212   Thu Oct 06, 2022   1141 Spoke with neuro who is agreeable with plan to get CT CTA head and neck. Likely will start on aspirin. [AR]   1220 WBC: 5.4 [AR]   1313 Resting comfortably. Awaiting final neuro Cristi. Did update them about CTA head and neck consistent with V4's moderate to severe stenosis [AR]      ED Course User Index  [AR] Andree Kussmaul, MD   INITIAL NIH STROKE SCALE    Time Performed:  1110 AM     1a. Level of consciousness:  0 - alert; keenly responsive  1b. Level of consciousness questions:  0 - answers both questions correctly  1c. Level of consciousness questions:  0 - performs both tasks correctly  2. Best Gaze:  0 - normal  3. Visual:  0 - no visual loss  4. Facial Palsy:  0 - normal symmetric movement  5a. Motor left arm:  0 - no drift, limb holds 90 (or 45) degrees for full 10 seconds  5b. Motor right arm:  0 - no drift, limb holds 90 (or 45) degrees for full 10 seconds  6a. Motor left le - no drift; leg holds 30 degree position for full 5 seconds  6b. Motor right le - no drift; leg holds 30 degree position for full 5 seconds  7. Limb Ataxia:  0 - absent  8. Sensory:  0 - normal; no sensory loss  9. Best Language:  0 - no aphasia, normal  10. Dysarthria:  0 - normal  11.   Extinction and Inattention:  0 - no abnormality    TOTAL:  0       No notes of EC Admission Criteria type on file. PROCEDURES:  None    CONSULTS:  IP CONSULT TO STROKE TEAM    FINAL IMPRESSION      1.  TIA (transient ischemic attack)          DISPOSITION / PLAN     DISPOSITION Decision To Discharge 10/06/2022 01:46:27 PM      [facility]  [facility]  [address] [facility] [phone]  Schedule an appointment as soon as possible for a visit in 2 week(s)  post ED, TIA evaluation    [facility]  [address] [place] [address]  [phone]    As needed    DISCHARGE MEDICATIONS:  Discharge Medication List as of 10/6/2022  1:47 PM        START taking these medications    Details   aspirin (ASPIRIN CHILDRENS) 81 MG chewable tablet Take 1 tablet by mouth daily for 14 days, Disp-14 tablet, R-0Print             Ian Delgado MD  Emergency Medicine Resident    (Please note that portions of thisnote were completed with a voice recognition program.  Efforts were made to edit the dictations but occasionally words are mis-transcribed.)         Andree Kussmaul, MD  Resident  10/07/22 5908

## 2022-10-06 NOTE — ED NOTES
Pt attempting to leave ED, wandering in the hallways stating she is leaving. Pt educated multiple times that she needs to wait for DC papers and transport to facility. Pt refusing to go back in her room. Dr. Aubree Islas spoke with pt in hallway and informed her that she is being DC'ed and that she needs to wait for transport. Pt stated okay and that she was not returning to room, that she was sitting in RW. Writer spoke with SW regarding transportation back to nursing facility. SW to work on transport. Pt updated.      Jaren Murdock, RN  10/06/22 4126

## 2022-10-06 NOTE — PROGRESS NOTES
[address] [facility] [address]     Emergency/Trauma Note    PATIENT NAME: Jayson Browne    Shift date: 10/06/22  Shift day: Thursday   Shift # 2    Room # 29/29   Name: Jayson Browne            Age: 81 y.o. Gender: female          [employer]: [address] [employer]:     Trauma/Incident type: Stroke Alert/Consult  Admit Date & Time: 10/6/2022 11:04 AM  TRAUMA NAME: N/A    ADVANCE DIRECTIVES IN CHART? No    NAME OF DECISION MAKER: N/A    RELATIONSHIP OF DECISION MAKER TO PATIENT: N/A    PATIENT/EVENT DESCRIPTION:  Jayson Browne is a 81 y.o. female who arrived at [facility] Pt to be admitted to 29/29. SPIRITUAL ASSESSMENT-INTERVENTION-OUTCOME:   encountered patient outside room sitting down in chair. Patient stated she couldn't hear that well and appeared dressed and ready to leave. Patient expressed frustration and anxiety due to waiting for a ride to pick her up.  overheard RN state patient's ride was on the way.  communicated with patient. Patient said 'Goodbye' to  soon after. PATIENT BELONGINGS:   did not handle patient belongings     ANY BELONGINGS OF SIGNIFICANT VALUE NOTED:  N/A    REGISTRATION STAFF NOTIFIED? Yes      WHAT IS YOUR SPIRITUAL CARE PLAN FOR THIS PATIENT?:   Chaplains will remain available to offer spiritual and emotional support as needed.     Electronically signed by Chrystal Joyner on 10/6/2022 at 4:47 PM.  [facility]  [phone]

## 2022-10-06 NOTE — ED NOTES
Give fluid supplementation her sodium was low at 134  Stable.   Resolved.    Report received from  [facility]  10/06/22 1129

## 2022-10-06 NOTE — ED NOTES
Pt back in ED Room 29. Pt updated on transport at 1630. Pt asked if writer would call son and see if he could  the pt. Writer called son and left voicemail with callback number. Pt given meal tray.      Susan Marie RN  10/06/22 2464

## 2022-10-06 NOTE — ED NOTES
Labeled urine specimen sent to lab via tube system.     [x] Urine Sample   []  Clean catch   [] Straight cath   [x] Urine voided   []  Indwelling catheter   []  Suprapubic catheter     Shiv Grande, TIERRA  10/06/22 9499

## 2022-10-06 NOTE — ED NOTES
Patient assisted back to chair in [facility], waiting for [facility] [address] for transport to [facility] in [address]  10/06/22 7032

## 2022-10-06 NOTE — CONSULTS
Department of Endovascular Neurosurgery  Resident Consult Note  Stroke Consult Paged @ [id number]  ER Room # 29  Arrival to patient bedside @ 1120AM        Reason for Consult:  stroke consult  Requesting Physician:  ED  Endovascular Neurosurgeon:   [x]Dr. Lazaro Knapp  []Dr. Ganesh Cartwright  []Dr. Luz Elena Torres   []    History Obtained From:  patient, electronic medical record    CHIEF COMPLAINT:       Transient speech difficulty    HISTORY OF PRESENT ILLNESS:       The patient is a 81 y.o. female Nursing Home resident with PMH of HTN, HLP, Hypothyroid, Hard of hearing, ?prior TIA who presents via EMS after being noted to have a transient episode of speech difficulty, reported to be for approximately 20min. Symptoms subsided by the time EMS arrived to nursing home. Last know well: unknown    On presentation:  BP: 150/102  BSL: 132    Prior to arrival patient was on  Antiplatelets/anticoagulants: no  Statins: atorvastatin 10mg      Smoking history: former smoker, quit        PAST MEDICAL HISTORY :       Past Medical History:        Diagnosis Date    Anemia     Gastroesophageal reflux disease without esophagitis 2017    Hard of hearing     Hyperlipidemia     Hypertension     Hypothyroidism     TIA (transient ischemic attack)        Past Surgical History:        Procedure Laterality Date    CARPAL TUNNEL RELEASE Right     TONSILLECTOMY      age 16       Social History:   Social History     Socioeconomic History    Marital status:       Spouse name: Not on file    Number of children: Not on file    Years of education: Not on file    Highest education level: Not on file   Occupational History    Not on file   Tobacco Use    Smoking status: Former     Types: Cigarettes     Quit date: 1970     Years since quittin.2    Smokeless tobacco: Never   Substance and Sexual Activity    Alcohol use: No     Alcohol/week: 0.0 standard drinks    Drug use: No    Sexual activity: Not Currently   Other Topics Concern    Not on file   Social History Narrative    Not on file     Social Determinants of Health     Financial Resource Strain: Not on file   Food Insecurity: Not on file   Transportation Needs: Not on file   Physical Activity: Not on file   Stress: Not on file   Social Connections: Not on file   Intimate Partner Violence: Not on file   Housing Stability: Not on file       Family History:       Problem Relation Age of Onset    Cancer Mother         breast    Other Father         ulcers       Allergies:  Gluten meal and Penicillins    Home Medications:  Prior to Admission medications    Medication Sig Start Date End Date Taking?  Authorizing Provider   famotidine (PEPCID) 40 MG tablet  7/8/20   Historical Provider, MD   benztropine (COGENTIN) 0.5 MG tablet TAKE 1 TABLET BY MOUTH TWICE DAILY  Patient not taking: Reported on 9/29/2020 4/2/20   Denise Lubin DO   ibandronate (BONIVA) 150 MG tablet SEE NOTES 3/23/20   Goldie Bach MD   levothyroxine (SYNTHROID) 125 MCG tablet TAKE 1 TABLET BY MOUTH EVERY DAY 1/2/20   Denise Lubin DO   fosinopril (MONOPRIL) 20 MG tablet TAKE 1 TABLET BY MOUTH EVERY DAY 11/10/19   MUMTAZ Anton - CNP   Biotin 5000 MCG CAPS Take by mouth    Historical Provider, MD   Pyridoxine HCl (VITAMIN B-6) 100 MG tablet Take 100 mg by mouth daily    Historical Provider, MD   Cranberry-Vitamin C-Vitamin E (CRANBERRY PLUS VITAMIN C PO) Take by mouth    Historical Provider, MD   Cholecalciferol (VITAMIN D3) 2000 UNITS CAPS Take by mouth daily    Historical Provider, MD   vitamin E 400 UNIT capsule Take 400 Units by mouth daily    Historical Provider, MD   Cyanocobalamin (B-12) 1000 MCG SUBL Place under the tongue    Historical Provider, MD   Nutritional Supplements (OSTEO ADVANCE PO) Take by mouth    Historical Provider, MD   folic acid (FOLVITE) 829 MCG tablet Take 800 mcg by mouth daily    Historical Provider, MD   Omega-3 Fatty Acids (FISH OIL) 1200 MG CAPS Take by mouth    Historical Provider, MD   Multiple Vitamins-Minerals (CENTRUM SILVER PO) Take by mouth    Historical Provider, MD       Current Medications:   No current facility-administered medications for this encounter. REVIEW OF SYSTEMS:       CONSTITUTIONAL: negative for fatigue and malaise   EYES: negative for double vision and photophobia    HEENT: negative for tinnitus and sore throat   RESPIRATORY: negative for cough, shortness of breath   CARDIOVASCULAR: negative for chest pain, palpitations   GASTROINTESTINAL: negative for nausea, vomiting   GENITOURINARY: negative for incontinence   MUSCULOSKELETAL: negative for neck or back pain   NEUROLOGICAL: negative for seizures   PSYCHIATRIC: negative for fatigue     Review of systems otherwise negative. PHYSICAL EXAM:       BP (!) 150/102   Pulse 79   Temp 97.6 °F (36.4 °C) (Oral)   Resp 18   Ht 5' 6\" (1.676 m)   Wt 128 lb (58.1 kg)   SpO2 100%   BMI 20.66 kg/m²     CONSTITUTIONAL:  Well developed, well nourished, alert and oriented x 3, in no acute distress. GCS 15, nontoxic. No dysarthria, no aphasia.    HEAD:  normocephalic, atraumatic    EYES:  PERRLA, EOMI.   ENT:  moist mucous membranes   NECK:  supple, symmetric, no midline tenderness to palpation    BACK:  without midline tenderness, step-offs or deformities    LUNGS:  Equal air entry bilaterally   CARDIOVASCULAR:  normal s1 / s2   ABDOMEN:  Soft, no rigidity   NEUROLOGIC:  Mental Status:  A & O x3,awake             Cranial Nerves:    cranial nerves II-XII are grossly intact    Motor Exam:    Drift:  absent  Tone:  normal    Motor exam is symmetrical 5 out of 5 all extremities bilaterally    Sensory:    Touch:    Right Upper Extremity:  normal  Left Upper Extremity:  normal  Right Lower Extremity:  normal  Left Lower Extremity:  normal    Deep Tendon Reflexes:    Right Bicep:  2+  Left Bicep:  2+  Right Knee:  2+  Left Knee:  2+    Plantar Response:  Right:  downgoing  Left:  downgoing    Clonus:  N/A  Meza's: N/A    Coordination/Dysmetria:  Heel to Shin:  Right:  normal  Left:  normal  Finger to Nose:   Right:  normal  Left:  normal   Dysdiadochokinesia:  N/A    Gait:  normal    INITIAL NIH STROKE SCALE:    Time Performed:  1120 AM     1a. Level of consciousness:  0 - alert; keenly responsive  1b. Level of consciousness questions:  0 - answers both questions correctly  1c. Level of consciousness questions:  0 - performs both tasks correctly  2. Best Gaze:  0 - normal  3. Visual:  0 - no visual loss  4. Facial Palsy:  0 - normal symmetric movement  5a. Motor left arm:  0 - no drift, limb holds 90 (or 45) degrees for full 10 seconds  5b. Motor right arm:  0 - no drift, limb holds 90 (or 45) degrees for full 10 seconds  6a. Motor left le - no drift; leg holds 30 degree position for full 5 seconds  6b. Motor right le - no drift; leg holds 30 degree position for full 5 seconds  7. Limb Ataxia:  0 - absent  8. Sensory:  0 - normal; no sensory loss  9. Best Language:  0 - no aphasia, normal  10. Dysarthria:  0 - normal  11.   Extinction and Inattention:  0 - no abnormality    TOTAL:  0     SKIN:  no rash      Modified Hydes Score Scale:     [x] Zero: No symptoms at all   [] 1: No significant disability despite symptoms; able to carry out all usual duties and activities   [] 2: Slight disability; unable to carry out all previous activities, but able to look after own affairs without assistance   [] 3:Moderate disability; requiring some help, but able to walk without assistance   [] 4: Moderately severe disability; unable to walk and attend to bodily needs without assistance   [] 5:Severe disability; bedridden, incontinent and requiring constant nursing care and attention      LABS AND IMAGING:     CBC with Differential:    Lab Results   Component Value Date/Time    WBC 7.8 2019 06:20 PM    RBC 4.12 2019 06:20 PM    HGB 13.0 2019 06:20 PM    HCT 38.7 2019 06:20 PM    PLT 253 08/14/2019 06:20 PM    MCV 93.9 08/14/2019 06:20 PM    MCH 31.6 08/14/2019 06:20 PM    MCHC 33.6 08/14/2019 06:20 PM    RDW 11.9 08/14/2019 06:20 PM    LYMPHOPCT 30 08/14/2019 06:20 PM    LYMPHOPCT 35.7 08/06/2018 12:00 AM    MONOPCT 16 08/14/2019 06:20 PM    MONOPCT 13.8 08/06/2018 12:00 AM    EOSPCT 1.9 08/06/2018 12:00 AM    BASOPCT 1 08/14/2019 06:20 PM    BASOPCT 0.6 08/06/2018 12:00 AM    MONOSABS 1.26 08/14/2019 06:20 PM    MONOSABS 0.6 08/06/2018 12:00 AM    LYMPHSABS 2.31 08/14/2019 06:20 PM    LYMPHSABS 1.6 08/06/2018 12:00 AM    EOSABS 0.10 08/14/2019 06:20 PM    EOSABS 0.1 08/06/2018 12:00 AM    BASOSABS 0.04 08/14/2019 06:20 PM    DIFFTYPE NOT REPORTED 08/14/2019 06:20 PM         BMP:    Lab Results   Component Value Date/Time     08/14/2019 06:20 PM    K 4.6 08/14/2019 06:20 PM     08/14/2019 06:20 PM    CO2 21 08/14/2019 06:20 PM    BUN 24 08/14/2019 06:20 PM    LABALBU 4.0 04/08/2019 03:18 PM    CREATININE 1.08 10/06/2022 11:07 AM    CREATININE 0.73 08/14/2019 06:20 PM    CALCIUM 10.7 08/14/2019 06:20 PM    GFRAA >60 08/14/2019 06:20 PM    LABGLOM 48 10/06/2022 11:07 AM    GLUCOSE 136 08/14/2019 06:20 PM       Radiology Review:    1.) CT brain without contrast:  Impression   Atrophy and chronic microvascular disease without acute intracranial   abnormality. Findings were sent to [facility] at 12:30 pm on 10/6/2022.     2.) CTA Head/Neck:  Impression   Proximally 35% stenosis in the proximal right internal carotid artery by   NASCET criteria. 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 Scattered foci of stenosis throughout the V2   portion of the left vertebral artery. Mild stenosis in the supraclinoid portion of the internal carotid arteries   bilaterally. Moderate to severe foci of stenosis in the V4 portion of the left vertebral   artery.      3.) Brain MRI W/O:     ASSESSMENT AND PLAN:       Patient Active Problem List   Diagnosis    Mixed hyperlipidemia    Essential hypertension    Hypothyroidism    Vitamin D deficiency    Hallucinations, visual    Vitamin B12 deficiency    History of gallstones    COPD, mild (HCC)    Basal cell carcinoma of back    Arthritis of left shoulder region    Gastroesophageal reflux disease without esophagitis    Celiac disease    Duodenitis    Type 2 diabetes mellitus without complication, without long-term current use of insulin ([facility] [address]       Assessment                 81 y.o. female [facility] resident with PMH of HTN, HLP, Hypothyroid, Hard of hearing, ?prior TIA who presents via EMS after being noted to have a transient episode of speech difficulty, reported to be for approximately 20min. Symptoms subsided by the time EMS arrived to nursing home. Possible TIA, ABCD2 score of 4    Last Known Well (date and time): unknown    2. Candidate for IV Tenecteplase therapy     Yes []     No  [x] due to the following exclusion criteria: unknown window and symptoms resolved    3. Candidate for Thrombectomy    Yes []      No [x] due to the following exclusion criteria: no lvo    - Discussed with Dr. Javier Verduzco     Recommendations:      Clinically, patient is doing well. Imaging findings reviewed and will start on ASA 81mg daily  Continue statin  OK to discharge from stroke standpoint with outpatient neurology evaluation. Additional recommendations may follow    Please contact [facility] with any changes in patients neurologic status. Thank you for your consult.        Jayde Morgan MD   PGY 4 Neurology Resident  10/6/2022 at 11:20 AM

## 2022-10-07 LAB
EKG ATRIAL RATE: 79 BPM
EKG P AXIS: 82 DEGREES
EKG P-R INTERVAL: 142 MS
EKG Q-T INTERVAL: 378 MS
EKG QRS DURATION: 88 MS
EKG QTC CALCULATION (BAZETT): 433 MS
EKG R AXIS: 44 DEGREES
EKG T AXIS: 37 DEGREES
EKG VENTRICULAR RATE: 79 BPM

## 2023-02-09 ENCOUNTER — HOSPITAL ENCOUNTER (INPATIENT)
Age: 88
LOS: 8 days | Discharge: NURSING, REHAB OR LONG-TERM CARE | End: 2023-02-17
Attending: EMERGENCY MEDICINE | Admitting: FAMILY MEDICINE
Payer: MEDICARE

## 2023-02-09 DIAGNOSIS — L08.9 WOUND INFECTION: Primary | ICD-10-CM

## 2023-02-09 DIAGNOSIS — T14.8XXA WOUND INFECTION: Primary | ICD-10-CM

## 2023-02-09 DIAGNOSIS — S31.000S WOUND OF SACRAL REGION, SEQUELA: ICD-10-CM

## 2023-02-09 DIAGNOSIS — L89.154 PRESSURE ULCER OF COCCYGEAL REGION, STAGE 4 (HCC): ICD-10-CM

## 2023-02-09 PROBLEM — L03.90 CELLULITIS: Status: ACTIVE | Noted: 2023-02-09

## 2023-02-09 LAB
ABSOLUTE EOS #: 0.03 K/UL (ref 0–0.44)
ABSOLUTE IMMATURE GRANULOCYTE: 0.06 K/UL (ref 0–0.3)
ABSOLUTE LYMPH #: 0.82 K/UL (ref 1.1–3.7)
ABSOLUTE MONO #: 1.22 K/UL (ref 0.1–1.2)
ANION GAP SERPL CALCULATED.3IONS-SCNC: 11 MMOL/L (ref 9–17)
BASOPHILS # BLD: 0 % (ref 0–2)
BASOPHILS ABSOLUTE: 0.04 K/UL (ref 0–0.2)
BUN SERPL-MCNC: 23 MG/DL (ref 8–23)
BUN/CREAT BLD: 29 (ref 9–20)
CALCIUM SERPL-MCNC: 9.4 MG/DL (ref 8.6–10.4)
CHLORIDE SERPL-SCNC: 103 MMOL/L (ref 98–107)
CO2 SERPL-SCNC: 20 MMOL/L (ref 20–31)
CREAT SERPL-MCNC: 0.8 MG/DL (ref 0.5–0.9)
CRP SERPL HS-MCNC: 73.1 MG/L (ref 0–5)
EOSINOPHILS RELATIVE PERCENT: 0 % (ref 1–4)
ERYTHROCYTE [SEDIMENTATION RATE] IN BLOOD BY WESTERGREN METHOD: 82 MM/HR (ref 0–30)
GFR SERPL CREATININE-BSD FRML MDRD: >60 ML/MIN/1.73M2
GLUCOSE SERPL-MCNC: 252 MG/DL (ref 70–99)
HCT VFR BLD AUTO: 34.2 % (ref 36.3–47.1)
HGB BLD-MCNC: 10.7 G/DL (ref 11.9–15.1)
IMMATURE GRANULOCYTES: 1 %
LACTIC ACID, SEPSIS: 2.4 MMOL/L (ref 0.5–1.9)
LACTIC ACID, SEPSIS: 2.7 MMOL/L (ref 0.5–1.9)
LYMPHOCYTES # BLD: 6 % (ref 24–43)
MCH RBC QN AUTO: 30.1 PG (ref 25.2–33.5)
MCHC RBC AUTO-ENTMCNC: 31.3 G/DL (ref 28.4–34.8)
MCV RBC AUTO: 96.1 FL (ref 82.6–102.9)
MONOCYTES # BLD: 9 % (ref 3–12)
NRBC AUTOMATED: 0 PER 100 WBC
PDW BLD-RTO: 12.7 % (ref 11.8–14.4)
PLATELET # BLD AUTO: 265 K/UL (ref 138–453)
PMV BLD AUTO: 9.2 FL (ref 8.1–13.5)
POTASSIUM SERPL-SCNC: 4.2 MMOL/L (ref 3.7–5.3)
RBC # BLD: 3.56 M/UL (ref 3.95–5.11)
SEG NEUTROPHILS: 84 % (ref 36–65)
SEGMENTED NEUTROPHILS ABSOLUTE COUNT: 10.81 K/UL (ref 1.5–8.1)
SODIUM SERPL-SCNC: 134 MMOL/L (ref 135–144)
WBC # BLD AUTO: 13 K/UL (ref 3.5–11.3)

## 2023-02-09 PROCEDURE — 86403 PARTICLE AGGLUT ANTBDY SCRN: CPT

## 2023-02-09 PROCEDURE — 87205 SMEAR GRAM STAIN: CPT

## 2023-02-09 PROCEDURE — 85025 COMPLETE CBC W/AUTO DIFF WBC: CPT

## 2023-02-09 PROCEDURE — 85652 RBC SED RATE AUTOMATED: CPT

## 2023-02-09 PROCEDURE — 80048 BASIC METABOLIC PNL TOTAL CA: CPT

## 2023-02-09 PROCEDURE — 83605 ASSAY OF LACTIC ACID: CPT

## 2023-02-09 PROCEDURE — 99285 EMERGENCY DEPT VISIT HI MDM: CPT

## 2023-02-09 PROCEDURE — 6360000002 HC RX W HCPCS: Performed by: NURSE PRACTITIONER

## 2023-02-09 PROCEDURE — 1200000000 HC SEMI PRIVATE

## 2023-02-09 PROCEDURE — 2580000003 HC RX 258: Performed by: NURSE PRACTITIONER

## 2023-02-09 PROCEDURE — 87070 CULTURE OTHR SPECIMN AEROBIC: CPT

## 2023-02-09 PROCEDURE — 87075 CULTR BACTERIA EXCEPT BLOOD: CPT

## 2023-02-09 PROCEDURE — 36415 COLL VENOUS BLD VENIPUNCTURE: CPT

## 2023-02-09 PROCEDURE — 87040 BLOOD CULTURE FOR BACTERIA: CPT

## 2023-02-09 PROCEDURE — 86140 C-REACTIVE PROTEIN: CPT

## 2023-02-09 RX ORDER — POTASSIUM CHLORIDE 20 MEQ/1
40 TABLET, EXTENDED RELEASE ORAL PRN
Status: DISCONTINUED | OUTPATIENT
Start: 2023-02-09 | End: 2023-02-17 | Stop reason: HOSPADM

## 2023-02-09 RX ORDER — ONDANSETRON 2 MG/ML
4 INJECTION INTRAMUSCULAR; INTRAVENOUS EVERY 6 HOURS PRN
Status: DISCONTINUED | OUTPATIENT
Start: 2023-02-09 | End: 2023-02-17 | Stop reason: HOSPADM

## 2023-02-09 RX ORDER — SODIUM CHLORIDE 9 MG/ML
INJECTION, SOLUTION INTRAVENOUS CONTINUOUS
Status: DISCONTINUED | OUTPATIENT
Start: 2023-02-09 | End: 2023-02-17 | Stop reason: HOSPADM

## 2023-02-09 RX ORDER — ONDANSETRON 4 MG/1
4 TABLET, ORALLY DISINTEGRATING ORAL EVERY 8 HOURS PRN
Status: DISCONTINUED | OUTPATIENT
Start: 2023-02-09 | End: 2023-02-17 | Stop reason: HOSPADM

## 2023-02-09 RX ORDER — SODIUM CHLORIDE 0.9 % (FLUSH) 0.9 %
10 SYRINGE (ML) INJECTION PRN
Status: DISCONTINUED | OUTPATIENT
Start: 2023-02-09 | End: 2023-02-17 | Stop reason: HOSPADM

## 2023-02-09 RX ORDER — FAMOTIDINE 20 MG/1
20 TABLET, FILM COATED ORAL DAILY
Status: DISCONTINUED | OUTPATIENT
Start: 2023-02-09 | End: 2023-02-17 | Stop reason: HOSPADM

## 2023-02-09 RX ORDER — ACETAMINOPHEN 325 MG/1
650 TABLET ORAL EVERY 6 HOURS PRN
Status: DISCONTINUED | OUTPATIENT
Start: 2023-02-09 | End: 2023-02-17 | Stop reason: HOSPADM

## 2023-02-09 RX ORDER — SODIUM CHLORIDE 9 MG/ML
INJECTION, SOLUTION INTRAVENOUS PRN
Status: DISCONTINUED | OUTPATIENT
Start: 2023-02-09 | End: 2023-02-17 | Stop reason: HOSPADM

## 2023-02-09 RX ORDER — ENOXAPARIN SODIUM 100 MG/ML
40 INJECTION SUBCUTANEOUS DAILY
Status: DISCONTINUED | OUTPATIENT
Start: 2023-02-10 | End: 2023-02-17 | Stop reason: HOSPADM

## 2023-02-09 RX ORDER — SODIUM CHLORIDE 0.9 % (FLUSH) 0.9 %
5-40 SYRINGE (ML) INJECTION EVERY 12 HOURS SCHEDULED
Status: DISCONTINUED | OUTPATIENT
Start: 2023-02-09 | End: 2023-02-17 | Stop reason: HOSPADM

## 2023-02-09 RX ORDER — POTASSIUM CHLORIDE 7.45 MG/ML
10 INJECTION INTRAVENOUS PRN
Status: DISCONTINUED | OUTPATIENT
Start: 2023-02-09 | End: 2023-02-17 | Stop reason: HOSPADM

## 2023-02-09 RX ORDER — MAGNESIUM SULFATE 1 G/100ML
1000 INJECTION INTRAVENOUS PRN
Status: DISCONTINUED | OUTPATIENT
Start: 2023-02-09 | End: 2023-02-17 | Stop reason: HOSPADM

## 2023-02-09 RX ORDER — ACETAMINOPHEN 650 MG/1
650 SUPPOSITORY RECTAL EVERY 6 HOURS PRN
Status: DISCONTINUED | OUTPATIENT
Start: 2023-02-09 | End: 2023-02-17 | Stop reason: HOSPADM

## 2023-02-09 RX ADMIN — SODIUM CHLORIDE, PRESERVATIVE FREE 10 ML: 5 INJECTION INTRAVENOUS at 22:19

## 2023-02-09 RX ADMIN — VANCOMYCIN HYDROCHLORIDE 1500 MG: 5 INJECTION, POWDER, LYOPHILIZED, FOR SOLUTION INTRAVENOUS at 21:57

## 2023-02-09 RX ADMIN — SODIUM CHLORIDE: 9 INJECTION, SOLUTION INTRAVENOUS at 18:38

## 2023-02-09 ASSESSMENT — PAIN - FUNCTIONAL ASSESSMENT: PAIN_FUNCTIONAL_ASSESSMENT: NONE - DENIES PAIN

## 2023-02-09 ASSESSMENT — LIFESTYLE VARIABLES
HOW MANY STANDARD DRINKS CONTAINING ALCOHOL DO YOU HAVE ON A TYPICAL DAY: PATIENT DOES NOT DRINK
HOW OFTEN DO YOU HAVE A DRINK CONTAINING ALCOHOL: NEVER

## 2023-02-09 ASSESSMENT — ENCOUNTER SYMPTOMS: COLOR CHANGE: 1

## 2023-02-09 NOTE — ED PROVIDER NOTES
[facility] ED  eMERGENCY dEPARTMENT eNCOUnter      Pt Name: Julieth Gonzalez  MRN: [id number]  [place] 1935  Date of evaluation: 2/9/2023  Provider: Narinder Tello       Chief Complaint   Patient presents with    Wound Infection     Open sore to coccyx          HISTORY OF PRESENT ILLNESS  (Location/Symptom, Timing/Onset, Context/Setting, Quality, Duration, Modifying Factors, Severity.)   Julieth Gonzalez is a 81 y.o. female who presents to the emergency department by EMS for evaluation of infected coccyx wound. Patient resides at [facility]. EMS reported wound care nurse at facility was concerned of infection to the patient's coccyx wound. Patient not currently on antibiotics. Patient has history of dementia, hard of hearing, hypertension, hyperlipidemia, anemia, and GERD. She has mild pain to the coccyx area. Nursing Notes were reviewed.     ALLERGIES     Gluten meal and Penicillins    CURRENT MEDICATIONS       Previous Medications    ASPIRIN (ASPIRIN CHILDRENS) 81 MG CHEWABLE TABLET    Take 1 tablet by mouth daily for 14 days    BENZTROPINE (COGENTIN) 0.5 MG TABLET    TAKE 1 TABLET BY MOUTH TWICE DAILY    BIOTIN 5000 MCG CAPS    Take by mouth    CHOLECALCIFEROL (VITAMIN D3) 2000 UNITS CAPS    Take by mouth daily    CRANBERRY-VITAMIN C-VITAMIN E (CRANBERRY PLUS VITAMIN C PO)    Take by mouth    CYANOCOBALAMIN (B-12) 1000 MCG SUBL    Place under the tongue    FAMOTIDINE (PEPCID) 40 MG TABLET        FOLIC ACID (FOLVITE) 819 MCG TABLET    Take 800 mcg by mouth daily    FOSINOPRIL (MONOPRIL) 20 MG TABLET    TAKE 1 TABLET BY MOUTH EVERY DAY    IBANDRONATE (BONIVA) 150 MG TABLET    SEE NOTES    LEVOTHYROXINE (SYNTHROID) 125 MCG TABLET    TAKE 1 TABLET BY MOUTH EVERY DAY    MULTIPLE VITAMINS-MINERALS (CENTRUM SILVER PO)    Take by mouth    NUTRITIONAL SUPPLEMENTS (OSTEO ADVANCE PO)    Take by mouth    OMEGA-3 FATTY ACIDS (FISH OIL) 1200 MG CAPS    Take by mouth PYRIDOXINE HCL (VITAMIN B-6) 100 MG TABLET    Take 100 mg by mouth daily    VITAMIN E 400 UNIT CAPSULE    Take 400 Units by mouth daily       PAST MEDICAL HISTORY         Diagnosis Date    Anemia     Gastroesophageal reflux disease without esophagitis 2017    Hard of hearing     Hyperlipidemia     Hypertension     Hypothyroidism     TIA (transient ischemic attack)        SURGICAL HISTORY           Procedure Laterality Date    CARPAL TUNNEL RELEASE Right     TONSILLECTOMY      age 16         FAMILY HISTORY           Problem Relation Age of Onset    Cancer Mother         breast    Other Father         ulcers     Family Status   Relation Name Status    Mother      Father          SOCIAL HISTORY      reports that she quit smoking about 52 years ago. She has never used smokeless tobacco. She reports that she does not drink alcohol and does not use drugs. REVIEW OF SYSTEMS    (2-9 systems for level 4, 10 or more for level 5)   Review of Systems   Constitutional:  Positive for fever. Skin:  Positive for color change and wound. All other systems reviewed and are negative. Except as noted above the remainder of the review of systems was reviewed and negative. PHYSICAL EXAM    (up to 7 for level 4, 8 or more for level 5)     ED Triage Vitals   BP Temp Temp src Pulse Resp SpO2 Height Weight   -- -- -- -- -- -- -- --     Physical Exam  Constitutional:       General: She is not in acute distress. Appearance: Normal appearance. She is well-developed, well-groomed and underweight. HENT:      Head: Normocephalic. Comments: He has history of [place]     Right Ear: External ear normal.      Left Ear: External ear normal.      Nose: Nose normal.      Mouth/Throat:      Mouth: Mucous membranes are moist.   Eyes:      Conjunctiva/sclera: Conjunctivae normal.   Cardiovascular:      Rate and Rhythm: Regular rhythm. Tachycardia present.       Heart sounds: Normal heart sounds, S1 normal and S2 normal.   Pulmonary:      Effort: Pulmonary effort is normal.      Breath sounds: Normal breath sounds. Musculoskeletal:         General: Normal range of motion. Cervical back: Normal range of motion and neck supple. Skin:     General: Skin is warm and dry. Capillary Refill: Capillary refill takes less than 2 seconds. Findings: Erythema present. Comments: Patient has a decubitus to the coccyx with some erythema. No drainage. No crepitus. Images of wound placed in media file of chart. Neurological:      Mental Status: She is alert and oriented to person, place, and time.            DIAGNOSTIC RESULTS     EKG: All EKG's are interpreted by the Emergency Department Physician who either signs or Co-signs this chart in the absence of a cardiologist.    RADIOLOGY:   Non-plain film images such as CT, Ultrasound and MRI are read by the radiologist. Plain radiographic images are visualized and preliminarily interpreted by the emergency physician with the below findings:    Interpretation per the Radiologist below, if available at the time of this note:        LABS:  Labs Reviewed   BASIC METABOLIC PANEL - Abnormal; Notable for the following components:       Result Value    Glucose 252 (*)     Bun/Cre Ratio 29 (*)     Sodium 134 (*)     All other components within normal limits   CBC WITH AUTO DIFFERENTIAL - Abnormal; Notable for the following components:    WBC 13.0 (*)     RBC 3.56 (*)     Hemoglobin 10.7 (*)     Hematocrit 34.2 (*)     Seg Neutrophils 84 (*)     Lymphocytes 6 (*)     Eosinophils % 0 (*)     Immature Granulocytes 1 (*)     Segs Absolute 10.81 (*)     Absolute Lymph # 0.82 (*)     Absolute Mono # 1.22 (*)     All other components within normal limits   LACTATE, SEPSIS - Abnormal; Notable for the following components:    Lactic Acid, Sepsis 2.7 (*)     All other components within normal limits   SEDIMENTATION RATE - Abnormal; Notable for the following components:    Sed Rate 82 (*)     All other components within normal limits   C-REACTIVE PROTEIN - Abnormal; Notable for the following components:    CRP 73.1 (*)     All other components within normal limits   CULTURE, BLOOD 1   CULTURE, BLOOD 2   CULTURE, ANAEROBIC AND AEROBIC   LACTATE, SEPSIS       All other labs were within normal range or not returned as of this dictation. EMERGENCY DEPARTMENT COURSE and DIFFERENTIAL DIAGNOSIS/MDM:   Vitals:    Vitals:    02/09/23 1819 02/09/23 1900   BP: (!) 109/44 (!) 116/43   Pulse: (!) 118    Resp: 12    Temp: 99.2 °F (37.3 °C)    TempSrc: Oral    SpO2: 96% 96%   Weight: 128 lb (58.1 kg)    Height: 5' 6\" (1.676 m)          MEDICATIONS GIVEN IN THE ED:  Medications   0.9 % sodium chloride infusion ( IntraVENous New Bag 2/9/23 1412)       CLINICAL DECISION MAKING:  The patient presented alert with a nontoxic appearance and was seen in conjunction with Dr. Melissa Maher    DDx include cellulitis of coccyx, abscess      I will order   Orders Placed This Encounter   Procedures    Blood Culture 1     Standing Status:   Standing     Number of Occurrences:   1    Culture, Blood 2     Standing Status:   Standing     Number of Occurrences:   1    Culture, Anaerobic and Aerobic     Standing Status:   Standing     Number of Occurrences:   1    BMP     Standing Status:   Standing     Number of Occurrences:   1    CBC with Auto Differential     Standing Status:   Standing     Number of Occurrences:   1    Lactate, Sepsis     Standing Status:   Standing     Number of Occurrences:   2    Sedimentation Rate     Standing Status:   Standing     Number of Occurrences:   1    C-Reactive Protein     Standing Status:   Standing     Number of Occurrences:   1    Telemetry monitoring - 72 hour duration     Standing Status:   Standing     Number of Occurrences:   1     Order Specific Question:   Patient may go off unit without monitor     Answer:   Yes     Order Specific Question:   Type:      Answer:   Bedside    Pharmacy to Dose: Vancomycin     Standing Status:   Standing     Number of Occurrences:   1     Order Specific Question:   Antimicrobial Indications     Answer:   Skin and Soft Tissue Infection    Inpatient consult to Internal Medicine     Standing Status:   Standing     Number of Occurrences:   1     Order Specific Question:   Reason for Consult? Answer:   Coccyx wound    Insert peripheral IV     Standing Status:   Standing     Number of Occurrences:   1        The patient was involved in his/her plan of care through shared decision making. The testing that was ordered was discussed with the patient. Any medications that may have been ordered were discussed with the patient. I have reviewed the patient's previous medical records using the electronic health record that we have available. Labs and imaging were reviewed. Imaging was reviewed and reported by the radiologist. Results showed glucose 252. Creatinine 0.80. CRP 73.1. Initial lactic acid 2.7. Sed rate 82. WBC 13.0. Blood cultures obtained. Exam shows coccyx decubitus infection. Cultures of the wound were obtained. Temp 99.2.  /43. Started on vancomycin and [email or web address]. I discussed admission to hospital with the patient. Internal medicine consult-I spoke with nurse practitioner, Je Boyd with [facility] clinic hospitalist group who accepts admission. Care was provided during an unprecedented national emergency due to the novel coronavirus, Covid-19. CONSULTS:  PHARMACY TO DOSE VANCOMYCIN  IP CONSULT TO INTERNAL MEDICINE    PROCEDURES:  Procedures    FINAL IMPRESSION      1.  Wound infection            Problem List  Patient Active Problem List   Diagnosis Code    Mixed hyperlipidemia E78.2    Essential hypertension I10    Hypothyroidism E03.9    Vitamin D deficiency E55.9    Hallucinations, visual R44.1    Vitamin B12 deficiency E53.8    History of gallstones Z87.19    COPD, mild (HCC) J44.9    Basal cell carcinoma of back C44.519 Arthritis of left shoulder region M19.012    Gastroesophageal reflux disease without esophagitis K21.9    Celiac disease K90.0    Duodenitis K29.80    Type 2 diabetes mellitus without complication, without long-term current use of insulin (HCC) E11.9    TIA (transient ischemic attack) G45.9    Dysarthria R47.1         DISPOSITION/PLAN   DISPOSITION Decision To Admit 02/09/2023 07:14:50 PM      PATIENT REFERRED TO:   No follow-up provider specified.    DISCHARGE MEDICATIONS:     New Prescriptions    No medications on file           (Please note that portions of this note were completed with a voice recognition program.  Efforts were made to edit the dictations but occasionally words are mis-transcribed.)    MUMTAZ Yarbrough - MUMTAZ Rizzo CNP  02/09/23 1930

## 2023-02-09 NOTE — ED PROVIDER NOTES
eMERGENCY dEPARTMENT eNCOUnter   [address] Name: Cara Aldana  MRN: [id number]  [place] 1935  Date of evaluation: 2/9/23     Cara Aldana is a 81 y.o. female with CC: Wound Infection (Open sore to coccyx )      This visit was performed by both a physician and an APC. I performed all aspects of the MDM as documented. Based on the medical record the care appears appropriate. I was personally available for consultation in the Emergency Department.     The care is provided during an unprecedented national emergency due to the novel coronavirus, Ashanti Gillespie MD  Attending Emergency Physician                  Tee Harrell MD  02/09/23 [id number]

## 2023-02-09 NOTE — ED NOTES
Pt to er via ems from [facility] for evaluation. Pt was seen per wound care today at facility and was told to come to ed for further care. Pt has large open area with odor to coccyx. Phots taken. Cultures obtained per RYANNE Spann np. Skin warm and dry. resp non-labored.       Loida Valenzuela RN  02/09/23 2838

## 2023-02-10 LAB
ABSOLUTE EOS #: 0.1 K/UL (ref 0–0.44)
ABSOLUTE IMMATURE GRANULOCYTE: 0.05 K/UL (ref 0–0.3)
ABSOLUTE LYMPH #: 1.06 K/UL (ref 1.1–3.7)
ABSOLUTE MONO #: 1.11 K/UL (ref 0.1–1.2)
ANION GAP SERPL CALCULATED.3IONS-SCNC: 7 MMOL/L (ref 9–17)
BASOPHILS # BLD: 1 % (ref 0–2)
BASOPHILS ABSOLUTE: 0.04 K/UL (ref 0–0.2)
BUN SERPL-MCNC: 19 MG/DL (ref 8–23)
BUN/CREAT BLD: 30 (ref 9–20)
CALCIUM SERPL-MCNC: 8.9 MG/DL (ref 8.6–10.4)
CHLORIDE SERPL-SCNC: 109 MMOL/L (ref 98–107)
CO2 SERPL-SCNC: 22 MMOL/L (ref 20–31)
CREAT SERPL-MCNC: 0.64 MG/DL (ref 0.5–0.9)
EOSINOPHILS RELATIVE PERCENT: 1 % (ref 1–4)
GFR SERPL CREATININE-BSD FRML MDRD: >60 ML/MIN/1.73M2
GLUCOSE SERPL-MCNC: 144 MG/DL (ref 70–99)
HCT VFR BLD AUTO: 30.5 % (ref 36.3–47.1)
HGB BLD-MCNC: 9.5 G/DL (ref 11.9–15.1)
IMMATURE GRANULOCYTES: 1 %
INR PPP: 1.2
LYMPHOCYTES # BLD: 12 % (ref 24–43)
MCH RBC QN AUTO: 29.8 PG (ref 25.2–33.5)
MCHC RBC AUTO-ENTMCNC: 31.1 G/DL (ref 28.4–34.8)
MCV RBC AUTO: 95.6 FL (ref 82.6–102.9)
MONOCYTES # BLD: 13 % (ref 3–12)
NRBC AUTOMATED: 0 PER 100 WBC
PDW BLD-RTO: 12.8 % (ref 11.8–14.4)
PLATELET # BLD AUTO: 234 K/UL (ref 138–453)
PMV BLD AUTO: 9.3 FL (ref 8.1–13.5)
POTASSIUM SERPL-SCNC: 4 MMOL/L (ref 3.7–5.3)
PROTHROMBIN TIME: 15 SEC (ref 11.5–14.2)
RBC # BLD: 3.19 M/UL (ref 3.95–5.11)
SEG NEUTROPHILS: 72 % (ref 36–65)
SEGMENTED NEUTROPHILS ABSOLUTE COUNT: 6.43 K/UL (ref 1.5–8.1)
SODIUM SERPL-SCNC: 138 MMOL/L (ref 135–144)
WBC # BLD AUTO: 8.8 K/UL (ref 3.5–11.3)

## 2023-02-10 PROCEDURE — 2580000003 HC RX 258: Performed by: NURSE PRACTITIONER

## 2023-02-10 PROCEDURE — 97530 THERAPEUTIC ACTIVITIES: CPT

## 2023-02-10 PROCEDURE — 1200000000 HC SEMI PRIVATE

## 2023-02-10 PROCEDURE — 6370000000 HC RX 637 (ALT 250 FOR IP): Performed by: HOSPITALIST

## 2023-02-10 PROCEDURE — 85025 COMPLETE CBC W/AUTO DIFF WBC: CPT

## 2023-02-10 PROCEDURE — 2580000003 HC RX 258: Performed by: INTERNAL MEDICINE

## 2023-02-10 PROCEDURE — 97112 NEUROMUSCULAR REEDUCATION: CPT

## 2023-02-10 PROCEDURE — 6370000000 HC RX 637 (ALT 250 FOR IP): Performed by: NURSE PRACTITIONER

## 2023-02-10 PROCEDURE — 99222 1ST HOSP IP/OBS MODERATE 55: CPT | Performed by: INTERNAL MEDICINE

## 2023-02-10 PROCEDURE — 6370000000 HC RX 637 (ALT 250 FOR IP): Performed by: INTERNAL MEDICINE

## 2023-02-10 PROCEDURE — 36415 COLL VENOUS BLD VENIPUNCTURE: CPT

## 2023-02-10 PROCEDURE — 97163 PT EVAL HIGH COMPLEX 45 MIN: CPT

## 2023-02-10 PROCEDURE — 97110 THERAPEUTIC EXERCISES: CPT

## 2023-02-10 PROCEDURE — 85610 PROTHROMBIN TIME: CPT

## 2023-02-10 PROCEDURE — 80048 BASIC METABOLIC PNL TOTAL CA: CPT

## 2023-02-10 PROCEDURE — 6360000002 HC RX W HCPCS: Performed by: NURSE PRACTITIONER

## 2023-02-10 PROCEDURE — 6360000002 HC RX W HCPCS: Performed by: INTERNAL MEDICINE

## 2023-02-10 RX ORDER — OXYCODONE HYDROCHLORIDE 5 MG/1
2.5 TABLET ORAL EVERY 8 HOURS PRN
Status: DISCONTINUED | OUTPATIENT
Start: 2023-02-10 | End: 2023-02-17 | Stop reason: HOSPADM

## 2023-02-10 RX ORDER — SODIUM HYPOCHLORITE 1.25 MG/ML
SOLUTION TOPICAL 2 TIMES DAILY
Status: DISCONTINUED | OUTPATIENT
Start: 2023-02-10 | End: 2023-02-17 | Stop reason: HOSPADM

## 2023-02-10 RX ORDER — LEVOTHYROXINE SODIUM 112 UG/1
112 TABLET ORAL
COMMUNITY

## 2023-02-10 RX ORDER — MAGNESIUM CHLORIDE 71.5 G/G
1 TABLET ORAL DAILY
COMMUNITY

## 2023-02-10 RX ORDER — METRONIDAZOLE 500 MG/1
500 TABLET ORAL EVERY 8 HOURS SCHEDULED
Status: DISCONTINUED | OUTPATIENT
Start: 2023-02-10 | End: 2023-02-16

## 2023-02-10 RX ORDER — LEVOTHYROXINE SODIUM 112 UG/1
112 TABLET ORAL
Status: DISCONTINUED | OUTPATIENT
Start: 2023-02-11 | End: 2023-02-17 | Stop reason: HOSPADM

## 2023-02-10 RX ORDER — LISINOPRIL 20 MG/1
20 TABLET ORAL DAILY
Status: DISCONTINUED | OUTPATIENT
Start: 2023-02-10 | End: 2023-02-17 | Stop reason: HOSPADM

## 2023-02-10 RX ORDER — ACETAMINOPHEN 500 MG
500 TABLET ORAL EVERY 6 HOURS PRN
COMMUNITY

## 2023-02-10 RX ORDER — OXYCODONE HYDROCHLORIDE 5 MG/1
2.5 TABLET ORAL EVERY 8 HOURS PRN
Status: ON HOLD | COMMUNITY
End: 2023-02-16 | Stop reason: SDUPTHER

## 2023-02-10 RX ADMIN — DAKIN'S SOLUTION 0.125% (QUARTER STRENGTH): 0.12 SOLUTION at 14:28

## 2023-02-10 RX ADMIN — METRONIDAZOLE 500 MG: 500 TABLET ORAL at 21:42

## 2023-02-10 RX ADMIN — ENOXAPARIN SODIUM 40 MG: 100 INJECTION SUBCUTANEOUS at 11:17

## 2023-02-10 RX ADMIN — VANCOMYCIN HYDROCHLORIDE 1250 MG: 5 INJECTION, POWDER, LYOPHILIZED, FOR SOLUTION INTRAVENOUS at 21:42

## 2023-02-10 RX ADMIN — CEFEPIME 2000 MG: 2 INJECTION, POWDER, FOR SOLUTION INTRAVENOUS at 11:23

## 2023-02-10 RX ADMIN — DAKIN'S SOLUTION 0.125% (QUARTER STRENGTH): 0.12 SOLUTION at 21:42

## 2023-02-10 RX ADMIN — LISINOPRIL 20 MG: 20 TABLET ORAL at 14:28

## 2023-02-10 RX ADMIN — FAMOTIDINE 20 MG: 20 TABLET, FILM COATED ORAL at 11:18

## 2023-02-10 RX ADMIN — METRONIDAZOLE 500 MG: 500 TABLET ORAL at 14:28

## 2023-02-10 NOTE — PROGRESS NOTES
Physical Therapy  Facility/Department: [facility] MED SURG  Physical Therapy Initial Assessment    Name: Velma Rogers  : 1935  MRN: [id number]  Date of Service: 2/10/2023    Discharge Recommendations:  Patient would benefit from continued therapy after discharge, 24 hour supervision or assist    Pt presented to ED on 23 via EMS for evaluation of infected coccyx wound. Pt resides at [facility]. EMS reported wound care nurse at facility was concerned of infection to the patient's coccyx wound. Patient not currently on antibiotics. Patient has history of dementia, hard of hearing, hypertension, hyperlipidemia, anemia, and GERD. She has mild pain to the coccyx area. Patient Diagnosis(es): The encounter diagnosis was Wound infection. Past Medical History:  has a past medical history of Anemia, Gastroesophageal reflux disease without esophagitis, Hard of hearing, Hyperlipidemia, Hypertension, Hypothyroidism, and TIA (transient ischemic attack). Past Surgical History:  has a past surgical history that includes Tonsillectomy and Carpal tunnel release (Right). Assessment   Body Structures, Functions, Activity Limitations Requiring Skilled Therapeutic Intervention: Decreased functional mobility ; Decreased strength;Decreased safe awareness;Decreased cognition;Decreased posture;Decreased balance;Decreased endurance  Assessment: Pt with MAX deficits of bed mobility, impaired balance, and endurance this session,  Pt not safe to even attempt transfer OOB even with [place] due to POOR trunk control with sitting balance at EOB. With current deficits, Pt HIGH risk for falls & requires continued PT to maximize independence with functional mobility, balance, safety awareness & activity tolerance. Pt currently functioning with AM-PAC mobility score of 8 but appears to be near baseline.   Due to recent hospitalization and medical condition, pt would benefit from additional intermittent skilled therapy at time of discharge, will need 24 hr supervision at D/C to ensure safety  Therapy Prognosis: Fair  Decision Making: High Complexity  Exam: ROM, MMT, functional mobility, activity tolerance, Balance, & [employer] AM-PAC 6 Clicks Basic Mobility  Clinical Presentation: evolving  Requires PT Follow-Up: Yes  Activity Tolerance  Activity Tolerance: Patient limited by fatigue;Treatment limited secondary to decreased cognition     Plan   Physcial Therapy Plan  General Plan: 3-5 times per week  Current Treatment Recommendations: Strengthening, ROM, Balance training, Functional mobility training, Endurance training, Cognitive reorientation, Safety education & training, Patient/Caregiver education & training, Positioning  Safety Devices  Type of Devices: All roc prominences offloaded, Call light within reach, Bed alarm in place, Gait belt, Heels elevated for pressure relief, Patient at risk for falls, Left in bed, Nurse notified     Restrictions  Restrictions/Precautions  Restrictions/Precautions: General Precautions, Fall Risk  Position Activity Restriction  Other position/activity restrictions:  Up with assist, telemetry, Heels off bed at all times, LUE IV, ext urinary catheter, VERY [place], h/o dementia per chart, ALARMS     Subjective   General  Chart Reviewed: Yes  Patient assessed for rehabilitation services?: Yes  Additional Pertinent Hx: VERY [place], dementia, hard of hearing, hypertension, hyperlipidemia, anemia,, GERD, COVID  Response To Previous Treatment: Not applicable  General Comment  Comments: RN okays PT  Subjective  Subjective: Pt agreeable to PT, denies pain at rest but c/o generalized pain with mobility         Social/Functional History  Social/Functional History  Lives With: Alone  Type of Home: Facility  Home Layout: One level  Home Access: Level entry  Bathroom Equipment:  (unknown since Pt is poor historian)  Receives Help From: Personal care attendant (staff at [facility])  ADL Assistance: Needs assistance  Toileting: Needs assistance  Homemaking Assistance: Needs assistance  Ambulation Assistance: Non-ambulatory (per RN, pt typically only stand/pivots from chair to bed)  Transfer Assistance: Needs assistance  Occupation: Retired  Type of Occupation: pt could not recall  [address]: pt did not state  IADL Comments: Question reliability of info pt reported as she is a poor historian, will need verified for accuracy  Vision/Hearing  Vision  Vision: Impaired  Vision Exceptions: Wears glasses for reading;Wears glasses for distance  Hearing  Hearing: Exceptions to [facility]  Hearing Exceptions: Hard of hearing/hearing concerns;Bilateral hearing aid (Pt stated her aides are not here)    Cognition   Orientation  Orientation Level: Oriented to person;Disoriented to place; Disoriented to time;Disoriented to situation (pt did know her birthdate)  Cognition  Overall Cognitive Status: Exceptions  Arousal/Alertness: Delayed responses to stimuli  Following Commands: Follows one step commands with repetition; Follows one step commands with increased time; Inconsistently follows commands  Attention Span: Difficulty attending to directions  Memory: Decreased recall of recent events;Decreased recall of biographical Information  Safety Judgement: Decreased awareness of need for assistance;Decreased awareness of need for safety  Problem Solving: Decreased awareness of errors;Assistance required to identify errors made;Assistance required to correct errors made  Insights: Decreased awareness of deficits  Initiation: Requires cues for all  Sequencing: Requires cues for all     Objective   Heart Rate: (!) 109  Heart Rate Source: Monitor  BP: (!) 159/64  BP Location: Right upper arm  Patient Position: Supine  MAP (Calculated): 96  Resp: 14  SpO2: 97 %  O2 Device: None (Room air)     Observation/Palpation  Posture: Poor  Observation: ext urinary catheter, LUE IV; pt very thin with fragile skin; has wound at coccyx covered by dressing  Gross Assessment  Sensation: Intact (pt denies paresthesias)     AROM RLE (degrees)  RLE General AROM: only assisted hip/knee & lacks at least 15-20 degtrees of full extension  AROM LLE (degrees)  LLE General AROM: only assisted hip/knee & lacks at least 15-20 degtrees of full extension  AROM RUE (degrees)  RUE AROM : WFL  AROM LUE (degrees)  LUE AROM : WFL  Strength RLE  Comment: 2/5 hip/knee, 3+/5 ankle  Strength LLE  Comment: 2/5 hip/knee, 3+/5 ankle  Strength RUE  Comment: 4/5  Strength LUE  Comment: 4/5           Bed mobility  Rolling to Left: Maximum assistance;2 Person assistance  Rolling to Right: Maximum assistance;2 Person assistance  Supine to Sit: Maximum assistance;2 Person assistance  Sit to Supine: Maximum assistance;2 Person assistance  Scooting: Maximal assistance;2 Person assistance  Bed Mobility Comments: Max verbal instruction/tactile assist to initiiate ALL movements,  for UE hand placement on rail and proper log rolling tech + MAX support of LE's to come in/OOB , use of UB to scoot completely out to EOB with B foot placement to establish safe sitting balance, pursed lip breathing, pacing, encouragement, awareness/assist with line mgt,  with increased time needed all to increase safety & reduce fall risk  After Pt returned to lying, completed rolling x 3 to reposition to midline & scoot to [facility] with Max2 assist & bed position in [place] down to assist scoot with use of slide sheet to reduce skin shear & breakdown  Transfers  Sit to Stand: Unable to assess  Comment: could not even attempt even with [place] due to POOR trunk control        Balance  Sitting - Static: Fair;-  Sitting - Dynamic: Poor  Standing - Static:  (dependent)  Standing - Dynamic:  (dependent)  Comments: Pt demonstrated posterior LOB upon sitting to EOB, needed MAX cues & MaxA to bring wt forward to establish safe COG/maintain midline seated EOB with [place] foot placement  Pressure Relief Exercises: assisted WS seated & supine x 5  Static Sitting Balance Exercises: seated EOB with [place] foot placement x 10 minutes with MaxA  Dynamic Sitting Balance Exercises: Pt completed seated ant/post & lateral WS seated EOB to facilitate core strength & stability  Breathing Techniques: pursed lip breathing techniques    Pt is at risk for skin breakdown. Pt educated on pressure relief measures. Pt positioned appropriately after treatment with all high risk areas elevated or propped. Air mattress is inflated to appropriate level. All lines intact, call light within reach, and Pt reports being comfortable at end of treatment. All patient needs addressed prior to ending therapy session. OutComes Score                                                  AM-PAC Score  AM-PAC Inpatient Mobility Raw Score : 8 (02/10/23 1405)  AM-PAC Inpatient T-Scale Score : 28.52 (02/10/23 1405)  Mobility Inpatient CMS 0-100% Score: 86.62 (02/10/23 1405)  Mobility Inpatient CMS G-Code Modifier : CM (02/10/23 1405)          Tinneti Score       Goals  Short Term Goals  Time Frame for Short Term Goals: 12 visits  Short Term Goal 1: Inc bed mobility to [employer]  Short Term Goal 2: Pt able to transfer from bed to chair with MOD assist using safest device  Short Term Goal 3: Pt to tolerate sitting EOB up to 15-20 minutes with UB/LB support to improve core stability & repositioning for pressure relief & prevent sedentary complications  Short Term Goal 4: Pt able to tolerate 30 min of activity to include ex, NMR & functional mobility with device to facilitate better activity tolerance and prevent further sedentary complications       Education  Patient Education  Education Given To: Patient  Education Provided: Role of Therapy;Plan of Care; Fall Prevention Strategies;Transfer Training;Orientation  Education Provided Comments: Pt educated on purpose of acute PT eval, importance of continued mobility throughout admission, general safety awareness, fall risk prevention, safe functional mobility, circulation ex's, pressure relief & breathing techniques, prevention of sedentary complications, and PT POC. Pt demonstrated POOR carryover  Pt requires continued reinforcement of education.   Education Method: Demonstration;Verbal  Barriers to Learning: Cognition  Education Outcome: Continued education needed      Therapy Time   Individual Concurrent Group Co-treatment   Time In 1325         Time Out 1414         Minutes 49          Additional 10 minutes for chart review    Treatment time: 40 minutes         [address], PT

## 2023-02-10 NOTE — PROGRESS NOTES
Wound Ostomy Continence Nursing      NAME:  [address] [place] RECORD NUMBER:  [id number]  AGE: 81 y.o. GENDER: female  : 1935  TODAY'S DATE:  2/10/2023      Case discussed with RN. Dakin's gauze dressing has been ordered and general surgery consulted on the case. Will defer eval visit at this time and follow up Monday for wound mgmt needs.      Jody PANDYA NPDarleneC, CWS, [facility] [address] [place] [address]  Wound, Ostomy, and Continence Nursing  [phone]

## 2023-02-10 NOTE — PROGRESS NOTES
Transitions of Care Pharmacy Service   Medication Review    The patient's list of current home medications has been reviewed. Source(s) of information: List from [facility] on information provided by the above source(s), I have updated the patient's home med list as described below. Please review the ACTION REQUESTED section of this note below for any discrepancies on current hospital orders. I changed or updated the following medications on the patient's home medication list:  Removed ASA 81mg daily  Biotin 5000 mcg  Vitamin D 2000 units  B12 1000 mcg  Pepcid 40 mg  Folic acid [id number]  Boniva 150mg  Osteo Advance  Fish Oil  Pyridoxine  Vitamin E 400 units     Added Protonix 40mg PO Qam  Saline nasal prn congestion  Santyl ointment daily to coccyx wound  Slow Mag 1 PO daily  Tylenol 500mg Po q6hrs prn  Oxycodone 5mg - 1.2 tab (2.5mg) Po q8hrs prn     Adjusted   Levothyroxine from 125mcg to 112 mcg PO Qam   Other Notes none         PROVIDER ACTION REQUESTED  Medications that need to be addressed by a physician/nurse practitioner:    Medication Action Requested        Home med list is ready for review. Please feel free to call me with any questions about this encounter. Thank you. Ralf Quinonez, [facility]   Transitions of Care Pharmacy Service  Phone:  [phone]  Fax: [phone]      Electronically signed by Ralf Quinonez [facility] on 2/10/2023 at 11:11 AM           Medications Prior to Admission: levothyroxine (SYNTHROID) 112 MCG tablet, Take 112 mcg by mouth every morning (before breakfast)  sodium chloride (OCEAN, BABY AYR) 0.65 % nasal spray, 1 spray by Nasal route as needed for Congestion  magnesium cl-calcium carbonate (SLOW-MAG) 71.5-119 MG TBEC tablet, Take 1 tablet by mouth daily  oxyCODONE (ROXICODONE) 5 MG immediate release tablet, Take 2.5 mg by mouth every 8 hours as needed for Pain.   collagenase 250 UNIT/GM ointment, Apply topically daily TO COCCYX WOUND.  acetaminophen (TYLENOL) 500 MG tablet, Take 500 mg by mouth every 6 hours as needed for Pain or Fever  fosinopril (MONOPRIL) 20 MG tablet, TAKE 1 TABLET BY MOUTH EVERY DAY (Patient taking differently: Take 20 mg by mouth daily TAKE 1 TABLET BY MOUTH EVERY DAY)  Multiple Vitamins-Minerals (CENTRUM SILVER PO), Take 1 tablet by mouth daily (before dinner)

## 2023-02-10 NOTE — CONSULTS
[facility] Pharmacokinetic Monitoring Service - Vancomycin     Julieth Gonzalez is a 81 y.o. female starting on vancomycin therapy for SSTI. Pharmacy consulted by Crystal Isaac for monitoring and adjustment. Target Concentration: Goal trough of 10-15 mg/L and AUC/JANET <500 mg*hr/L    Additional Antimicrobials: none    Pertinent Laboratory Values: Wt Readings from Last 1 Encounters:   02/09/23 128 lb (58.1 kg)     Temp Readings from Last 1 Encounters:   02/09/23 97.7 °F (36.5 °C) (Axillary)     Estimated Creatinine Clearance: 46 mL/min (based on SCr of 0.8 mg/dL). Recent Labs     02/09/23  1825   CREATININE 0.80   WBC 13.0*       MRSA Nasal Swab: N/A. Non-respiratory infection.     Plan:  Dosing recommendations based on Bayesian software  Start vancomycin 1500mg once followed by 1000mg q24h  Anticipated AUC of 429 and trough concentration of 12.6 at steady state  Renal labs as indicated   Vancomycin concentration ordered for 2/11 @ 0600   Pharmacy will continue to monitor patient and adjust therapy as indicated    Thank you for the consult,  Simi Ortiz, [facility]  2/9/2023 9:19 PM

## 2023-02-10 NOTE — PLAN OF CARE
Problem: Discharge Planning  Goal: Discharge to home or other facility with appropriate resources  2/10/2023 1519 by Romana Salk, RN  Outcome: Progressing  2/10/2023 1519 by Romana Salk, RN  Outcome: Progressing  2/10/2023 0402 by Dharmesh Martinez RN  Outcome: Progressing

## 2023-02-10 NOTE — PROGRESS NOTES
[address] [facility] Pharmacokinetic Monitoring Service - Vancomycin     Brie Goodrich is a 81 y.o. female starting on vancomycin therapy for SSTI. Pharmacy consulted by Kelly Negron for monitoring and adjustment. Day of therapy: 2      Target Concentration: Goal trough of 10-15 mg/L and AUC/JANET <500 mg*hr/L    Additional Antimicrobials: none    Pertinent Laboratory Values: Wt Readings from Last 1 Encounters:   02/09/23 128 lb (58.1 kg)     Temp Readings from Last 1 Encounters:   02/09/23 97.7 °F (36.5 °C) (Axillary)     Estimated Creatinine Clearance: 46 mL/min (based on SCr of 0.8 mg/dL). Recent Labs     02/09/23  1825 02/10/23  0534   CREATININE 0.80 0.64   WBC 13.0* 8.8       MRSA Nasal Swab: N/A. Non-respiratory infection. Plan:  Dosing recommendations based on Bayesian software. Renal function improved.   Start vancomycin 1500mg once followed by 1250mg q24h  Anticipated AUC of 499 and trough concentration of 13.9 at steady state  Renal labs as indicated   Vancomycin concentration ordered for 2/11 @ 0600   Pharmacy will continue to monitor patient and adjust therapy as indicated    Thank you for the consult,  CHARIS Hargrove [facility]  2/10/2023 7:26 AM

## 2023-02-10 NOTE — CONSULTS
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 History of Present Illness:   Magdy Magaña is a 81y.o.-year-old female who was initially admitted on 2/9/2023. Kera Márquez has history of dementia, hard of hearing, hypertension, hyperlipidemia, anemia, and GERD. She resides at [address] and it is my understanding that in December 2022 she had COVID-19 virus infection and since that time has not really recovered in terms of her ADLs and being herself. She has been more sedentary and it is my understanding that a few weeks ago she was found to have a skin tear by her gluteal area. This has subsequently progressed and when the patient was seen by the wound care nurse there was concern for infection and the patient was sent into the emergency department for further evaluation. The patient has been admitted for an infected gluteal ulceration and I was asked to evaluate and help with antibiotic choice. The patient at this point in time is nonverbal, not really able to give me any history and the history it obtained from reviewing the chart and discussing the care with the nurse.     I have personally reviewed the past medical history, past surgical history, medications, social history, and family history, and I have updated the database accordingly. Past Medical History:     Past Medical History:   Diagnosis Date    Anemia     Gastroesophageal reflux disease without esophagitis 2017    Hard of hearing     Hyperlipidemia     Hypertension     Hypothyroidism     TIA (transient ischemic attack)      Past Surgical  History:     Past Surgical History:   Procedure Laterality Date    CARPAL TUNNEL RELEASE Right     TONSILLECTOMY      age 16     Medications:      vancomycin  1,250 mg IntraVENous Q24H    sodium chloride flush  5-40 mL IntraVENous 2 times per day    enoxaparin  40 mg SubCUTAneous Daily    famotidine  20 mg Oral Daily    vancomycin (VANCOCIN) intermittent dosing (placeholder)   Other RX Placeholder     Social History:     Social History     Socioeconomic History    Marital status:       Spouse name: Not on file    Number of children: Not on file    Years of education: Not on file    Highest education level: Not on file   Occupational History    Not on file   Tobacco Use    Smoking status: Former     Types: Cigarettes     Quit date: 1970     Years since quittin.6    Smokeless tobacco: Never   Substance and Sexual Activity    Alcohol use: No     Alcohol/week: 0.0 standard drinks    Drug use: No    Sexual activity: Not Currently   Other Topics Concern    Not on file   Social History Narrative    Not on file     Social Determinants of Health     Financial Resource Strain: Not on file   Food Insecurity: Not on file   Transportation Needs: Not on file   Physical Activity: Not on file   Stress: Not on file   Social Connections: Not on file   Intimate Partner Violence: Not on file   Housing Stability: Not on file     Family History:     Family History   Problem Relation Age of Onset    Cancer Mother         breast    Other Father         ulcers      Allergies:   Gluten meal and Penicillins     Review of Systems:   Unable to obtain    Physical Examination :   BP (!) 143/59   Pulse (!) 110   Temp 99.1 °F (37.3 °C) (Oral)   Resp 18   Ht 5' 6\" (1.676 m)   Wt 126 lb 4.8 oz (57.3 kg)   SpO2 95%   BMI 20.39 kg/m²     Temperature Range: Temp: 99.1 °F (37.3 °C) Temp  Av.5 °F (36.9 °C)  Min: 97.7 °F (36.5 °C)  Max: 99.2 °F (37.3 °C)  General Appearance: The patient is frail-appearing, cachectic, nonverbal  Head: Normocephalic, without obvious abnormality, atraumatic, and is tilted to the right side and resists range of motion at the neck-turning  Eyes: pupils equal, round, and reactive to light  ENT: The oropharynx was fully evaluated and the lips are dry  Neck: The patient does have a contracture with the face turned towards the right and resisted range of motion  Pulmonary/Chest: Few scattered rales appreciated  Cardiovascular: Regular rate and rhythm without murmurs, rubs, or gallops. Abdomen: Soft, nontender, nondistended. Extremities: No cyanosis, clubbing, edema, or effusions. Neurologic: The patient does moan during the evaluation but does not follow any commands  Skin: There is a stage IV necrotic ulceration in the coccyx, left gluteal cheek      Medical Decision Making:   I have independently reviewed/ordered the following labs:  CBC with Differential:   Recent Labs     02/09/23  1825 02/10/23  0534   WBC 13.0* 8.8   HGB 10.7* 9.5*   HCT 34.2* 30.5*    234   LYMPHOPCT 6* 12*   MONOPCT 9 13*     BMP:   Recent Labs     23  1825 02/10/23  0534   * 138   K 4.2 4.0    109*   CO2 20 22   BUN 23 19   CREATININE 0.80 0.64     Hepatic Function Panel: No results for input(s): PROT, LABALBU, BILIDIR, IBILI, BILITOT, ALKPHOS, ALT, AST in the last 72 hours.     No results found for: PROCAL    Lab Results   Component Value Date/Time    CRP 73.1 2023 06:25 PM     No results found for: FERRITIN  No results found for: FIBRINOGEN  No results found for: DDIMER  No results found for: LDH    Lab Results   Component Value Date    [facility] 82 (H) 2023       No results found for: COVID19  No results found for requested labs within last 30 days.       Imaging Studies:   No results found.    Cultures:     Culture, Anaerobic and Aerobic [[id number]] (Abnormal) Collected: 02/09/23 1809   Order Status: Completed Specimen: Coccyx Updated: 02/09/23 2350    Specimen Description .COCCYX    Direct Exam FEW NEUTROPHILS Abnormal      MIXED BACTERIAL MORPHOTYPES SEEN ON GRAM STAIN. Abnormal     Culture PENDING   Culture, Blood 2 [[id number]] Collected: 02/09/23 1820   Order Status: Completed Specimen: Blood Updated: 02/09/23 2139    Specimen Description .BLOOD    Special Requests RFA 12ML    Culture NO GROWTH <24 HRS   Blood Culture 1 [[id number]] Collected: 02/09/23 1805   Order Status: Completed Specimen: Blood Updated: 02/09/23 2139    Specimen Description .BLOOD    Special Requests RAR 12ML    Culture NO GROWTH <24 HRS       Electronically signed by Fredi Ponce MD on 2/10/2023 at 8:57 AM      [facility]  Fredi Ponce MD  [employer] messaging  OFFICE: [phone]    Thank you for allowing us to participate in the care of this patient. Please call with questions.     This note is created with the assistance of a speech recognition program.  While intending to generate a document that actually reflects the content of the visit, the document can still have some errors including those of syntax and sound a like substitutions which may escape proof reading.  In such instances, actual meaning can be extrapolated by contextual diversion.

## 2023-02-10 NOTE — ED NOTES
Attempted to call report, [facility] , RN not available to take report at this time. RN will call back.        Katie Shore RN  02/09/23 3411

## 2023-02-10 NOTE — CARE COORDINATION
Pt from [facility] in [facility]. Admitted for cellulitis coccyx wound. Spoke to staff and pt has had a decline since she had Alfredoprabhakar in December. Had been getting around with walker prior to [place]. Staff noticed a skin tear on coccyx 1-12 and  informed MD and pt was not seen by CNP until 2-6. Wound nurse saw pt yesterday at [facility]. Pt is followed by Galina Telles Provided PCP - [phone]. Pt gets showers every other day, med passes and assistance with meals. Staff at [facility] will contact Galian Telles Provided PCP when pt returns to [place] for any services that may be needed. ID consulted  and currently on IV Vanco.    VM left with son Bong Marie [phone] to discuss code status. According to [facility] staff he has not been very involved with pt. Will need transportation back to [place] at D/C. Continue to follow.

## 2023-02-10 NOTE — PLAN OF CARE
Problem: Discharge Planning  Goal: Discharge to home or other facility with appropriate resources  2/10/2023 1519 by Martha Humphrey RN  Outcome: Progressing  2/10/2023 0402 by Tobias Willis RN  Outcome: Progressing     Problem: Skin/Tissue Integrity  Goal: Absence of new skin breakdown  Description: 1. Monitor for areas of redness and/or skin breakdown  2. Assess vascular access sites hourly  3. Every 4-6 hours minimum:  Change oxygen saturation probe site  4. Every 4-6 hours:  If on nasal continuous positive airway pressure, respiratory therapy assess nares and determine need for appliance change or resting period.   2/10/2023 0402 by Tobais Willis RN  Outcome: Progressing

## 2023-02-10 NOTE — H&P
History & Physical  [facility]    Adult Hospitalist      Name: Pati Mccarthy  MRN: [id number]     Acct: [de-identified]  Room: 2002/2002-02    Admit Date: 2/9/2023  5:54 PM  PCP: Ingrid Mendoza    Primary Problem  Principal Problem:    Cellulitis  Resolved Problems:    * No resolved hospital problems. *        Assesment:       Decubitus coccygeal ulcer -unstageable  Leukocytosis  Hypertension   Hyperlipidemia   History of TIA  Advanced dementia        Plan:      Admitted to [facility] surge telemetry  O2 maintain oxygen saturation greater than 92%     Lactate and procalcitonin  Blood culture  Wound culture   IV vancomycin and cefepime   Id consult   General surgery consult    Continue to monitor/telemetry/CBC with differential daily/BMP daily  DVT and GI prophylaxis.   Continue medications as below      Scheduled Meds:   vancomycin  1,250 mg IntraVENous Q24H    metroNIDAZOLE  500 mg Oral 3 times per day    sodium hypochlorite   Irrigation BID    [START ON 2/11/2023] cefepime  2,000 mg IntraVENous Q24H    lisinopril  20 mg Oral Daily    [START ON 2/11/2023] levothyroxine  112 mcg Oral QAM AC    sodium chloride flush  5-40 mL IntraVENous 2 times per day    enoxaparin  40 mg SubCUTAneous Daily    famotidine  20 mg Oral Daily    vancomycin (VANCOCIN) intermittent dosing (placeholder)   Other RX Placeholder     Continuous Infusions:   sodium chloride 75 mL/hr at 02/09/23 1838    sodium chloride       PRN Meds:  oxyCODONE, 2.5 mg, Q8H PRN  sodium chloride flush, 10 mL, PRN  sodium chloride, , PRN  potassium chloride, 40 mEq, PRN   Or  potassium alternative oral replacement, 40 mEq, PRN   Or  potassium chloride, 10 mEq, PRN  magnesium sulfate, 1,000 mg, PRN  ondansetron, 4 mg, Q8H PRN   Or  ondansetron, 4 mg, Q6H PRN  magnesium hydroxide, 30 mL, Daily PRN  acetaminophen, 650 mg, Q6H PRN   Or  acetaminophen, 650 mg, Q6H PRN      Chief Complaint:     Chief Complaint   Patient presents with    Wound Infection     Open sore to coccyx          History of Present Illness:      Tracie Mayo is a 81 y.o.  female who presents with Wound Infection (Open sore to coccyx )    36-year-old lady presented to ER complaining of wound infection on her coccyx. She presented from extended care facility. She denies any fever, chills, cough, cold, changes urination, bowel habit or rash. Past medical history significant for dementia, hearing impairment, hypertension, hyperlipidemia, GERD. Patient is complaining of some pain at her coccygeal area. Sodium was 134. WBC was 13. I have personally reviewed the past medical history, past surgical history, medications, social history, and family history, and summarized in the note. Review of Systems:     All 10 point system is reviewed and negative otherwise mentioned in HPI. Past Medical History:     Past Medical History:   Diagnosis Date    Anemia     Gastroesophageal reflux disease without esophagitis 12/20/2017    Hard of hearing     Hyperlipidemia     Hypertension     Hypothyroidism     TIA (transient ischemic attack)         Past Surgical History:     Past Surgical History:   Procedure Laterality Date    CARPAL TUNNEL RELEASE Right     TONSILLECTOMY      age 16        Medications Prior to Admission:       Prior to Admission medications    Medication Sig Start Date End Date Taking? Authorizing Provider   levothyroxine (SYNTHROID) 112 MCG tablet Take 112 mcg by mouth every morning (before breakfast)   Yes Historical Provider, MD   sodium chloride (OCEAN, BABY AYR) 0.65 % nasal spray 1 spray by Nasal route as needed for Congestion   Yes Historical Provider, MD   magnesium cl-calcium carbonate (SLOW-MAG) 71.5-119 MG TBEC tablet Take 1 tablet by mouth daily   Yes Historical Provider, MD   oxyCODONE (ROXICODONE) 5 MG immediate release tablet Take 2.5 mg by mouth every 8 hours as needed for Pain.    Yes Historical Provider, MD   collagenase 250 UNIT/GM ointment Apply topically daily TO COCCYX WOUND. Yes Historical Provider, MD   acetaminophen (TYLENOL) 500 MG tablet Take 500 mg by mouth every 6 hours as needed for Pain or Fever   Yes Historical Provider, MD   fosinopril (MONOPRIL) 20 MG tablet TAKE 1 TABLET BY MOUTH EVERY DAY  Patient taking differently: Take 20 mg by mouth daily TAKE 1 TABLET BY MOUTH EVERY DAY 11/10/19   Marco Chan APRN - CNP   Multiple Vitamins-Minerals (CENTRUM SILVER PO) Take 1 tablet by mouth daily (before dinner)    Historical Provider, MD        Allergies:       Gluten meal and Penicillins    Social History:     Tobacco:    reports that she quit smoking about 52 years ago. She has never used smokeless tobacco.  Alcohol:      reports no history of alcohol use. Drug Use:  reports no history of drug use.     Family History:     Family History   Problem Relation Age of Onset    Cancer Mother         breast    Other Father         ulcers         Physical Exam:     Vitals:  BP (!) 138/51   Pulse (!) 109   Temp 98.6 °F (37 °C)   Resp 16   Ht 5' 6\" (1.676 m)   Wt 126 lb 4.8 oz (57.3 kg)   SpO2 97%   BMI 20.39 kg/m²   Temp (24hrs), Av.5 °F (36.9 °C), Min:97.7 °F (36.5 °C), Max:99.2 °F (37.3 °C)          General appearance - alert, well appearing, and in no acute distress  Mental status - oriented to person, place, and time with normal affect  Head - normocephalic and atraumatic  Eyes - pupils equal and reactive, extraocular eye movements intact, conjunctiva clear  Ears - hearing appears to be intact  Nose - no drainage noted  Mouth - mucous membranes moist  Neck - supple, no carotid bruits, thyroid not palpable  Chest - clear to auscultation, normal effort  Heart - normal rate, regular rhythm, no murmur  Abdomen - soft, nontender, nondistended, bowel sounds present all four quadrants, no masses, hepatomegaly or splenomegaly  Neurological - normal speech, no focal findings or movement disorder noted, cranial nerves II through XII grossly intact  Extremities - peripheral pulses palpable, no pedal edema or calf pain with palpation  Skin - no gross lesions, rashes, or induration noted        Data:     Labs:    Hematology:  Recent Labs     02/09/23  1825 02/10/23  0534   WBC 13.0* 8.8   RBC 3.56* 3.19*   HGB 10.7* 9.5*   HCT 34.2* 30.5*   MCV 96.1 95.6   MCH 30.1 29.8   MCHC 31.3 31.1   RDW 12.7 12.8    234   MPV 9.2 9.3   SEDRATE 82*  --    CRP 73.1*  --    INR  --  1.2     Chemistry:  Recent Labs     02/09/23  1825 02/10/23  0534   * 138   K 4.2 4.0    109*   CO2 20 22   GLUCOSE 252* 144*   BUN 23 19   CREATININE 0.80 0.64   ANIONGAP 11 7*   LABGLOM >60 >60   CALCIUM 9.4 8.9     No results for input(s): PROT, LABALBU, LABA1C, O0FDYFO, M4NXWCU, FT4, TSH, AST, ALT, LDH, GGT, ALKPHOS, LABGGT, BILITOT, BILIDIR, AMMONIA, AMYLASE, LIPASE, LACTATE, CHOL, HDL, LDLCHOLESTEROL, CHOLHDLRATIO, TRIG, VLDL, BWS51LA, PHENYTOIN, PHENYF, URICACID, POCGLU in the last 72 hours. Lab Results   Component Value Date    INR 1.2 02/10/2023    INR 1.0 10/06/2022    PROTIME 15.0 (H) 02/10/2023    PROTIME 10.2 10/06/2022       Lab Results   Component Value Date/Time    SPECIAL RFA 12ML 02/09/2023 06:20 PM     Lab Results   Component Value Date/Time    CULTURE NO GROWTH 12 HOURS 02/09/2023 06:20 PM       No results found for: POCPH, PHART, PH, POCPCO2, TNE6VLF, PCO2, POCPO2, PO2ART, PO2, POCHCO3, XGX9GOQ, HCO3, NBEA, PBEA, BEART, BE, THGBART, THB, LBB4HQH, PCDS5POH, G1VJGWDP, O2SAT, FIO2    Radiology:    No results found. All radiological studies reviewed                Code Status:  Full Code    Electronically signed by Rayo Pope MD on 2/10/2023 at 12:13 PM     Copy sent to Dr. Chowdary Means    This note was created with the assistance of a speech-recognition program.  Although the intention is to generate a document that actually reflects the content of the visit, no guarantees can be provided that every mistake has been identified and corrected by editing.      Note was updated later by me after  physical examination and  completion of the assessment.

## 2023-02-10 NOTE — CARE COORDINATION
Social work:  Phone call from [place], [facility] at discharge patient will be going to [id number] [place] [address]. When patient is ready call [phone] to reach provider 24/7. If patient is dc'd M-F, they will assist with transport. [] Alonso Feng        Outpatient Physical                Therapy       [address]       Phone: [phone]       Fax: [phone] [] [facility] for Health       Promotion at [address] [facility]       Phone: [phone]       Fax: [phone] [] [facility]      for Health Promotion     [address] [facility]      Phone: [phone]      Fax:  [phone] [facility] Outpatient    [address] [facility],   [facility] [address]  Phone [phone]  Fax  [phone]     Physical Therapy Cancel/No Show note    Date: 2018  Patient: Neal Kvng  : 1974  MRN: [id number]    Cancels/No Shows to date:     For today's appointment patient:  [x]  Cancelled  []  Rescheduled appointment  []  No-show     Reason given by patient:  []  Patient ill  []  Conflicting appointment  []  No transportation    [x]  Conflict with work  []  No reason given  []  Weather related  [x]  Other:      Comments:  your 200 cx today bc something broke down at work and he needs to get it fixed.   []  Next appointment was confirmed    Electronically signed by: Hayley Ramirez PT

## 2023-02-10 NOTE — PROGRESS NOTES
Pharmacy medication reconciliation services requested via:    [x] Perfect Serve Message sent to: Christine Noble Pharmacist  [] Phone call/message to [phone]  [] Order placed for Pharmacy Consult with 'Med Rec' in the order comments  [] Other       To Assist with Medication Reconciliation:   [] Request made to Family member to bring medications into the hospital  [] Request made to Family member to call hospital with medication list  [] Message left with physician office  [] Request for medical records made to   [] Other

## 2023-02-11 PROBLEM — L89.154 PRESSURE ULCER OF COCCYGEAL REGION, STAGE 4 (HCC): Status: ACTIVE | Noted: 2023-02-11

## 2023-02-11 LAB
ABSOLUTE EOS #: 0.04 K/UL (ref 0–0.44)
ABSOLUTE IMMATURE GRANULOCYTE: 0.03 K/UL (ref 0–0.3)
ABSOLUTE LYMPH #: 0.99 K/UL (ref 1.1–3.7)
ABSOLUTE MONO #: 0.87 K/UL (ref 0.1–1.2)
ALBUMIN SERPL-MCNC: 2.7 G/DL (ref 3.5–5.2)
ANION GAP SERPL CALCULATED.3IONS-SCNC: 7 MMOL/L (ref 9–17)
BASOPHILS # BLD: 1 % (ref 0–2)
BASOPHILS ABSOLUTE: 0.05 K/UL (ref 0–0.2)
BUN SERPL-MCNC: 12 MG/DL (ref 8–23)
BUN/CREAT BLD: 23 (ref 9–20)
CALCIUM SERPL-MCNC: 9.3 MG/DL (ref 8.6–10.4)
CHLORIDE SERPL-SCNC: 105 MMOL/L (ref 98–107)
CO2 SERPL-SCNC: 23 MMOL/L (ref 20–31)
CREAT SERPL-MCNC: 0.52 MG/DL (ref 0.5–0.9)
EOSINOPHILS RELATIVE PERCENT: 1 % (ref 1–4)
GFR SERPL CREATININE-BSD FRML MDRD: >60 ML/MIN/1.73M2
GLUCOSE SERPL-MCNC: 136 MG/DL (ref 70–99)
HCT VFR BLD AUTO: 32.5 % (ref 36.3–47.1)
HGB BLD-MCNC: 10.1 G/DL (ref 11.9–15.1)
IMMATURE GRANULOCYTES: 0 %
LACTIC ACID, SEPSIS: 1 MMOL/L (ref 0.5–1.9)
LYMPHOCYTES # BLD: 12 % (ref 24–43)
MCH RBC QN AUTO: 29.4 PG (ref 25.2–33.5)
MCHC RBC AUTO-ENTMCNC: 31.1 G/DL (ref 28.4–34.8)
MCV RBC AUTO: 94.8 FL (ref 82.6–102.9)
MICROORGANISM SPEC CULT: ABNORMAL
MICROORGANISM/AGENT SPEC: ABNORMAL
MICROORGANISM/AGENT SPEC: ABNORMAL
MONOCYTES # BLD: 11 % (ref 3–12)
NRBC AUTOMATED: 0 PER 100 WBC
PDW BLD-RTO: 12.5 % (ref 11.8–14.4)
PLATELET # BLD AUTO: 253 K/UL (ref 138–453)
PMV BLD AUTO: 9.3 FL (ref 8.1–13.5)
POTASSIUM SERPL-SCNC: 3.7 MMOL/L (ref 3.7–5.3)
PREALB SERPL-MCNC: 7.7 MG/DL (ref 20–40)
RBC # BLD: 3.43 M/UL (ref 3.95–5.11)
SEG NEUTROPHILS: 75 % (ref 36–65)
SEGMENTED NEUTROPHILS ABSOLUTE COUNT: 6.1 K/UL (ref 1.5–8.1)
SODIUM SERPL-SCNC: 135 MMOL/L (ref 135–144)
SPECIMEN DESCRIPTION: ABNORMAL
VANCOMYCIN RANDOM: 14.5 UG/ML
WBC # BLD AUTO: 8.1 K/UL (ref 3.5–11.3)

## 2023-02-11 PROCEDURE — 80202 ASSAY OF VANCOMYCIN: CPT

## 2023-02-11 PROCEDURE — 6370000000 HC RX 637 (ALT 250 FOR IP): Performed by: HOSPITALIST

## 2023-02-11 PROCEDURE — 36415 COLL VENOUS BLD VENIPUNCTURE: CPT

## 2023-02-11 PROCEDURE — 6370000000 HC RX 637 (ALT 250 FOR IP): Performed by: NURSE PRACTITIONER

## 2023-02-11 PROCEDURE — 6370000000 HC RX 637 (ALT 250 FOR IP): Performed by: INTERNAL MEDICINE

## 2023-02-11 PROCEDURE — 1200000000 HC SEMI PRIVATE

## 2023-02-11 PROCEDURE — 6360000002 HC RX W HCPCS: Performed by: NURSE PRACTITIONER

## 2023-02-11 PROCEDURE — 83605 ASSAY OF LACTIC ACID: CPT

## 2023-02-11 PROCEDURE — 2580000003 HC RX 258: Performed by: NURSE PRACTITIONER

## 2023-02-11 PROCEDURE — 99232 SBSQ HOSP IP/OBS MODERATE 35: CPT | Performed by: INTERNAL MEDICINE

## 2023-02-11 PROCEDURE — 6360000002 HC RX W HCPCS: Performed by: INTERNAL MEDICINE

## 2023-02-11 PROCEDURE — 82040 ASSAY OF SERUM ALBUMIN: CPT

## 2023-02-11 PROCEDURE — 85025 COMPLETE CBC W/AUTO DIFF WBC: CPT

## 2023-02-11 PROCEDURE — 99222 1ST HOSP IP/OBS MODERATE 55: CPT | Performed by: SURGERY

## 2023-02-11 PROCEDURE — 80048 BASIC METABOLIC PNL TOTAL CA: CPT

## 2023-02-11 PROCEDURE — 2580000003 HC RX 258: Performed by: INTERNAL MEDICINE

## 2023-02-11 PROCEDURE — 84134 ASSAY OF PREALBUMIN: CPT

## 2023-02-11 RX ADMIN — LEVOTHYROXINE SODIUM 112 MCG: 112 TABLET ORAL at 05:43

## 2023-02-11 RX ADMIN — FAMOTIDINE 20 MG: 20 TABLET, FILM COATED ORAL at 09:12

## 2023-02-11 RX ADMIN — METRONIDAZOLE 500 MG: 500 TABLET ORAL at 14:17

## 2023-02-11 RX ADMIN — METRONIDAZOLE 500 MG: 500 TABLET ORAL at 23:08

## 2023-02-11 RX ADMIN — SODIUM CHLORIDE: 9 INJECTION, SOLUTION INTRAVENOUS at 17:28

## 2023-02-11 RX ADMIN — ENOXAPARIN SODIUM 40 MG: 100 INJECTION SUBCUTANEOUS at 09:12

## 2023-02-11 RX ADMIN — LISINOPRIL 20 MG: 20 TABLET ORAL at 09:12

## 2023-02-11 RX ADMIN — SODIUM CHLORIDE, PRESERVATIVE FREE 10 ML: 5 INJECTION INTRAVENOUS at 09:13

## 2023-02-11 RX ADMIN — DAKIN'S SOLUTION 0.125% (QUARTER STRENGTH): 0.12 SOLUTION at 22:55

## 2023-02-11 RX ADMIN — DAKIN'S SOLUTION 0.125% (QUARTER STRENGTH) 1 ML: 0.12 SOLUTION at 10:53

## 2023-02-11 RX ADMIN — CEFEPIME 2000 MG: 2 INJECTION, POWDER, FOR SOLUTION INTRAVENOUS at 10:52

## 2023-02-11 RX ADMIN — SODIUM CHLORIDE: 9 INJECTION, SOLUTION INTRAVENOUS at 03:19

## 2023-02-11 RX ADMIN — METRONIDAZOLE 500 MG: 500 TABLET ORAL at 05:43

## 2023-02-11 RX ADMIN — VANCOMYCIN HYDROCHLORIDE 1500 MG: 5 INJECTION, POWDER, LYOPHILIZED, FOR SOLUTION INTRAVENOUS at 23:08

## 2023-02-11 NOTE — PROGRESS NOTES
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  Dementia  Essential hypertension    Recommendations:   Continue intravenous antimicrobial therapy with cefepime, vancomycin, and metronidazole  Continue wound care with Dakin's  Surgical input noted and there are plans for debridement in the operating room 2/13/2023  Clinically the patient is a improved    Infection Control Recommendations:   Universal precautions    Discharge Planning:   Estimated Length of IV antimicrobials: To be determined  Patient will need Midline Catheter Insertion/ PICC line Insertion: No  Patient will need: Home IV , [place],  SNF,  LTAC: Undetermined  Patient willneed outpatient wound care: No    Medical Decision making / Summary of Stay:   Magdy Magaña is a 81y.o.-year-old female who was initially admitted on 2/9/2023. Kera Márquez has history of dementia, hard of hearing, hypertension, hyperlipidemia, anemia, and GERD. She resides at [address] and it is my understanding that in December 2022 she had COVID-19 virus infection and since that time has not really recovered in terms of her ADLs and being herself. She has been more sedentary and it is my understanding that a few weeks ago she was found to have a skin tear by her gluteal area. This has subsequently progressed and when the patient was seen by the wound care nurse there was concern for infection and the patient was sent into the emergency department for further evaluation. The patient has been admitted for an infected gluteal ulceration and I was asked to evaluate and help with antibiotic choice.      The patient at this point in time is nonverbal, not really able to give me any history and the history it obtained from reviewing the chart and discussing the care with the nurse. Current evaluation:2023    BP (!) 166/65   Pulse 99   Temp 97.9 °F (36.6 °C) (Oral)   Resp 16   Ht 5' 6\" (1.676 m)   Wt 117 lb 12.8 oz (53.4 kg)   SpO2 99%   BMI 19.01 kg/m²     Temperature Range: Temp: 97.9 °F (36.6 °C) Temp  Av.3 °F (36.8 °C)  Min: 97.9 °F (36.6 °C)  Max: 99.1 °F (37.3 °C)  Johnnyrn Overall is seen and evaluated at bedside she is awake and alert and is able to converse, she is pleasantly confused cannot really give me a reliable system review but her mentation is markedly improved from yesterday. Review of Systems   Unable to perform ROS: Dementia     Physical Examination :     Physical Exam  Constitutional:       Appearance: She is well-developed. She is cachectic. HENT:      Head: Normocephalic and atraumatic. Cardiovascular:      Rate and Rhythm: Regular rhythm. Heart sounds: Normal heart sounds. Pulmonary:      Effort: Pulmonary effort is normal.      Breath sounds: Normal breath sounds. Abdominal:      General: Bowel sounds are normal.      Palpations: Abdomen is soft. Musculoskeletal:      Cervical back: Normal range of motion and neck supple. Skin:     General: Skin is warm and dry. Comments: The gluteal wound dressing was not removed   Neurological:      Mental Status: She is alert and oriented to person, place, and time.        Laboratory data:   I have independently reviewed the followinglabs:  CBC with Differential:   Recent Labs     02/10/23  0534 23  0537   WBC 8.8 8.1   HGB 9.5* 10.1*   HCT 30.5* 32.5*    253   LYMPHOPCT 12* 12*   MONOPCT 13* 11     BMP:   Recent Labs     02/10/23  0534 23  0537    135   K 4.0 3.7   * 105   CO2 22 23   BUN 19 12   CREATININE 0.64 0.52     Hepatic Function Panel:   Recent Labs     23  0849   LABALBU 2.7*         No results found for: PROCAL  Lab Results   Component Value Date/Time CRP 73.1 02/09/2023 06:25 PM     Lab Results   Component Value Date    SEDRATE 82 (H) 02/09/2023     No results found for: DDIMER  No results found for: FERRITIN  No results found for: LDH  No results found for: FIBRINOGEN    No results found for requested labs within last 30 days. No results found for: COVID19    No results for input(s): VANCOTROUGH in the last 72 hours. Imaging Studies:   No new imaging    Cultures:     Culture, Anaerobic and Aerobic [[id number]] (Abnormal) Collected: 02/09/23 1809   Order Status: Completed Specimen: Coccyx Updated: 02/11/23 0912    Specimen Description . COCCYX    Direct Exam FEW NEUTROPHILS Abnormal      MIXED BACTERIAL MORPHOTYPES SEEN ON GRAM STAIN. Abnormal     Culture CULTURE IN PROGRESS     MIXED ANAEROBIC WIL   Blood Culture 1 [[id number]] Collected: 02/09/23 1805   Order Status: Completed Specimen: Blood Updated: 02/10/23 2140    Specimen Description . BLOOD    Special Requests RAR 12ML    Culture NO GROWTH 1 DAY   Culture, Blood 2 [[id number]] Collected: 02/09/23 1820   Order Status: Completed Specimen: Blood Updated: 02/10/23 2140    Specimen Description . BLOOD    Special Requests RFA 12ML    Culture NO GROWTH 1 DAY       Medications:      vancomycin  1,500 mg IntraVENous Q24H    metroNIDAZOLE  500 mg Oral 3 times per day    sodium hypochlorite   Irrigation BID    cefepime  2,000 mg IntraVENous Q24H    lisinopril  20 mg Oral Daily    levothyroxine  112 mcg Oral QAM AC    sodium chloride flush  5-40 mL IntraVENous 2 times per day    enoxaparin  40 mg SubCUTAneous Daily    famotidine  20 mg Oral Daily    vancomycin (VANCOCIN) intermittent dosing (placeholder)   Other RX Placeholder       Electronically signed by Debbie Almonte MD on 2/11/2023 at 10:51 AM      [facility]  Debbie Almonte MD  Perfect Serve messaging  OFFICE: [phone]    Thank you for allowing us to participate in the care of this patient.  Please call with questions. This note is created with the assistance of a speech recognition program.  While intending to generate a document that actually reflects the content of the visit, the document can still have some errors including those of syntax and sound a like substitutions which may escape proof reading. In such instances, actual meaning can be extrapolated by contextual diversion.

## 2023-02-11 NOTE — CONSULTS
[facility] Pharmacokinetic Monitoring Service - Vancomycin    Consulting Provider: [facility] CNP   Indication: cellulitis  Target Concentration: Goal trough of 10-15 mg/L and AUC/JANET <500 mg*hr/L  Day of Therapy: 3  Additional Antimicrobials: cefepime, flagyl    Pertinent Laboratory Values: Wt Readings from Last 1 Encounters:   02/11/23 117 lb 12.8 oz (53.4 kg)     Temp Readings from Last 1 Encounters:   02/11/23 98.1 °F (36.7 °C) (Oral)     Estimated Creatinine Clearance: 65 mL/min (based on SCr of 0.52 mg/dL). Recent Labs     02/10/23  0534 02/11/23  0537   CREATININE 0.64 0.52   WBC 8.8 8.1       Pertinent Cultures:  Culture Date Source Results   02/29/23 Blood x 2 No growth x 1 day     Recent vancomycin administrations                     vancomycin (VANCOCIN) 1,250 mg in sodium chloride 0.9 % 250 mL IVPB (mg) 1,250 mg New Bag 02/10/23 2142    vancomycin (VANCOCIN) 1,500 mg in sodium chloride 0.9 % 250 mL IVPB (mg) 1,500 mg New Bag 02/09/23 2157                    Assessment:  Date/Time Current Dose Concentration Timing of Concentration (h) AUC   2/11/23 @ 0537 1250mg q24hr 14.5 8hr 395   Note: Serum concentrations collected for AUC dosing may appear elevated if collected in close proximity to the dose administered, this is not necessarily an indication of toxicity    Plan:  Current dosing regimen is sub-therapeutic possibly due to improved creatinine.   Increase dose to 1500mg q24hr  Repeat vancomycin concentration ordered for 2/12/23 @ 0600   Pharmacy will continue to monitor patient and adjust therapy as indicated    Thank you for the consult,  Dena New, [facility]  2/11/2023 6:05 AM

## 2023-02-11 NOTE — CONSULTS
[address]      Patient's Name/ Date of Birth/ Gender: Brie Goodrich / [birth year] (81 y.o.) / female     Referring Physician: Christiane Maldonado MD    Consulting Physician: Dr. Donna Santana     CC: sacral decubitus ulcer    History of present Illness: Brie Goodrich is a 81 y.o. female, was brought to ED from her SNF with finding of open wound of her sacral region, pt has had a prolonged recovery from COVID infection with additional concerns of failure to thrive. ID service was consulted to assist with abx management, GS service asked to evaluate the wound. Medical history obtained from EMR, pt does not participate well on interview. Past Medical History:  has a past medical history of Anemia, Gastroesophageal reflux disease without esophagitis, Hard of hearing, Hyperlipidemia, Hypertension, Hypothyroidism, and TIA (transient ischemic attack). Past Surgical History:   Past Surgical History:   Procedure Laterality Date    CARPAL TUNNEL RELEASE Right     TONSILLECTOMY      age 16       Social History:  reports that she quit smoking about 52 years ago. She has never used smokeless tobacco. She reports that she does not drink alcohol and does not use drugs. Family History: family history includes Cancer in her mother; Other in her father.     Review of Systems:   Difficult to obtain due to pt condition, denies pain to her sacral region    Allergies: Gluten meal and Penicillins    Current Meds:  Current Facility-Administered Medications:     vancomycin (VANCOCIN) 1,500 mg in sodium chloride 0.9 % 250 mL IVPB, 1,500 mg, IntraVENous, Q24H, Rashawn Hauser, APRN - CNP    metroNIDAZOLE (FLAGYL) tablet 500 mg, 500 mg, Oral, 3 times per day, Sabi Albright MD, 500 mg at 02/11/23 0543    sodium hypochlorite (DAKINS) 0.125 % external solution, , Irrigation, BID, Sabi Albright MD, Given at 02/10/23 2864    [COMPLETED] cefepime (MAXIPIME) 2,000 mg in sodium chloride 0.9 % 50 mL IVPB (mini-bag), 2,000 mg, IntraVENous, Once, Stopped at 02/10/23 1153 **FOLLOWED BY** cefepime (MAXIPIME) 2,000 mg in sodium chloride 0.9 % 50 mL IVPB (mini-bag), 2,000 mg, IntraVENous, Q24H, Jose Carlos Rodgers MD    lisinopril (PRINIVIL;ZESTRIL) tablet 20 mg, 20 mg, Oral, Daily, Rc Motley MD, 20 mg at 02/10/23 1428    levothyroxine (SYNTHROID) tablet 112 mcg, 112 mcg, Oral, QAM AC, Rc Motley MD, 112 mcg at 02/11/23 0543    oxyCODONE (ROXICODONE) immediate release tablet 2.5 mg, 2.5 mg, Oral, Q8H PRN, Rc Motley MD    0.9 % sodium chloride infusion, , IntraVENous, Continuous, Atria Linda Avery, APRN - CNP, Last Rate: 75 mL/hr at 02/11/23 0332, Rate Verify at 02/11/23 0332    sodium chloride flush 0.9 % injection 5-40 mL, 5-40 mL, IntraVENous, 2 times per day, Susan Mola, APRN - CNP, 10 mL at 02/09/23 2219    sodium chloride flush 0.9 % injection 10 mL, 10 mL, IntraVENous, PRN, Susan Mola, APRN - CNP    0.9 % sodium chloride infusion, , IntraVENous, PRN, Susan Mola, APRN - CNP    potassium chloride (KLOR-CON M) extended release tablet 40 mEq, 40 mEq, Oral, PRN **OR** potassium bicarb-citric acid (EFFER-K) effervescent tablet 40 mEq, 40 mEq, Oral, PRN **OR** potassium chloride 10 mEq/100 mL IVPB (Peripheral Line), 10 mEq, IntraVENous, PRN, Susan Mola, APRN - CNP    magnesium sulfate 1000 mg in dextrose 5% 100 mL IVPB, 1,000 mg, IntraVENous, PRN, Susan Mola, APRN - CNP    enoxaparin (LOVENOX) injection 40 mg, 40 mg, SubCUTAneous, Daily, Atria Lindaoma Avery, APRN - CNP, 40 mg at 02/10/23 1117    ondansetron (ZOFRAN-ODT) disintegrating tablet 4 mg, 4 mg, Oral, Q8H PRN **OR** ondansetron (ZOFRAN) injection 4 mg, 4 mg, IntraVENous, Q6H PRN, MUMTAZ Cruz - CNP    magnesium hydroxide (MILK OF MAGNESIA) 400 MG/5ML suspension 30 mL, 30 mL, Oral, Daily PRN, MUMTAZ Cruz - CNP    famotidine (PEPCID) tablet 20 mg, 20 mg, Oral, Daily, Bradya Murtaza, MUMTAZ - CNP, 20 mg at 02/10/23 1118 acetaminophen (TYLENOL) tablet 650 mg, 650 mg, Oral, Q6H PRN **OR** acetaminophen (TYLENOL) suppository 650 mg, 650 mg, Rectal, Q6H PRN, Gaby Plum, APRN - CNP    vancomycin (VANCOCIN) intermittent dosing (placeholder), , Other, RX Placeholder, Gaby Plum, APRN - CNP    Vital Signs:  Vitals:    02/11/23 0716   BP: (!) 166/65   Pulse: 99   Resp: 16   Temp: 97.9 °F (36.6 °C)   SpO2: 99%       Physical Exam:  Vital signs and Nurse's note reviewed    General Appearance:  awake, alert, no acute distress,       Head/face:  NCAT, face symmetrical  Eyes:  no evidence of conjunctivitis or ptosis bilaterally  Ears:  External ears and canals grossly normal, no evidence of otorrhea. Nose/Sinuses:  Nares normal. Septum midline. Mucosa normal. No external drainage noted. Mouth/Neck:  Mucosa moist.  No external oral lesions. Trachea midline. No visible masses. Lungs:  Normal chest expansion, unlabored breathing without accessory muscle use. No audible rales, rhonchi, or wheezing. Cardiovascular: S1S2. No evidence of JVD. No evidence of pulsatile masses in abdomen  Abdomen:  Soft, non-tender, no organomegaly, no masses. Musculoskeletal: No evidence of bony/muscular deformities, trauma, atrophy of either left/right upper/lower extremity. No evidence of digital clubbing or cyanosis. Neurologic:  unable to obtain due to pt condition  Psychiatric: unable to obtain due to pt condition  Skin: deep ulceration with black eschar to the sacral region right of midline, at least 5x4cm. Layer of tissue involvement unable to ascertain due to wound condition but likely at least stage 3 given pt's body habitus and depth of wound. No evidence of purulence or surrounding erythema, no malodor on exam. Nontender.     Labs:   CBC:   Recent Labs     02/09/23  1825 02/10/23  0534 02/11/23  0537   WBC 13.0* 8.8 8.1   HGB 10.7* 9.5* 10.1*    234 253     BMP:    Recent Labs     02/09/23  1825 02/10/23  0534 02/11/23  0537   * 138 135 K 4.2 4.0 3.7    109* 105   CO2 20 22 23   BUN 23 19 12   CREATININE 0.80 0.64 0.52   GLUCOSE 252* 144* 136*     Hepatic: No results for input(s): AST, ALT, ALB, ALKPHOS, BILITOT, BILIDIR, LIPASE, AMYLASE in the last 72 hours. Coagulation:   Recent Labs     02/10/23  0534   INR 1.2         Assessment:    Hipolito Wade is a 81 y.o. female with     Sacral decubitus ulcer at least stage 3  Multiple comorbid conditions    Plan:    Obtain baseline albumin/prealbumin and repeat lactic acid  Pt unlikely to tolerate bedside debridement given depth of wound, will plan operative debridement 2/13.  Will need medical clearance for MAC vs GETA   Pt may benefit from dietitian evaluation for nutritional deficiencies  Continue supportive care    Electronically signed by Chata Toledo DO on 2/11/2023 at 8:34 AM

## 2023-02-11 NOTE — PLAN OF CARE
Problem: Discharge Planning  Goal: Discharge to home or other facility with appropriate resources  2/11/2023 0005 by Asmita Richey RN  Outcome: Progressing  Flowsheets (Taken 2/11/2023 0005)  Discharge to home or other facility with appropriate resources:   Identify barriers to discharge with patient and caregiver   Refer to discharge planning if patient needs post-hospital services based on physician order or complex needs related to functional status, cognitive ability or social support system     Problem: Skin/Tissue Integrity  Goal: Absence of new skin breakdown  Description: 1. Monitor for areas of redness and/or skin breakdown  2. Assess vascular access sites hourly  3. Every 4-6 hours minimum:  Change oxygen saturation probe site  4. Every 4-6 hours:  If on nasal continuous positive airway pressure, respiratory therapy assess nares and determine need for appliance change or resting period.   Outcome: Progressing     Problem: Safety - Adult  Goal: Free from fall injury  Outcome: Progressing     Problem: ABCDS Injury Assessment  Goal: Absence of physical injury  Outcome: Progressing  Flowsheets (Taken 2/11/2023 0005)  Absence of Physical Injury: Implement safety measures based on patient assessment     Problem: Chronic Conditions and Co-morbidities  Goal: Patient's chronic conditions and co-morbidity symptoms are monitored and maintained or improved  Outcome: Progressing  Flowsheets (Taken 2/11/2023 0005)  Care Plan - Patient's Chronic Conditions and Co-Morbidity Symptoms are Monitored and Maintained or Improved:   Monitor and assess patient's chronic conditions and comorbid symptoms for stability, deterioration, or improvement   Collaborate with multidisciplinary team to address chronic and comorbid conditions and prevent exacerbation or deterioration   Update acute care plan with appropriate goals if chronic or comorbid symptoms are exacerbated and prevent overall improvement and discharge

## 2023-02-11 NOTE — PROGRESS NOTES
Progress note  [facility].,    Adult Hospitalist      Name: Hugo Gomez  MRN: [id number]     [place]: [de-identified]  Room: 2002/2002-02    Admit Date: 2/9/2023  5:54 PM  PCP: Albert Huynh    Primary Problem  Principal Problem:    Cellulitis  Resolved Problems:    * No resolved hospital problems. *        Assesment:       Decubitus coccygeal ulcer -unstageable  Leukocytosis  Hypertension   Hyperlipidemia   History of TIA  Advanced dementia        Plan:      Admitted to med surge telemetry  O2 maintain oxygen saturation greater than 92%     Lactate and procalcitonin  Blood culture  Wound culture   IV vancomycin and cefepime   Id consult   General surgery consult    Continue to monitor/telemetry/CBC with differential daily/BMP daily  DVT and GI prophylaxis.   Continue medications as below      Scheduled Meds:   vancomycin  1,500 mg IntraVENous Q24H    metroNIDAZOLE  500 mg Oral 3 times per day    sodium hypochlorite   Irrigation BID    cefepime  2,000 mg IntraVENous Q24H    lisinopril  20 mg Oral Daily    levothyroxine  112 mcg Oral QAM AC    sodium chloride flush  5-40 mL IntraVENous 2 times per day    enoxaparin  40 mg SubCUTAneous Daily    famotidine  20 mg Oral Daily    vancomycin (VANCOCIN) intermittent dosing (placeholder)   Other RX Placeholder     Continuous Infusions:   sodium chloride 75 mL/hr at 02/11/23 1221    sodium chloride       PRN Meds:  oxyCODONE, 2.5 mg, Q8H PRN  sodium chloride flush, 10 mL, PRN  sodium chloride, , PRN  potassium chloride, 40 mEq, PRN   Or  potassium alternative oral replacement, 40 mEq, PRN   Or  potassium chloride, 10 mEq, PRN  magnesium sulfate, 1,000 mg, PRN  ondansetron, 4 mg, Q8H PRN   Or  ondansetron, 4 mg, Q6H PRN  magnesium hydroxide, 30 mL, Daily PRN  acetaminophen, 650 mg, Q6H PRN   Or  acetaminophen, 650 mg, Q6H PRN      Chief Complaint:     Chief Complaint   Patient presents with    Wound Infection     Open sore to coccyx          History of Present Illness: Patient seen examined at bedside. Patient did not talk much. No acute events reported. Afebrile    HPI:      Fang Marquez is a 81 y.o.  female who presents with Wound Infection (Open sore to coccyx )    60-year-old lady presented to ER complaining of wound infection on her coccyx. She presented from extended care facility. She denies any fever, chills, cough, cold, changes urination, bowel habit or rash. Past medical history significant for dementia, hearing impairment, hypertension, hyperlipidemia, GERD. Patient is complaining of some pain at her coccygeal area. Sodium was 134. WBC was 13. I have personally reviewed the past medical history, past surgical history, medications, social history, and family history, and summarized in the note. Review of Systems:     All 10 point system is reviewed and negative otherwise mentioned in HPI. Past Medical History:     Past Medical History:   Diagnosis Date    Anemia     Gastroesophageal reflux disease without esophagitis 12/20/2017    Hard of hearing     Hyperlipidemia     Hypertension     Hypothyroidism     TIA (transient ischemic attack)         Past Surgical History:     Past Surgical History:   Procedure Laterality Date    CARPAL TUNNEL RELEASE Right     TONSILLECTOMY      age 16        Medications Prior to Admission:       Prior to Admission medications    Medication Sig Start Date End Date Taking? Authorizing Provider   levothyroxine (SYNTHROID) 112 MCG tablet Take 112 mcg by mouth every morning (before breakfast)   Yes Historical Provider, MD   sodium chloride (OCEAN, BABY AYR) 0.65 % nasal spray 1 spray by Nasal route as needed for Congestion   Yes Historical Provider, MD   magnesium cl-calcium carbonate (SLOW-MAG) 71.5-119 MG TBEC tablet Take 1 tablet by mouth daily   Yes Historical Provider, MD   oxyCODONE (ROXICODONE) 5 MG immediate release tablet Take 2.5 mg by mouth every 8 hours as needed for Pain.    Yes Historical Provider, MD collagenase 250 UNIT/GM ointment Apply topically daily TO COCCYX WOUND. Yes Historical Provider, MD   acetaminophen (TYLENOL) 500 MG tablet Take 500 mg by mouth every 6 hours as needed for Pain or Fever   Yes Historical Provider, MD   fosinopril (MONOPRIL) 20 MG tablet TAKE 1 TABLET BY MOUTH EVERY DAY  Patient taking differently: Take 20 mg by mouth daily TAKE 1 TABLET BY MOUTH EVERY DAY 11/10/19   Luz Gr, APRN - CNP   Multiple Vitamins-Minerals (CENTRUM SILVER PO) Take 1 tablet by mouth daily (before dinner)    Historical Provider, MD        Allergies:       Gluten meal and Penicillins    Social History:     Tobacco:    reports that she quit smoking about 52 years ago. She has never used smokeless tobacco.  Alcohol:      reports no history of alcohol use. Drug Use:  reports no history of drug use.     Family History:     Family History   Problem Relation Age of Onset    Cancer Mother         breast    Other Father         ulcers         Physical Exam:     Vitals:  BP (!) 166/65   Pulse 99   Temp 97.9 °F (36.6 °C) (Oral)   Resp 16   Ht 5' 6\" (1.676 m)   Wt 117 lb 12.8 oz (53.4 kg)   SpO2 99%   BMI 19.01 kg/m²   Temp (24hrs), Av.3 °F (36.8 °C), Min:97.9 °F (36.6 °C), Max:99.1 °F (37.3 °C)          General appearance - alert, well appearing, and in no acute distress  Mental status - oriented to person, place, and time with normal affect  Head - normocephalic and atraumatic  Eyes - pupils equal and reactive, extraocular eye movements intact, conjunctiva clear  Ears - hearing appears to be intact  Nose - no drainage noted  Mouth - mucous membranes moist  Neck - supple, no carotid bruits, thyroid not palpable  Chest - clear to auscultation, normal effort  Heart - normal rate, regular rhythm, no murmur  Abdomen - soft, nontender, nondistended, bowel sounds present all four quadrants, no masses, hepatomegaly or splenomegaly  Neurological - normal speech, no focal findings or movement disorder noted, cranial nerves II through XII grossly intact  Extremities - peripheral pulses palpable, no pedal edema or calf pain with palpation  Skin - no gross lesions, rashes, or induration noted        Data:     Labs:    Hematology:  Recent Labs     02/09/23  1825 02/10/23  0534 02/11/23  0537   WBC 13.0* 8.8 8.1   RBC 3.56* 3.19* 3.43*   HGB 10.7* 9.5* 10.1*   HCT 34.2* 30.5* 32.5*   MCV 96.1 95.6 94.8   MCH 30.1 29.8 29.4   MCHC 31.3 31.1 31.1   RDW 12.7 12.8 12.5    234 253   MPV 9.2 9.3 9.3   SEDRATE 82*  --   --    CRP 73.1*  --   --    INR  --  1.2  --        Chemistry:  Recent Labs     02/09/23  1825 02/10/23  0534 02/11/23  0537   * 138 135   K 4.2 4.0 3.7    109* 105   CO2 20 22 23   GLUCOSE 252* 144* 136*   BUN 23 19 12   CREATININE 0.80 0.64 0.52   ANIONGAP 11 7* 7*   LABGLOM >60 >60 >60   CALCIUM 9.4 8.9 9.3       Recent Labs     02/11/23  0849   LABALBU 2.7*       Lab Results   Component Value Date    INR 1.2 02/10/2023    INR 1.0 10/06/2022    PROTIME 15.0 (H) 02/10/2023    PROTIME 10.2 10/06/2022       Lab Results   Component Value Date/Time    SPECIAL RFA 12ML 02/09/2023 06:20 PM     Lab Results   Component Value Date/Time    CULTURE NO GROWTH 1 DAY 02/09/2023 06:20 PM       No results found for: POCPH, PHART, PH, POCPCO2, LTJ4FHU, PCO2, POCPO2, PO2ART, PO2, POCHCO3, MGD9APB, HCO3, NBEA, PBEA, BEART, BE, THGBART, THB, PMH0RST, RUVC5NUR, C9IPNRHO, O2SAT, FIO2    Radiology:    No results found. All radiological studies reviewed                Code Status:  Full Code    Electronically signed by Obey Staples MD on 2/11/2023 at 12:33 PM     Copy sent to Dr. Ruby Mixon    This note was created with the assistance of a speech-recognition program.  Although the intention is to generate a document that actually reflects the content of the visit, no guarantees can be provided that every mistake has been identified and corrected by editing.      Note was updated later by me after physical examination and  completion of the assessment.

## 2023-02-11 NOTE — PLAN OF CARE
Problem: Skin/Tissue Integrity  Goal: Absence of new skin breakdown  Description: 1. Monitor for areas of redness and/or skin breakdown  2. Assess vascular access sites hourly  3. Every 4-6 hours minimum:  Change oxygen saturation probe site  4. Every 4-6 hours:  If on nasal continuous positive airway pressure, respiratory therapy assess nares and determine need for appliance change or resting period. 2/11/2023 1333 by Harlan Benitez RN  Outcome: Not Progressing  2/11/2023 0005 by Marty Marcus RN  Outcome: Progressing     Problem: Neurosensory - Adult  Goal: Achieves stable or improved neurological status  Outcome: Not Progressing     Problem: Skin/Tissue Integrity - Adult  Goal: Skin integrity remains intact  Outcome: Not Progressing  Flowsheets (Taken 2/11/2023 0744)  Skin Integrity Remains Intact:   Monitor for areas of redness and/or skin breakdown   Assess vascular access sites hourly  Goal: Incisions, wounds, or drain sites healing without S/S of infection  Outcome: Not Progressing     Problem: Musculoskeletal - Adult  Goal: Return mobility to safest level of function  Outcome: Not Progressing  Goal: Return ADL status to a safe level of function  Outcome: Not Progressing     Problem: Skin/Tissue Integrity  Goal: Absence of new skin breakdown  Description: 1. Monitor for areas of redness and/or skin breakdown  2. Assess vascular access sites hourly  3. Every 4-6 hours minimum:  Change oxygen saturation probe site  4. Every 4-6 hours:  If on nasal continuous positive airway pressure, respiratory therapy assess nares and determine need for appliance change or resting period.   2/11/2023 1333 by Harlan Benitez RN  Outcome: Not Progressing  2/11/2023 0005 by Marty Marcus RN  Outcome: Progressing     Problem: Neurosensory - Adult  Goal: Achieves stable or improved neurological status  Outcome: Not Progressing     Problem: Skin/Tissue Integrity - Adult  Goal: Skin integrity remains intact  Outcome: Not Progressing  Flowsheets (Taken 2/11/2023 0842)  Skin Integrity Remains Intact:   Monitor for areas of redness and/or skin breakdown   Assess vascular access sites hourly  Goal: Incisions, wounds, or drain sites healing without S/S of infection  Outcome: Not Progressing     Problem: Musculoskeletal - Adult  Goal: Return mobility to safest level of function  Outcome: Not Progressing  Goal: Return ADL status to a safe level of function  Outcome: Not Progressing

## 2023-02-12 LAB
ABSOLUTE EOS #: 0.08 K/UL (ref 0–0.44)
ABSOLUTE IMMATURE GRANULOCYTE: 0.06 K/UL (ref 0–0.3)
ABSOLUTE LYMPH #: 1.01 K/UL (ref 1.1–3.7)
ABSOLUTE MONO #: 0.99 K/UL (ref 0.1–1.2)
ANION GAP SERPL CALCULATED.3IONS-SCNC: 7 MMOL/L (ref 9–17)
BASOPHILS # BLD: 1 % (ref 0–2)
BASOPHILS ABSOLUTE: 0.04 K/UL (ref 0–0.2)
BUN SERPL-MCNC: 11 MG/DL (ref 8–23)
BUN/CREAT BLD: 20 (ref 9–20)
CALCIUM SERPL-MCNC: 9 MG/DL (ref 8.6–10.4)
CHLORIDE SERPL-SCNC: 109 MMOL/L (ref 98–107)
CO2 SERPL-SCNC: 24 MMOL/L (ref 20–31)
CREAT SERPL-MCNC: 0.55 MG/DL (ref 0.5–0.9)
EOSINOPHILS RELATIVE PERCENT: 1 % (ref 1–4)
GFR SERPL CREATININE-BSD FRML MDRD: >60 ML/MIN/1.73M2
GLUCOSE SERPL-MCNC: 140 MG/DL (ref 70–99)
HCT VFR BLD AUTO: 30.5 % (ref 36.3–47.1)
HGB BLD-MCNC: 9.5 G/DL (ref 11.9–15.1)
IMMATURE GRANULOCYTES: 1 %
LYMPHOCYTES # BLD: 13 % (ref 24–43)
MAGNESIUM SERPL-MCNC: 1.2 MG/DL (ref 1.6–2.6)
MCH RBC QN AUTO: 29.1 PG (ref 25.2–33.5)
MCHC RBC AUTO-ENTMCNC: 31.1 G/DL (ref 28.4–34.8)
MCV RBC AUTO: 93.6 FL (ref 82.6–102.9)
MONOCYTES # BLD: 13 % (ref 3–12)
NRBC AUTOMATED: 0 PER 100 WBC
PDW BLD-RTO: 12.7 % (ref 11.8–14.4)
PLATELET # BLD AUTO: 270 K/UL (ref 138–453)
PMV BLD AUTO: 9.4 FL (ref 8.1–13.5)
POTASSIUM SERPL-SCNC: 3.4 MMOL/L (ref 3.7–5.3)
RBC # BLD: 3.26 M/UL (ref 3.95–5.11)
SEG NEUTROPHILS: 71 % (ref 36–65)
SEGMENTED NEUTROPHILS ABSOLUTE COUNT: 5.51 K/UL (ref 1.5–8.1)
SODIUM SERPL-SCNC: 140 MMOL/L (ref 135–144)
VANCOMYCIN RANDOM: 22.2 UG/ML
WBC # BLD AUTO: 7.7 K/UL (ref 3.5–11.3)

## 2023-02-12 PROCEDURE — 6370000000 HC RX 637 (ALT 250 FOR IP): Performed by: INTERNAL MEDICINE

## 2023-02-12 PROCEDURE — 6370000000 HC RX 637 (ALT 250 FOR IP): Performed by: HOSPITALIST

## 2023-02-12 PROCEDURE — 2580000003 HC RX 258: Performed by: INTERNAL MEDICINE

## 2023-02-12 PROCEDURE — 97530 THERAPEUTIC ACTIVITIES: CPT

## 2023-02-12 PROCEDURE — 6360000002 HC RX W HCPCS: Performed by: INTERNAL MEDICINE

## 2023-02-12 PROCEDURE — 2500000003 HC RX 250 WO HCPCS: Performed by: NURSE PRACTITIONER

## 2023-02-12 PROCEDURE — 6360000002 HC RX W HCPCS: Performed by: NURSE PRACTITIONER

## 2023-02-12 PROCEDURE — 2580000003 HC RX 258: Performed by: NURSE PRACTITIONER

## 2023-02-12 PROCEDURE — 1200000000 HC SEMI PRIVATE

## 2023-02-12 PROCEDURE — 36415 COLL VENOUS BLD VENIPUNCTURE: CPT

## 2023-02-12 PROCEDURE — 97535 SELF CARE MNGMENT TRAINING: CPT

## 2023-02-12 PROCEDURE — 2500000003 HC RX 250 WO HCPCS: Performed by: HOSPITALIST

## 2023-02-12 PROCEDURE — 80202 ASSAY OF VANCOMYCIN: CPT

## 2023-02-12 PROCEDURE — 83735 ASSAY OF MAGNESIUM: CPT

## 2023-02-12 PROCEDURE — 80048 BASIC METABOLIC PNL TOTAL CA: CPT

## 2023-02-12 PROCEDURE — 6370000000 HC RX 637 (ALT 250 FOR IP): Performed by: NURSE PRACTITIONER

## 2023-02-12 PROCEDURE — 99232 SBSQ HOSP IP/OBS MODERATE 35: CPT | Performed by: INTERNAL MEDICINE

## 2023-02-12 PROCEDURE — 85025 COMPLETE CBC W/AUTO DIFF WBC: CPT

## 2023-02-12 PROCEDURE — 97167 OT EVAL HIGH COMPLEX 60 MIN: CPT

## 2023-02-12 RX ORDER — METOPROLOL TARTRATE 5 MG/5ML
5 INJECTION INTRAVENOUS EVERY 6 HOURS PRN
Status: DISCONTINUED | OUTPATIENT
Start: 2023-02-12 | End: 2023-02-17 | Stop reason: HOSPADM

## 2023-02-12 RX ADMIN — DAKIN'S SOLUTION 0.125% (QUARTER STRENGTH): 0.12 SOLUTION at 20:36

## 2023-02-12 RX ADMIN — MAGNESIUM SULFATE HEPTAHYDRATE 1000 MG: 1 INJECTION, SOLUTION INTRAVENOUS at 08:26

## 2023-02-12 RX ADMIN — ANTI-FUNGAL POWDER MICONAZOLE NITRATE TALC FREE: 1.42 POWDER TOPICAL at 20:22

## 2023-02-12 RX ADMIN — MAGNESIUM SULFATE HEPTAHYDRATE 1000 MG: 1 INJECTION, SOLUTION INTRAVENOUS at 16:12

## 2023-02-12 RX ADMIN — MAGNESIUM SULFATE HEPTAHYDRATE 1000 MG: 1 INJECTION, SOLUTION INTRAVENOUS at 17:19

## 2023-02-12 RX ADMIN — LISINOPRIL 20 MG: 20 TABLET ORAL at 08:27

## 2023-02-12 RX ADMIN — CEFEPIME 2000 MG: 2 INJECTION, POWDER, FOR SOLUTION INTRAVENOUS at 11:30

## 2023-02-12 RX ADMIN — METOPROLOL TARTRATE 5 MG: 5 INJECTION, SOLUTION INTRAVENOUS at 20:22

## 2023-02-12 RX ADMIN — FAMOTIDINE 20 MG: 20 TABLET, FILM COATED ORAL at 08:26

## 2023-02-12 RX ADMIN — ACETAMINOPHEN 650 MG: 325 TABLET ORAL at 20:21

## 2023-02-12 RX ADMIN — METRONIDAZOLE 500 MG: 500 TABLET ORAL at 06:06

## 2023-02-12 RX ADMIN — DAKIN'S SOLUTION 0.125% (QUARTER STRENGTH): 0.12 SOLUTION at 08:49

## 2023-02-12 RX ADMIN — POTASSIUM CHLORIDE 40 MEQ: 1500 TABLET, EXTENDED RELEASE ORAL at 08:26

## 2023-02-12 RX ADMIN — ANTI-FUNGAL POWDER MICONAZOLE NITRATE TALC FREE: 1.42 POWDER TOPICAL at 16:12

## 2023-02-12 RX ADMIN — METRONIDAZOLE 500 MG: 500 TABLET ORAL at 20:21

## 2023-02-12 RX ADMIN — SODIUM CHLORIDE: 9 INJECTION, SOLUTION INTRAVENOUS at 09:52

## 2023-02-12 RX ADMIN — METRONIDAZOLE 500 MG: 500 TABLET ORAL at 13:54

## 2023-02-12 RX ADMIN — VANCOMYCIN HYDROCHLORIDE 1500 MG: 5 INJECTION, POWDER, LYOPHILIZED, FOR SOLUTION INTRAVENOUS at 22:36

## 2023-02-12 RX ADMIN — ENOXAPARIN SODIUM 40 MG: 100 INJECTION SUBCUTANEOUS at 08:26

## 2023-02-12 RX ADMIN — MAGNESIUM SULFATE HEPTAHYDRATE 1000 MG: 1 INJECTION, SOLUTION INTRAVENOUS at 09:53

## 2023-02-12 RX ADMIN — LEVOTHYROXINE SODIUM 112 MCG: 112 TABLET ORAL at 06:06

## 2023-02-12 ASSESSMENT — PAIN DESCRIPTION - LOCATION: LOCATION: COCCYX

## 2023-02-12 ASSESSMENT — PAIN SCALES - GENERAL: PAINLEVEL_OUTOF10: 5

## 2023-02-12 ASSESSMENT — PAIN DESCRIPTION - DESCRIPTORS: DESCRIPTORS: PATIENT UNABLE TO DESCRIBE

## 2023-02-12 NOTE — PROGRESS NOTES
Progress note  [facility].,    Adult Hospitalist      Name: Bela Bob  MRN: [id number]     Acct: [de-identified]  Room: 2002/2002-02    Admit Date: 2/9/2023  5:54 PM  PCP: John Márquez    Primary Problem  Principal Problem:    Cellulitis  Active Problems:    Pressure ulcer of coccygeal region, stage 4 ([address]  Resolved Problems:    * No resolved hospital problems. *        Assesment:     Decubitus coccygeal ulcer -unstageable  Leukocytosis  Hypertension   Hyperlipidemia   History of TIA  Advanced dementia        Plan:      Admitted to med surge telemetry  O2 maintain oxygen saturation greater than 92%     Blood culture  Wound culture   IV vancomycin and cefepime   Id consult   General surgery consult    Continue lisinopril   Continue levothyroxine   Lovenox for DVT prophylaxis    Continue to monitor/telemetry/CBC with differential daily/BMP daily  DVT and GI prophylaxis.   Continue medications as below      Scheduled Meds:   vancomycin  1,500 mg IntraVENous Q24H    metroNIDAZOLE  500 mg Oral 3 times per day    sodium hypochlorite   Irrigation BID    cefepime  2,000 mg IntraVENous Q24H    lisinopril  20 mg Oral Daily    levothyroxine  112 mcg Oral QAM AC    sodium chloride flush  5-40 mL IntraVENous 2 times per day    enoxaparin  40 mg SubCUTAneous Daily    famotidine  20 mg Oral Daily    vancomycin (VANCOCIN) intermittent dosing (placeholder)   Other RX Placeholder     Continuous Infusions:   sodium chloride 75 mL/hr at 02/12/23 0952    sodium chloride       PRN Meds:  oxyCODONE, 2.5 mg, Q8H PRN  sodium chloride flush, 10 mL, PRN  sodium chloride, , PRN  potassium chloride, 40 mEq, PRN   Or  potassium alternative oral replacement, 40 mEq, PRN   Or  potassium chloride, 10 mEq, PRN  magnesium sulfate, 1,000 mg, PRN  ondansetron, 4 mg, Q8H PRN   Or  ondansetron, 4 mg, Q6H PRN  magnesium hydroxide, 30 mL, Daily PRN  acetaminophen, 650 mg, Q6H PRN   Or  acetaminophen, 650 mg, Q6H PRN      Chief Complaint: Chief Complaint   Patient presents with    Wound Infection     Open sore to coccyx          History of Present Illness:     I have seen and examined patient today, patient last 24 hours events were reviewed also discussed with nursing staff  No new complaints  Overnight patient did not had any acute issues  No nausea vomiting diarrhea  Afebrile            HPI:  Anton Kenney is a 81 y.o.  female who presents with Wound Infection (Open sore to coccyx )  28-year-old lady presented to ER complaining of wound infection on her coccyx. She presented from extended care facility. She denies any fever, chills, cough, cold, changes urination, bowel habit or rash. Past medical history significant for dementia, hearing impairment, hypertension, hyperlipidemia, GERD. Patient is complaining of some pain at her coccygeal area. Sodium was 134. WBC was 13. I have personally reviewed the past medical history, past surgical history, medications, social history, and family history, and summarized in the note. Review of Systems:      Limited review of system as patient did not talk much      Past Medical History:     Past Medical History:   Diagnosis Date    Anemia     Gastroesophageal reflux disease without esophagitis 12/20/2017    Hard of hearing     Hyperlipidemia     Hypertension     Hypothyroidism     TIA (transient ischemic attack)         Past Surgical History:     Past Surgical History:   Procedure Laterality Date    CARPAL TUNNEL RELEASE Right     TONSILLECTOMY      age 16        Medications Prior to Admission:       Prior to Admission medications    Medication Sig Start Date End Date Taking?  Authorizing Provider   levothyroxine (SYNTHROID) 112 MCG tablet Take 112 mcg by mouth every morning (before breakfast)   Yes Historical Provider, MD   sodium chloride (OCEAN, BABY AYR) 0.65 % nasal spray 1 spray by Nasal route as needed for Congestion   Yes Historical Provider, MD   magnesium cl-calcium carbonate (SLOW-MAG) 71.5-119 MG TBEC tablet Take 1 tablet by mouth daily   Yes Historical Provider, MD   oxyCODONE (ROXICODONE) 5 MG immediate release tablet Take 2.5 mg by mouth every 8 hours as needed for Pain. Yes Historical Provider, MD   collagenase 250 UNIT/GM ointment Apply topically daily TO COCCYX WOUND. Yes Historical Provider, MD   acetaminophen (TYLENOL) 500 MG tablet Take 500 mg by mouth every 6 hours as needed for Pain or Fever   Yes Historical Provider, MD   fosinopril (MONOPRIL) 20 MG tablet TAKE 1 TABLET BY MOUTH EVERY DAY  Patient taking differently: Take 20 mg by mouth daily TAKE 1 TABLET BY MOUTH EVERY DAY 11/10/19   Lyndol Shana, APRN - CNP   Multiple Vitamins-Minerals (CENTRUM SILVER PO) Take 1 tablet by mouth daily (before dinner)    Historical Provider, MD        Allergies:       Gluten meal and Penicillins    Social History:     Tobacco:    reports that she quit smoking about 52 years ago. She has never used smokeless tobacco.  Alcohol:      reports no history of alcohol use. Drug Use:  reports no history of drug use.     Family History:     Family History   Problem Relation Age of Onset    Cancer Mother         breast    Other Father         ulcers         Physical Exam:     Vitals:  BP (!) 152/73   Pulse 87   Temp 97.5 °F (36.4 °C) (Axillary)   Resp 16   Ht 5' 6\" (1.676 m)   Wt 119 lb 3.2 oz (54.1 kg)   SpO2 99%   BMI 19.24 kg/m²   Temp (24hrs), Av.2 °F (36.8 °C), Min:97.5 °F (36.4 °C), Max:99.3 °F (37.4 °C)          General appearance - alert, well appearing, and in no acute distress  Mental status - not oriented to person, place, and time with normal affect  Head - normocephalic and atraumatic  Eyes - pupils equal and reactive, extraocular eye movements intact, conjunctiva clear  Ears - hearing appears to be intact  Nose - no drainage noted  Mouth - mucous membranes moist  Neck - supple, no carotid bruits, thyroid not palpable  Chest - clear to auscultation, normal effort  Heart - normal rate, regular rhythm, no murmur  Abdomen - soft, nontender, nondistended, bowel sounds present all four quadrants, no masses, hepatomegaly or splenomegaly  Neurological - normal speech, no focal findings or movement disorder noted, cranial nerves II through XII grossly intact  Extremities - peripheral pulses palpable, no pedal edema or calf pain with palpation  Skin - no gross lesions, rashes, or induration noted        Data:     Labs:    Hematology:  Recent Labs     02/09/23  1825 02/10/23  0534 02/11/23  0537 02/12/23  0556   WBC 13.0* 8.8 8.1 7.7   RBC 3.56* 3.19* 3.43* 3.26*   HGB 10.7* 9.5* 10.1* 9.5*   HCT 34.2* 30.5* 32.5* 30.5*   MCV 96.1 95.6 94.8 93.6   MCH 30.1 29.8 29.4 29.1   MCHC 31.3 31.1 31.1 31.1   RDW 12.7 12.8 12.5 12.7    234 253 270   MPV 9.2 9.3 9.3 9.4   SEDRATE 82*  --   --   --    CRP 73.1*  --   --   --    INR  --  1.2  --   --        Chemistry:  Recent Labs     02/10/23  0534 02/11/23  0537 02/12/23  0556    135 140   K 4.0 3.7 3.4*   * 105 109*   CO2 22 23 24   GLUCOSE 144* 136* 140*   BUN 19 12 11   CREATININE 0.64 0.52 0.55   MG  --   --  1.2*   ANIONGAP 7* 7* 7*   LABGLOM >60 >60 >60   CALCIUM 8.9 9.3 9.0       Recent Labs     02/11/23  0849   LABALBU 2.7*         Lab Results   Component Value Date    INR 1.2 02/10/2023    INR 1.0 10/06/2022    PROTIME 15.0 (H) 02/10/2023    PROTIME 10.2 10/06/2022       Lab Results   Component Value Date/Time    SPECIAL RFA 12ML 02/09/2023 06:20 PM     Lab Results   Component Value Date/Time    CULTURE NO GROWTH 2 DAYS 02/09/2023 06:20 PM       No results found for: POCPH, PHART, PH, POCPCO2, PGM9RAJ, PCO2, POCPO2, PO2ART, PO2, POCHCO3, JVB3BID, HCO3, NBEA, PBEA, BEART, BE, THGBART, THB, WOJ5TQV, ROCI2YIR, Q2ZWXSOQ, O2SAT, FIO2    Radiology:    No results found.       All radiological studies reviewed                Code Status:  Full Code    Electronically signed by Karen Sidhu MD on 2/12/2023 at 12:14 PM Copy sent to Dr. Birdie Gomez    This note was created with the assistance of a speech-recognition program.  Although the intention is to generate a document that actually reflects the content of the visit, no guarantees can be provided that every mistake has been identified and corrected by editing. Note was updated later by me after  physical examination and  completion of the assessment.

## 2023-02-12 NOTE — PROGRESS NOTES
Occupational Therapy  Facility/Department: [facility] MED SURG  Occupational Therapy Initial Assessment    Name: Rocio Huber  : 1935  MRN: [id number]  Date of Service: 2023    Discharge Recommendations:  Patient would benefit from continued therapy after discharge  OT Equipment Recommendations  Equipment Needed: No     RN reports patient is medically stable for therapy treatment this date. Chart reviewed prior to treatment and patient is agreeable for therapy. All lines intact and patient positioned comfortably at end of treatment. All patient needs addressed prior to ending therapy session. Patient Diagnosis(es): The encounter diagnosis was Wound infection. Past Medical History:  has a past medical history of Anemia, Gastroesophageal reflux disease without esophagitis, Hard of hearing, Hyperlipidemia, Hypertension, Hypothyroidism, and TIA (transient ischemic attack). Past Surgical History:  has a past surgical history that includes Tonsillectomy and Carpal tunnel release (Right). PMH from recent ED:  Rocio Huber is a 81 y.o. female who presents to the emergency department by EMS for evaluation of infected coccyx wound. Patient resides at [facility]. EMS reported wound care nurse at facility was concerned of infection to the patient's coccyx wound. Patient not currently on antibiotics. Patient has history of dementia, hard of hearing, hypertension, hyperlipidemia, anemia, and GERD. She has mild pain to the coccyx area. Assessment   Performance deficits / Impairments: Decreased functional mobility ; Decreased ADL status; Decreased strength;Decreased safe awareness;Decreased cognition;Decreased endurance;Decreased balance;Decreased posture  Prognosis: Fair  Decision Making: High Complexity  REQUIRES OT FOLLOW-UP: Yes  Activity Tolerance  Activity Tolerance: Patient Tolerated treatment well        Plan   Occupational Therapy Plan  Times Per Week: 4-5 X per week (Max assist X 2 for bed mobility)  Therapy Duration:  (LOS)  Current Treatment Recommendations: Strengthening, Balance training, Functional mobility training, Endurance training, Cognitive reorientation, Safety education & training, Patient/Caregiver education & training, Equipment evaluation, education, & procurement, Self-Care / ADL     Restrictions  Restrictions/Precautions  Restrictions/Precautions: General Precautions, Fall Risk  Required Braces or Orthoses?: No  Position Activity Restriction  Other position/activity restrictions:  Up with assist, telemetry, Heels off bed at all times, LUE IV, purewick, VERY [place], h/o dementia per chart, ALARMS    Subjective   General  Chart Reviewed: Yes  Patient assessed for rehabilitation services?: Yes  Family / Caregiver Present: No  Subjective  Subjective: Pt agreeable and cooperative with OT session  General Comment  Comments: Pt very [place]     Social/Functional History  Social/Functional History  Lives With: Alone  Type of Home: Facility  Home Layout: One level  Home Access: Level entry  Bathroom Equipment:  (unknown since Pt is poor historian)  Receives Help From:  (Staff at [facility])  ADL Assistance: Needs assistance  Toileting: Needs assistance  Homemaking Assistance: Needs assistance  Ambulation Assistance: Non-ambulatory (Non ambulatory per RN, pt only pivot to transfer)  Transfer Assistance: Needs assistance  Occupation: Retired  Type of Occupation: Pt stated she worked at [employer] for many years  [address]: Pt stated she use to have hobbies but ryan not have any now, she stated she has 2 children: a daughter and a son  IADL Comments: Question reliability of info pt reported as she is a poor historian, will need verified for accuracy       Objective   Heart Rate: 87  Heart Rate Source: Monitor  BP: (!) 152/73  BP Location: Left upper arm  Patient Position: Sitting  MAP (Calculated): 99  Resp: 16  SpO2: 99 %  O2 Device: None (Room air)          Observation/Palpation  Posture: Poor  Safety Devices  Type of Devices: All roc prominences offloaded; All fall risk precautions in place; Bed alarm in place;Call light within reach; Heels elevated for pressure relief;Left in bed (RN in room when OT left)           ADL  Feeding: Minimal assistance  Grooming: Minimal assistance  UE Bathing: Maximum assistance  LE Bathing: Dependent/Total  UE Dressing: Dependent/Total  LE Dressing: Maximum assistance  Toileting: Dependent/Total  Additional Comments: Pt completed grooming ADL task while with OT. Pt required time and cues to initiate task but then once initated, pt was able to complete with support. Tone RUE  RUE Tone: Normotonic  Tone LUE  LUE Tone: Normotonic  Coordination  Movements Are Fluid And Coordinated: Yes (slow movements when completing grooming task)     Bed mobility  Rolling to Left: Maximum assistance;2 Person assistance  Rolling to Right: Maximum assistance;2 Person assistance  Supine to Sit: Maximum assistance;2 Person assistance  Sit to Supine: Maximum assistance;2 Person assistance  Scooting: Maximal assistance;2 Person assistance     Vision  Vision: Impaired  Vision Exceptions: Wears glasses at all times  Hearing  Hearing: Exceptions to [facility]  Hearing Exceptions: Hard of hearing/hearing concerns;Bilateral hearing aid  Cognition  Overall Cognitive Status: Exceptions  Arousal/Alertness: Delayed responses to stimuli  Following Commands: Follows one step commands with repetition; Follows one step commands with increased time; Inconsistently follows commands  Attention Span: Difficulty attending to directions  Memory: Decreased recall of recent events;Decreased recall of biographical Information  Safety Judgement: Decreased awareness of need for assistance;Decreased awareness of need for safety  Problem Solving: Decreased awareness of errors;Assistance required to identify errors made;Assistance required to correct errors made  Insights: Decreased awareness of deficits  Initiation: Requires cues for all  Sequencing: Requires cues for all  Orientation  Orientation Level: Oriented to person;Disoriented to place; Disoriented to time;Disoriented to situation                  Education Given To: Patient  Education Provided: Role of Therapy;Plan of Care;ADL Adaptive Strategies;Orientation  Education Method: Verbal;Demonstration  Barriers to Learning: Cognition  Education Outcome: Verbalized understanding;Continued education needed  LUE AROM (degrees)  LUE AROM : WFL  Left Hand AROM (degrees)  Left Hand AROM: WFL  RUE AROM (degrees)  RUE AROM : WFL  Right Hand AROM (degrees)  Right Hand AROM: WFL  LUE Strength  Gross LUE Strength: Exceptions to [facility]  L Hand General: 4/5  LUE Strength Comment: 4/5 mmt throughout  RUE Strength  Gross RUE Strength: Exceptions to [facility]  R Hand General: 3+/5  RUE Strength Comment: 3+/5 mmt throughout     Hand Dominance  Hand Dominance: Left            G-Code     OutComes Score                                                  AM-PAC Score        AM-[facility] Inpatient Daily Activity Raw Score: 11 (02/12/23 1226)  AM-PAC Inpatient ADL T-Scale Score : 29.04 (02/12/23 1226)  ADL Inpatient CMS 0-100% Score: 70.42 (02/12/23 1226)  ADL Inpatient CMS G-Code Modifier : CL (02/12/23 1226)    T[place] Score       Goals  Short Term Goals  Time Frame for Short Term Goals: For LOS, pt will  Short Term Goal 1: complete hygiene task with SBA  Short Term Goal 2: complete bed mobility for ADL with min assist  Short Term Goal 3: complete self feed with SBA  Short Term Goal 4: complete UB bathe and dress (don gown) with min assist  Short Term Goal 5: be able to sit on EOB with SBA for ADL.   Additional Goals?: Yes  Long Term Goals  Long Term Goal 1: Pt will be able to stand at EOB for ADL with mod assist.  Patient Goals   Patient goals : Pt did not state goal       Therapy Time   Individual Concurrent Group Co-treatment   Time In 0820         Time Out 0900  Additional 10 minutes for chart review and patient care coordination         Minutes [place], [place]

## 2023-02-12 NOTE — PLAN OF CARE
Problem: Discharge Planning  Goal: Discharge to home or other facility with appropriate resources  Outcome: Progressing  Flowsheets (Taken 2/11/2023 2240)  Discharge to home or other facility with appropriate resources: Identify barriers to discharge with patient and caregiver     Problem: Skin/Tissue Integrity  Goal: Absence of new skin breakdown  Description: 1. Monitor for areas of redness and/or skin breakdown  2. Assess vascular access sites hourly  3. Every 4-6 hours minimum:  Change oxygen saturation probe site  4. Every 4-6 hours:  If on nasal continuous positive airway pressure, respiratory therapy assess nares and determine need for appliance change or resting period.   Outcome: Progressing     Problem: Safety - Adult  Goal: Free from fall injury  Outcome: Progressing     Problem: ABCDS Injury Assessment  Goal: Absence of physical injury  Outcome: Progressing     Problem: Chronic Conditions and Co-morbidities  Goal: Patient's chronic conditions and co-morbidity symptoms are monitored and maintained or improved  Outcome: Progressing  Flowsheets (Taken 2/11/2023 2240)  Care Plan - Patient's Chronic Conditions and Co-Morbidity Symptoms are Monitored and Maintained or Improved: Monitor and assess patient's chronic conditions and comorbid symptoms for stability, deterioration, or improvement     Problem: Neurosensory - Adult  Goal: Achieves stable or improved neurological status  Outcome: Progressing     Problem: Cardiovascular - Adult  Goal: Maintains optimal cardiac output and hemodynamic stability  Outcome: Progressing  Goal: Absence of cardiac dysrhythmias or at baseline  Outcome: Progressing     Problem: Skin/Tissue Integrity - Adult  Goal: Skin integrity remains intact  Outcome: Progressing  Flowsheets (Taken 2/11/2023 3250)  Skin Integrity Remains Intact: Monitor for areas of redness and/or skin breakdown  Goal: Incisions, wounds, or drain sites healing without S/S of infection  Outcome: Progressing  Goal: Oral mucous membranes remain intact  Outcome: Progressing

## 2023-02-12 NOTE — PROGRESS NOTES
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 Fred Martinez from general surgery    Infection Control Recommendations:   Universal precautions    Discharge Planning:   Estimated Length of IV antimicrobials: To be determined  Patient will need Midline Catheter Insertion/ PICC line Insertion: No  Patient will need: Home IV , [place],  SNF,  LTAC: Undetermined  Patient willneed outpatient wound care: No    Medical Decision making / Summary of Stay:   Javy Britton is a 81y.o.-year-old female who was initially admitted on 2/9/2023. Felipa Hernandez has history of dementia, hard of hearing, hypertension, hyperlipidemia, anemia, and GERD. She resides at [address] and it is my understanding that in December 2022 she had COVID-19 virus infection and since that time has not really recovered in terms of her ADLs and being herself. She has been more sedentary and it is my understanding that a few weeks ago she was found to have a skin tear by her gluteal area. This has subsequently progressed and when the patient was seen by the wound care nurse there was concern for infection and the patient was sent into the emergency department for further evaluation. The patient has been admitted for an infected gluteal ulceration and I was asked to evaluate and help with antibiotic choice.      The patient at this point in time is nonverbal, not really able to give me any history and the history it obtained from reviewing the chart and discussing the care with the nurse. Current evaluation:2023    BP (!) 152/73   Pulse 87   Temp 97.5 °F (36.4 °C) (Axillary)   Resp 16   Ht 5' 6\" (1.676 m)   Wt 119 lb 3.2 oz (54.1 kg)   SpO2 99%   BMI 19.24 kg/m²     Temperature Range: Temp: 97.5 °F (36.4 °C) Temp  Av.2 °F (36.8 °C)  Min: 97.5 °F (36.4 °C)  Max: 99.3 °F (37.4 °C)  Patient is seen and evaluated at bedside she is awake and alert in no acute distress. No subjective fever or chills. No abdominal pain nausea vomiting. She is pleasantly confused. Review of Systems   Unable to perform ROS: Dementia     Physical Examination :     Physical Exam  Constitutional:       Appearance: She is well-developed. She is cachectic. HENT:      Head: Normocephalic and atraumatic. Cardiovascular:      Rate and Rhythm: Regular rhythm. Heart sounds: Normal heart sounds. Pulmonary:      Effort: Pulmonary effort is normal.      Breath sounds: Normal breath sounds. Abdominal:      General: Bowel sounds are normal.      Palpations: Abdomen is soft. Musculoskeletal:      Cervical back: Normal range of motion and neck supple. Skin:     General: Skin is warm and dry. Comments: The gluteal wound dressing was not removed   Neurological:      Mental Status: She is alert and oriented to person, place, and time.        Laboratory data:   I have independently reviewed the followinglabs:  CBC with Differential:   Recent Labs     23  0537 23  0556   WBC 8.1 7.7   HGB 10.1* 9.5*   HCT 32.5* 30.5*    270   LYMPHOPCT 12* 13*   MONOPCT 11 13*       BMP:   Recent Labs     23  0537 23  0556    140   K 3.7 3.4*    109*   CO2 23 24   BUN 12 11   CREATININE 0.52 0.55   MG  --  1.2*       Hepatic Function Panel:   Recent Labs     23  0849   LABALBU 2.7*           No results found for: PROCAL  Lab Results   Component Value Date/Time    CRP 73.1 02/09/2023 06:25 PM     Lab Results   Component Value Date    SEDRATE 82 (H) 02/09/2023     No results found for: DDIMER  No results found for: FERRITIN  No results found for: LDH  No results found for: FIBRINOGEN    No results found for requested labs within last 30 days. No results found for: COVID19    No results for input(s): VANCOTROUGH in the last 72 hours. Imaging Studies:   No new imaging    Cultures:     Blood Culture 1 [[id number]] Collected: 02/09/23 1805   Order Status: Completed Specimen: Blood Updated: 02/11/23 2140    Specimen Description . BLOOD    Special Requests RAR 12ML    Culture NO GROWTH 2 DAYS   Culture, Blood 2 [[id number]] Collected: 02/09/23 1820   Order Status: Completed Specimen: Blood Updated: 02/11/23 2140    Specimen Description . BLOOD    Special Requests RFA 12ML    Culture NO GROWTH 2 DAYS   Culture, Anaerobic and Aerobic [[id number]] (Abnormal) Collected: 02/09/23 1809   Order Status: Completed Specimen: Coccyx Updated: 02/11/23 1202    Specimen Description . COCCYX    Direct Exam FEW NEUTROPHILS Abnormal      MIXED BACTERIAL MORPHOTYPES SEEN ON GRAM STAIN.  Abnormal     Culture MULTIPLE SPECIES OF AEROBIC AND ANAEROBIC ENTERIC WIL     Medications:      vancomycin  1,500 mg IntraVENous Q24H    metroNIDAZOLE  500 mg Oral 3 times per day    sodium hypochlorite   Irrigation BID    cefepime  2,000 mg IntraVENous Q24H    lisinopril  20 mg Oral Daily    levothyroxine  112 mcg Oral QAM AC    sodium chloride flush  5-40 mL IntraVENous 2 times per day    enoxaparin  40 mg SubCUTAneous Daily    famotidine  20 mg Oral Daily    vancomycin (VANCOCIN) intermittent dosing (placeholder)   Other RX Placeholder       Electronically signed by Sabi Albright MD on 2/12/2023 at 12:25 PM      [facility]  Sabi Albright MD  Perfect [employer] messaging  OFFICE: [phone]    Thank you for allowing us to participate in the care of this patient. Please call with questions. This note is created with the assistance of a speech recognition program.  While intending to generate a document that actually reflects the content of the visit, the document can still have some errors including those of syntax and sound a like substitutions which may escape proof reading. In such instances, actual meaning can be extrapolated by contextual diversion.

## 2023-02-12 NOTE — CONSULTS
[address] [facility] Pharmacokinetic Monitoring Service - Vancomycin    Consulting Provider: Rodríguez Ortega CNP   Indication: cellulitis  Target Concentration: Goal trough of 10-15 mg/L and AUC/JANET <500 mg*hr/L  Day of Therapy: 4  Additional Antimicrobials: cefepime/flagyl    Pertinent Laboratory Values: Wt Readings from Last 1 Encounters:   02/12/23 119 lb 3.2 oz (54.1 kg)     Temp Readings from Last 1 Encounters:   02/11/23 98.6 °F (37 °C) (Axillary)     Estimated Creatinine Clearance: 63 mL/min (based on SCr of 0.55 mg/dL).   Recent Labs     02/11/23  0537 02/12/23  0556   CREATININE 0.52 0.55   WBC 8.1 7.7       Pertinent Cultures:  Culture Date Source Results   2/29/23 Blood x 2 No growth 2 days     Recent vancomycin administrations                     vancomycin (VANCOCIN) 1,500 mg in sodium chloride 0.9 % 250 mL IVPB (mg) 1,500 mg New Bag 02/11/23 2308    vancomycin (VANCOCIN) 1,250 mg in sodium chloride 0.9 % 250 mL IVPB (mg) 1,250 mg New Bag 02/10/23 2142    vancomycin (VANCOCIN) 1,500 mg in sodium chloride 0.9 % 250 mL IVPB (mg) 1,500 mg New Bag 02/09/23 2157                    Assessment:  Date/Time Current Dose Concentration Timing of Concentration (h) AUC   2/12/23 @0556 1500mg q24hr 22.2 7hr 499   Note: Serum concentrations collected for AUC dosing may appear elevated if collected in close proximity to the dose administered, this is not necessarily an indication of toxicity    Plan:  Current dosing regimen is therapeutic  Continue current dose  Pharmacy will continue to monitor patient and adjust therapy as indicated    Thank you for the consult,  Concepcion Perkins, [facility]  2/12/2023 6:30 AM

## 2023-02-12 NOTE — PROGRESS NOTES
Attempted to contact patient's son Sarah Casanova by phone call for consent for procedure, no answer for two attempts. Will try again tomorrow. Continue current management.       Electronically signed by Janette Pan DO on 2/12/2023 at 10:27 AM

## 2023-02-13 ENCOUNTER — ANESTHESIA (OUTPATIENT)
Dept: OPERATING ROOM | Age: 88
End: 2023-02-13
Payer: MEDICARE

## 2023-02-13 ENCOUNTER — ANESTHESIA EVENT (OUTPATIENT)
Dept: OPERATING ROOM | Age: 88
End: 2023-02-13
Payer: MEDICARE

## 2023-02-13 LAB
ABSOLUTE EOS #: 0.11 K/UL (ref 0–0.44)
ABSOLUTE IMMATURE GRANULOCYTE: 0.05 K/UL (ref 0–0.3)
ABSOLUTE LYMPH #: 0.99 K/UL (ref 1.1–3.7)
ABSOLUTE MONO #: 0.96 K/UL (ref 0.1–1.2)
ANION GAP SERPL CALCULATED.3IONS-SCNC: 6 MMOL/L (ref 9–17)
BASOPHILS # BLD: 0 % (ref 0–2)
BASOPHILS ABSOLUTE: 0.03 K/UL (ref 0–0.2)
BUN SERPL-MCNC: 11 MG/DL (ref 8–23)
BUN/CREAT BLD: 27 (ref 9–20)
CALCIUM SERPL-MCNC: 9 MG/DL (ref 8.6–10.4)
CHLORIDE SERPL-SCNC: 110 MMOL/L (ref 98–107)
CO2 SERPL-SCNC: 23 MMOL/L (ref 20–31)
CREAT SERPL-MCNC: 0.41 MG/DL (ref 0.5–0.9)
EOSINOPHILS RELATIVE PERCENT: 1 % (ref 1–4)
GFR SERPL CREATININE-BSD FRML MDRD: >60 ML/MIN/1.73M2
GLUCOSE BLD-MCNC: 106 MG/DL (ref 65–105)
GLUCOSE SERPL-MCNC: 147 MG/DL (ref 70–99)
HCT VFR BLD AUTO: 29.6 % (ref 36.3–47.1)
HGB BLD-MCNC: 9.3 G/DL (ref 11.9–15.1)
IMMATURE GRANULOCYTES: 1 %
LYMPHOCYTES # BLD: 12 % (ref 24–43)
MAGNESIUM SERPL-MCNC: 1.8 MG/DL (ref 1.6–2.6)
MCH RBC QN AUTO: 29.6 PG (ref 25.2–33.5)
MCHC RBC AUTO-ENTMCNC: 31.4 G/DL (ref 28.4–34.8)
MCV RBC AUTO: 94.3 FL (ref 82.6–102.9)
MONOCYTES # BLD: 12 % (ref 3–12)
NRBC AUTOMATED: 0 PER 100 WBC
PDW BLD-RTO: 12.9 % (ref 11.8–14.4)
PLATELET # BLD AUTO: 294 K/UL (ref 138–453)
PMV BLD AUTO: 9.4 FL (ref 8.1–13.5)
POTASSIUM SERPL-SCNC: 4.3 MMOL/L (ref 3.7–5.3)
RBC # BLD: 3.14 M/UL (ref 3.95–5.11)
SEG NEUTROPHILS: 74 % (ref 36–65)
SEGMENTED NEUTROPHILS ABSOLUTE COUNT: 5.88 K/UL (ref 1.5–8.1)
SODIUM SERPL-SCNC: 139 MMOL/L (ref 135–144)
WBC # BLD AUTO: 8 K/UL (ref 3.5–11.3)

## 2023-02-13 PROCEDURE — 7100000000 HC PACU RECOVERY - FIRST 15 MIN: Performed by: SURGERY

## 2023-02-13 PROCEDURE — 97530 THERAPEUTIC ACTIVITIES: CPT

## 2023-02-13 PROCEDURE — 6370000000 HC RX 637 (ALT 250 FOR IP): Performed by: SURGERY

## 2023-02-13 PROCEDURE — 7100000001 HC PACU RECOVERY - ADDTL 15 MIN: Performed by: SURGERY

## 2023-02-13 PROCEDURE — 97110 THERAPEUTIC EXERCISES: CPT

## 2023-02-13 PROCEDURE — 6360000002 HC RX W HCPCS: Performed by: SURGERY

## 2023-02-13 PROCEDURE — 97535 SELF CARE MNGMENT TRAINING: CPT

## 2023-02-13 PROCEDURE — 6370000000 HC RX 637 (ALT 250 FOR IP): Performed by: INTERNAL MEDICINE

## 2023-02-13 PROCEDURE — 2580000003 HC RX 258: Performed by: NURSE PRACTITIONER

## 2023-02-13 PROCEDURE — 2580000003 HC RX 258: Performed by: SURGERY

## 2023-02-13 PROCEDURE — 87186 SC STD MICRODIL/AGAR DIL: CPT

## 2023-02-13 PROCEDURE — 2500000003 HC RX 250 WO HCPCS: Performed by: SURGERY

## 2023-02-13 PROCEDURE — 85025 COMPLETE CBC W/AUTO DIFF WBC: CPT

## 2023-02-13 PROCEDURE — 2500000003 HC RX 250 WO HCPCS: Performed by: SPECIALIST

## 2023-02-13 PROCEDURE — 87077 CULTURE AEROBIC IDENTIFY: CPT

## 2023-02-13 PROCEDURE — 2709999900 HC NON-CHARGEABLE SUPPLY: Performed by: SURGERY

## 2023-02-13 PROCEDURE — 3600000012 HC SURGERY LEVEL 2 ADDTL 15MIN: Performed by: SURGERY

## 2023-02-13 PROCEDURE — 0KBN0ZZ EXCISION OF RIGHT HIP MUSCLE, OPEN APPROACH: ICD-10-PCS | Performed by: ORTHOPAEDIC SURGERY

## 2023-02-13 PROCEDURE — 36415 COLL VENOUS BLD VENIPUNCTURE: CPT

## 2023-02-13 PROCEDURE — 1200000000 HC SEMI PRIVATE

## 2023-02-13 PROCEDURE — 83735 ASSAY OF MAGNESIUM: CPT

## 2023-02-13 PROCEDURE — 6370000000 HC RX 637 (ALT 250 FOR IP): Performed by: HOSPITALIST

## 2023-02-13 PROCEDURE — 82947 ASSAY GLUCOSE BLOOD QUANT: CPT

## 2023-02-13 PROCEDURE — 0KBP0ZZ EXCISION OF LEFT HIP MUSCLE, OPEN APPROACH: ICD-10-PCS | Performed by: ORTHOPAEDIC SURGERY

## 2023-02-13 PROCEDURE — 87205 SMEAR GRAM STAIN: CPT

## 2023-02-13 PROCEDURE — 87176 TISSUE HOMOGENIZATION CULTR: CPT

## 2023-02-13 PROCEDURE — 3700000001 HC ADD 15 MINUTES (ANESTHESIA): Performed by: SURGERY

## 2023-02-13 PROCEDURE — 6360000002 HC RX W HCPCS: Performed by: SPECIALIST

## 2023-02-13 PROCEDURE — 80048 BASIC METABOLIC PNL TOTAL CA: CPT

## 2023-02-13 PROCEDURE — 99232 SBSQ HOSP IP/OBS MODERATE 35: CPT | Performed by: INTERNAL MEDICINE

## 2023-02-13 PROCEDURE — 87070 CULTURE OTHR SPECIMN AEROBIC: CPT

## 2023-02-13 PROCEDURE — 3600000002 HC SURGERY LEVEL 2 BASE: Performed by: SURGERY

## 2023-02-13 PROCEDURE — 2580000003 HC RX 258: Performed by: SPECIALIST

## 2023-02-13 PROCEDURE — 87075 CULTR BACTERIA EXCEPT BLOOD: CPT

## 2023-02-13 PROCEDURE — 3700000000 HC ANESTHESIA ATTENDED CARE: Performed by: SURGERY

## 2023-02-13 RX ORDER — LIDOCAINE HYDROCHLORIDE 20 MG/ML
INJECTION, SOLUTION EPIDURAL; INFILTRATION; INTRACAUDAL; PERINEURAL PRN
Status: DISCONTINUED | OUTPATIENT
Start: 2023-02-13 | End: 2023-02-13 | Stop reason: SDUPTHER

## 2023-02-13 RX ORDER — SODIUM CHLORIDE 9 MG/ML
INJECTION, SOLUTION INTRAVENOUS PRN
Status: CANCELLED | OUTPATIENT
Start: 2023-02-13

## 2023-02-13 RX ORDER — PHENYLEPHRINE HCL IN 0.9% NACL 1 MG/10 ML
SYRINGE (ML) INTRAVENOUS PRN
Status: DISCONTINUED | OUTPATIENT
Start: 2023-02-13 | End: 2023-02-13 | Stop reason: SDUPTHER

## 2023-02-13 RX ORDER — FENTANYL CITRATE 50 UG/ML
25 INJECTION, SOLUTION INTRAMUSCULAR; INTRAVENOUS EVERY 5 MIN PRN
Status: CANCELLED | OUTPATIENT
Start: 2023-02-13

## 2023-02-13 RX ORDER — SODIUM CHLORIDE 9 MG/ML
INJECTION, SOLUTION INTRAVENOUS CONTINUOUS PRN
Status: DISCONTINUED | OUTPATIENT
Start: 2023-02-13 | End: 2023-02-13 | Stop reason: SDUPTHER

## 2023-02-13 RX ORDER — HYDROMORPHONE HYDROCHLORIDE 1 MG/ML
0.5 INJECTION, SOLUTION INTRAMUSCULAR; INTRAVENOUS; SUBCUTANEOUS EVERY 5 MIN PRN
Status: CANCELLED | OUTPATIENT
Start: 2023-02-13

## 2023-02-13 RX ORDER — SODIUM CHLORIDE 0.9 % (FLUSH) 0.9 %
5-40 SYRINGE (ML) INJECTION EVERY 12 HOURS SCHEDULED
Status: CANCELLED | OUTPATIENT
Start: 2023-02-13

## 2023-02-13 RX ORDER — PROPOFOL 10 MG/ML
INJECTION, EMULSION INTRAVENOUS PRN
Status: DISCONTINUED | OUTPATIENT
Start: 2023-02-13 | End: 2023-02-13 | Stop reason: SDUPTHER

## 2023-02-13 RX ORDER — ONDANSETRON 2 MG/ML
4 INJECTION INTRAMUSCULAR; INTRAVENOUS
Status: CANCELLED | OUTPATIENT
Start: 2023-02-13 | End: 2023-02-14

## 2023-02-13 RX ORDER — SODIUM CHLORIDE 0.9 % (FLUSH) 0.9 %
5-40 SYRINGE (ML) INJECTION PRN
Status: CANCELLED | OUTPATIENT
Start: 2023-02-13

## 2023-02-13 RX ORDER — FENTANYL CITRATE 50 UG/ML
INJECTION, SOLUTION INTRAMUSCULAR; INTRAVENOUS PRN
Status: DISCONTINUED | OUTPATIENT
Start: 2023-02-13 | End: 2023-02-13 | Stop reason: SDUPTHER

## 2023-02-13 RX ORDER — ROCURONIUM BROMIDE 10 MG/ML
INJECTION, SOLUTION INTRAVENOUS PRN
Status: DISCONTINUED | OUTPATIENT
Start: 2023-02-13 | End: 2023-02-13 | Stop reason: SDUPTHER

## 2023-02-13 RX ADMIN — ANTI-FUNGAL POWDER MICONAZOLE NITRATE TALC FREE: 1.42 POWDER TOPICAL at 19:35

## 2023-02-13 RX ADMIN — LEVOTHYROXINE SODIUM 112 MCG: 112 TABLET ORAL at 06:37

## 2023-02-13 RX ADMIN — SODIUM CHLORIDE: 9 INJECTION, SOLUTION INTRAVENOUS at 12:54

## 2023-02-13 RX ADMIN — FAMOTIDINE 20 MG: 20 TABLET, FILM COATED ORAL at 15:09

## 2023-02-13 RX ADMIN — DAKIN'S SOLUTION 0.125% (QUARTER STRENGTH): 0.12 SOLUTION at 09:02

## 2023-02-13 RX ADMIN — ROCURONIUM BROMIDE 25 MG: 10 SOLUTION INTRAVENOUS at 11:56

## 2023-02-13 RX ADMIN — LISINOPRIL 20 MG: 20 TABLET ORAL at 15:08

## 2023-02-13 RX ADMIN — Medication 200 MCG: at 12:33

## 2023-02-13 RX ADMIN — METRONIDAZOLE 500 MG: 500 TABLET ORAL at 15:08

## 2023-02-13 RX ADMIN — LIDOCAINE HYDROCHLORIDE 100 MG: 20 INJECTION, SOLUTION EPIDURAL; INFILTRATION; INTRACAUDAL at 11:56

## 2023-02-13 RX ADMIN — FENTANYL CITRATE 25 MCG: 50 INJECTION INTRAMUSCULAR; INTRAVENOUS at 11:56

## 2023-02-13 RX ADMIN — METRONIDAZOLE 500 MG: 500 TABLET ORAL at 06:37

## 2023-02-13 RX ADMIN — METRONIDAZOLE 500 MG: 500 TABLET ORAL at 22:03

## 2023-02-13 RX ADMIN — PROPOFOL 50 MG: 10 INJECTION, EMULSION INTRAVENOUS at 12:20

## 2023-02-13 RX ADMIN — SODIUM CHLORIDE: 9 INJECTION, SOLUTION INTRAVENOUS at 11:50

## 2023-02-13 RX ADMIN — SODIUM CHLORIDE: 9 INJECTION, SOLUTION INTRAVENOUS at 04:52

## 2023-02-13 RX ADMIN — CEFEPIME 2000 MG: 2 INJECTION, POWDER, FOR SOLUTION INTRAVENOUS at 15:20

## 2023-02-13 RX ADMIN — VANCOMYCIN HYDROCHLORIDE 1500 MG: 5 INJECTION, POWDER, LYOPHILIZED, FOR SOLUTION INTRAVENOUS at 22:01

## 2023-02-13 RX ADMIN — SUGAMMADEX 200 MG: 100 INJECTION, SOLUTION INTRAVENOUS at 12:36

## 2023-02-13 RX ADMIN — FENTANYL CITRATE 25 MCG: 50 INJECTION INTRAMUSCULAR; INTRAVENOUS at 12:19

## 2023-02-13 RX ADMIN — PROPOFOL 50 MG: 10 INJECTION, EMULSION INTRAVENOUS at 11:56

## 2023-02-13 ASSESSMENT — LIFESTYLE VARIABLES: SMOKING_STATUS: 0

## 2023-02-13 NOTE — PROGRESS NOTES
Patient was sleeping as writer entered the room (no family members present). Writer provided a silent prayer of continued healing, comfort, and rest; writer also left a prayer card as additional support. Spiritual care will follow up as needed or requested.       02/13/23 1122   Encounter Summary   Service Provided For: Patient   Referral/Consult From: Aileen   Last Encounter  02/13/23  (2/13/2023 Sleeping)   Complexity of Encounter Low   Begin Time 0930   End Time  0935   Total Time Calculated 5 min   Encounter    Type Initial Screen/Assessment   Assessment/Intervention/Outcome   Assessment Other (Comment)  (Sleeping)   Intervention Prayer (assurance of)/Center;Read/Provided Scripture   Outcome Did not respond   Plan and Referrals   Plan/Referrals Continue Support (comment)

## 2023-02-13 NOTE — ANESTHESIA PRE PROCEDURE
Department of Anesthesiology  Preprocedure Note       Name:  Magdy Magaña   Age:  81 y.o.  :  1935                                          MRN:  [id number]         Date:  2023      Surgeon: Gustavo Palomo):  Mateo Carr DO    Procedure: Procedure(s):  BACK WOUND DEBRIDEMENT    Medications prior to admission:   Prior to Admission medications    Medication Sig Start Date End Date Taking? Authorizing Provider   levothyroxine (SYNTHROID) 112 MCG tablet Take 112 mcg by mouth every morning (before breakfast)   Yes Historical Provider, MD   sodium chloride (OCEAN, BABY AYR) 0.65 % nasal spray 1 spray by Nasal route as needed for Congestion   Yes Historical Provider, MD   magnesium cl-calcium carbonate (SLOW-MAG) 71.5-119 MG TBEC tablet Take 1 tablet by mouth daily   Yes Historical Provider, MD   oxyCODONE (ROXICODONE) 5 MG immediate release tablet Take 2.5 mg by mouth every 8 hours as needed for Pain. Yes Historical Provider, MD   collagenase 250 UNIT/GM ointment Apply topically daily TO COCCYX WOUND.    Yes Historical Provider, MD   acetaminophen (TYLENOL) 500 MG tablet Take 500 mg by mouth every 6 hours as needed for Pain or Fever   Yes Historical Provider, MD   fosinopril (MONOPRIL) 20 MG tablet TAKE 1 TABLET BY MOUTH EVERY DAY  Patient taking differently: Take 20 mg by mouth daily TAKE 1 TABLET BY MOUTH EVERY DAY 11/10/19   MUMTAZ Olvera CNP   Multiple Vitamins-Minerals (CENTRUM SILVER PO) Take 1 tablet by mouth daily (before dinner)    Historical Provider, MD       Current medications:    Current Facility-Administered Medications   Medication Dose Route Frequency Provider Last Rate Last Admin    miconazole (MICOTIN) 2 % powder   Topical BID Trevin Ravi MD   Given at 23    metoprolol (LOPRESSOR) injection 5 mg  5 mg IntraVENous Q6H PRN MUMTAZ Zendejas CNP   5 mg at 23    vancomycin (VANCOCIN) 1,500 mg in sodium chloride 0.9 % 250 mL IVPB  1,500 mg IntraVENous Q24H Mendoza Meo, APRN - CNP   Stopped at 02/13/23 0006    metroNIDAZOLE (FLAGYL) tablet 500 mg  500 mg Oral 3 times per day Angi Grey MD   500 mg at 02/13/23 6588    sodium hypochlorite (DAKINS) 0.125 % external solution   Irrigation BID Angi Grey MD   Given at 02/13/23 0902    cefepime (MAXIPIME) 2,000 mg in sodium chloride 0.9 % 50 mL IVPB (mini-bag)  2,000 mg IntraVENous Q24H Angi Grey MD   Stopped at 02/12/23 1545    lisinopril (PRINIVIL;ZESTRIL) tablet 20 mg  20 mg Oral Daily Juan Rocha MD   20 mg at 02/12/23 0827    levothyroxine (SYNTHROID) tablet 112 mcg  112 mcg Oral QAM AC Juan Rocha MD   112 mcg at 02/13/23 2524    oxyCODONE (ROXICODONE) immediate release tablet 2.5 mg  2.5 mg Oral Q8H PRN Juan Rocha MD        0.9 % sodium chloride infusion   IntraVENous Continuous Mendoza Meo, APRN - CNP 75 mL/hr at 02/13/23 0452 New Bag at 02/13/23 0452    sodium chloride flush 0.9 % injection 5-40 mL  5-40 mL IntraVENous 2 times per day Mendoza Meo, APRN - CNP   10 mL at 02/11/23 0913    sodium chloride flush 0.9 % injection 10 mL  10 mL IntraVENous PRN Rashawn Panchal, APRN - CNP        0.9 % sodium chloride infusion   IntraVENous PRN Mendoza Meo, APRN - CNP        potassium chloride (KLOR-CON M) extended release tablet 40 mEq  40 mEq Oral PRN Mendoza Meo, APRN - CNP   40 mEq at 02/12/23 5002    Or    potassium bicarb-citric acid (EFFER-K) effervescent tablet 40 mEq  40 mEq Oral PRN Mendoza Meo, APRN - CNP        Or    potassium chloride 10 mEq/100 mL IVPB (Peripheral Line)  10 mEq IntraVENous PRN Mendoza Meo, APRN - CNP        magnesium sulfate 1000 mg in dextrose 5% 100 mL IVPB  1,000 mg IntraVENous PRN Mendoza Meo, APRN - CNP   Stopped at 02/12/23 1819    enoxaparin (LOVENOX) injection 40 mg  40 mg SubCUTAneous Daily MUMTAZ Russell CNP   40 mg at 02/12/23 0826    ondansetron (ZOFRAN-ODT) disintegrating tablet 4 mg  4 mg Oral Q8H PRN Atria Pheobe Lapidus, APRN - CNP        Or    ondansetron [facility] injection 4 mg  4 mg IntraVENous Q6H PRN [place], APRN - CNP        magnesium hydroxide (MILK OF MAGNESIA) 400 MG/5ML suspension 30 mL  30 mL Oral Daily PRN [place], APRN - CNP        famotidine (PEPCID) tablet 20 mg  20 mg Oral Daily [place], APRN - CNP   20 mg at 02/12/23 0826    acetaminophen (TYLENOL) tablet 650 mg  650 mg Oral Q6H PRN [place], APRN - CNP   650 mg at 02/12/23 2021    Or    acetaminophen (TYLENOL) suppository 650 mg  650 mg Rectal Q6H PRN [place], APRN - CNP        vancomycin Pearl Greaser) intermittent dosing (placeholder)   Other RX Placeholder [place], APRN - CNP           Allergies:     Allergies   Allergen Reactions    Gluten Meal     Penicillins        Problem List:    Patient Active Problem List   Diagnosis Code    Mixed hyperlipidemia E78.2    Essential hypertension I10    Hypothyroidism E03.9    Vitamin D deficiency E55.9    Hallucinations, visual R44.1    Vitamin B12 deficiency E53.8    History of gallstones Z87.19    COPD, mild ([facility] [address] J44.9    Basal cell carcinoma of back C44.519    Arthritis of left shoulder region M19.012    Gastroesophageal reflux disease without esophagitis K21.9    Celiac disease K90.0    Duodenitis K29.80    Type 2 diabetes mellitus without complication, without long-term current use of insulin (HCC) E11.9    TIA (transient ischemic attack) G45.9    Dysarthria R47.1    Cellulitis L03.90    Pressure ulcer of coccygeal region, stage 4 ([facility]) L89.154       Past Medical History:        Diagnosis Date    Anemia     Gastroesophageal reflux disease without esophagitis 12/20/2017    Hard of hearing     Hyperlipidemia     Hypertension     Hypothyroidism     TIA (transient ischemic attack)        Past Surgical History:        Procedure Laterality Date    CARPAL TUNNEL RELEASE Right     TONSILLECTOMY      age 16       Social History:    Social History     Tobacco Use    Smoking status: Former     Types: Cigarettes     Quit date: 1970     Years since quittin.6    Smokeless tobacco: Never   Substance Use Topics    Alcohol use: No     Alcohol/week: 0.0 standard drinks                                Counseling given: Not Answered      Vital Signs (Current):   Vitals:    23 0026 23 0308 23 0406 23 0719   BP: (!) 157/59  (!) 152/56 (!) 142/50   Pulse: 88  97 87   Resp: 18  18 16   Temp: 98.3 °F (36.8 °C)  97.8 °F (36.6 °C) 97.8 °F (36.6 °C)   TempSrc:    Oral   SpO2: 95%  97% 97%   Weight:  117 lb (53.1 kg)     Height:                                                  BP Readings from Last 3 Encounters:   23 (!) 142/50   10/06/22 (!) 173/81   21 (!) 155/63       NPO Status:                                                                                 BMI:   Wt Readings from Last 3 Encounters:   23 117 lb (53.1 kg)   10/06/22 128 lb (58.1 kg)   21 128 lb (58.1 kg)     Body mass index is 18.88 kg/m². CBC:   Lab Results   Component Value Date/Time    WBC 8.0 2023 06:01 AM    RBC 3.14 2023 06:01 AM    HGB 9.3 2023 06:01 AM    HCT 29.6 2023 06:01 AM    MCV 94.3 2023 06:01 AM    RDW 12.9 2023 06:01 AM     2023 06:01 AM       CMP:   Lab Results   Component Value Date/Time     2023 06:01 AM    K 4.3 2023 06:01 AM     2023 06:01 AM    CO2 23 2023 06:01 AM    BUN 11 2023 06:01 AM    CREATININE 0.41 2023 06:01 AM    GFRAA >60 2019 06:20 PM    LABGLOM >60 2023 06:01 AM    GLUCOSE 147 2023 06:01 AM    PROT 7.0 2019 03:18 PM    CALCIUM 9.0 2023 06:01 AM    BILITOT 0.17 2019 03:18 PM    ALKPHOS 60 2019 03:18 PM    AST 18 2019 03:18 PM    ALT 20 2019 03:18 PM       POC Tests: No results for input(s): POCGLU, POCNA, POCK, POCCL, POCBUN, POCHEMO, POCHCT in the last 72 hours.     Coags:   Lab Results   Component Value Date/Time    PROTIME 15.0 02/10/2023 05:34 AM    INR 1.2 02/10/2023 05:34 AM    APTT 24.6 10/06/2022 11:35 AM       HCG (If Applicable): No results found for: PREGTESTUR, PREGSERUM, HCG, HCGQUANT     ABGs: No results found for: PHART, PO2ART, EHZ8ISI, JAV2VSN, BEART, G1BFYAEI     Type & Screen (If Applicable):  No results found for: LABABO, LABRH    Drug/Infectious Status (If Applicable):  No results found for: HIV, HEPCAB    COVID-19 Screening (If Applicable): No results found for: COVID19        Anesthesia Evaluation   no history of anesthetic complications:   Airway: Mallampati: Unable to assess / NA  TM distance: <3 FB     Mouth opening: < 3 FB   Dental:          Pulmonary:normal exam    (+) COPD:      (-) not a current smoker (former)                           Cardiovascular:  Exercise tolerance: poor (<4 METS),   (+) hypertension:, hyperlipidemia          Rate: abnormal                    Neuro/Psych:   (+) TIA, psychiatric history (dementia):            GI/Hepatic/Renal:   (+) GERD:,           Endo/Other:    (+) Diabetes, hypothyroidism, blood dyscrasia: anemia, arthritis: OA., malignancy/cancer (skin). Abdominal:             Vascular: Other Findings: Placement Date: 12/30/21; Placement Time: 7075 (created via procedure documentation); Mask Ventilation: Ventilated by mask; Type: Cuffed, Inflated; Tube Size: 7 mm; Laryngoscope: Mac; Blade Size: 4; Location: Oral; Grade View: 1; Insertion Attempts: 1; Placement Verification: Auscultation, End tidal CO2, Symmetrical chest wall movement; Secured at: 21 cm; Airway Comment: Dentition and oral mucosa as pre op; Removal Date: 12/30/21;  Removal Time: 0844     Anemia    Anorexia    Arthritis of left shoulder region 5/23/2017    Basal cell carcinoma of back 5/23/2017    Bipolar disorder (CMS-HCC)    Celiac disease    Celiac disease    Change in bowel habits    COPD, mild (CMS-HCC) 5/23/2017    Duodenitis 11/20/2017    Elevated anti-tissue transglutaminase (tTG) IgA level    Elevated liver enzymes    Esophageal reflux    Essential hypertension 3/21/2016    Gastroesophageal reflux disease without esophagitis 12/20/2017    Hallucination, visual    Hallucinations    Hallucinations, visual 5/20/2016    History of gallstones 5/23/2017    Hyperlipidemia    Hypertension    Hypothyroid    Hypothyroidism 3/21/2016    Liver function study, abnormal    Migraine headache    Mixed hyperlipidemia 3/21/2016    Nontoxic simple goitre    Osteoarthritis    Polyneuropathy    Rosacea    Type 2 diabetes mellitus (CMS-HCC)    Vitamin B12 deficiency 5/20/2016    Vitamin D deficiency 3/23/2016    Weight loss, unintentional   Pt denies COPD    Patient Active Problem List   Diagnosis Date Noted    Age-related osteoporosis with current pathological fracture 12/29/2021    Closed nondisplaced midcervical fracture of left femur (CMS-HCC) 12/28/2021    Celiac disease 02/02/2018    Gastroesophageal reflux disease without esophagitis 12/20/2017    Duodenitis 11/20/2017    Arthritis of left shoulder region 05/23/2017    Basal cell carcinoma of back 05/23/2017    COPD, mild (CMS-HCC) 05/23/2017    History of gallstones 05/23/2017    Hallucinations, visual 05/20/2016    Vitamin B12 deficiency 05/20/2016    Vitamin D deficiency 03/23/2016    Essential hypertension 03/21/2016    Hypothyroidism 03/21/2016    Mixed hyperlipidemia 03/21/2016              Anesthesia Plan      general     ASA 3       Induction: intravenous. MIPS: Postoperative opioids intended and Prophylactic antiemetics administered. Anesthetic plan and risks discussed with child/children.         Attending anesthesiologist reviewed and agrees with Preprocedure content                Stan Sargent MD   2/13/2023

## 2023-02-13 NOTE — CARE COORDINATION
Social work: Spoke to Maxi Huff regarding pt's wound(vac, daily dressing change) and PT/OT needs. Christal Cavazos feels patient needs SNF level of care prior to AL placement. SHANNON is contracted with the following facilities:    Shahla Gamez 25: [place]: [address] [facility] [address]: [address]: [address] [facility] [place]: [address] [facility] [address]: [phone]    PRISCILA will reach out to all facilities and send referrals. # for Christal Cavazos: [phone]  PRISCILA also faxed updates to [facility], fax: [phone]. Christal Cavazos will contact son and update on plans.

## 2023-02-13 NOTE — PROGRESS NOTES
[address] [facility] Pharmacokinetic Monitoring Service - Vancomycin    Consulting Provider: Valente Lee CNP   Indication: cellulitis  Target Concentration: Goal trough of 10-15 mg/L and AUC/JANET <500 mg*hr/L  Day of Therapy: 5  Additional Antimicrobials: cefepime/flagyl    Pertinent Laboratory Values: Wt Readings from Last 1 Encounters:   02/12/23 119 lb 3.2 oz (54.1 kg)     Temp Readings from Last 1 Encounters:   02/11/23 98.6 °F (37 °C) (Axillary)     Estimated Creatinine Clearance: 63 mL/min (based on SCr of 0.55 mg/dL).   Recent Labs     02/12/23  0556 02/13/23  0601   CREATININE 0.55 0.41*   WBC 7.7 8.0       Pertinent Cultures:  Culture Date Source Results   2/29/23 Blood x 2 No growth      Recent vancomycin administrations                     vancomycin (VANCOCIN) 1,500 mg in sodium chloride 0.9 % 250 mL IVPB (mg) 1,500 mg New Bag 02/11/23 2308    vancomycin (VANCOCIN) 1,250 mg in sodium chloride 0.9 % 250 mL IVPB (mg) 1,250 mg New Bag 02/10/23 2142    vancomycin (VANCOCIN) 1,500 mg in sodium chloride 0.9 % 250 mL IVPB (mg) 1,500 mg New Bag 02/09/23 2157                    Assessment:  Date/Time Current Dose Concentration Timing of Concentration (h) AUC   2/12/23 @0556 1500mg q24hr 22.2 7hr 499   Note: Serum concentrations collected for AUC dosing may appear elevated if collected in close proximity to the dose administered, this is not necessarily an indication of toxicity    Plan:  Current dosing regimen is therapeutic  Continue current dose  Pharmacy will continue to monitor patient and adjust therapy as indicated    Thank you for the consult,  CHARIS Carvajal [facility]  2/13/2023 9:23 AM

## 2023-02-13 NOTE — PLAN OF CARE
Problem: Safety - Adult  Goal: Free from fall injury  Outcome: Progressing     Problem: Neurosensory - Adult  Goal: Achieves stable or improved neurological status  Outcome: Progressing     Problem: Cardiovascular - Adult  Goal: Maintains optimal cardiac output and hemodynamic stability  Outcome: Progressing  Goal: Absence of cardiac dysrhythmias or at baseline  Outcome: Progressing     Problem: Skin/Tissue Integrity - Adult  Goal: Oral mucous membranes remain intact  Outcome: Progressing     Problem: Genitourinary - Adult  Goal: Urinary catheter remains patent  Outcome: Progressing     Problem: Pain  Goal: Verbalizes/displays adequate comfort level or baseline comfort level  Outcome: Progressing

## 2023-02-13 NOTE — PROGRESS NOTES
Physical Therapy  Facility/Department: [facility] OR  Rehabilitation Physical Therapy Treatment Note    NAME: Chris Cancer  : 1935 (31 y.o.)  MRN: [id number]  CODE STATUS: Full Code    Date of Service: 23   Pt currently functioning below baseline. Recommend daily inpatient skilled therapy at time of discharge to maximize long term outcomes and prevent re-admission. Please refer to AM-PAC score for current level of function. Restrictions:  Restrictions/Precautions: General Precautions; Fall Risk  Position Activity Restriction  Other position/activity restrictions: Up with assist, telemetry, Heels off bed at all times, LUE IV, purewick, VERY [place], h/o dementia per chart, ALARMS     SUBJECTIVE  Subjective  Subjective: Patient asleep in bed upon therapists arrival. Patient agreeable to PT treatment upon arousal. TIERRA Espino states patient is appropriate for PT treatement this date. OBJECTIVE  Cognition  Overall Cognitive Status: Exceptions  Arousal/Alertness: Delayed responses to stimuli  Following Commands: Follows one step commands with repetition; Follows one step commands with increased time; Inconsistently follows commands  Attention Span: Difficulty attending to directions  Memory: Decreased recall of recent events;Decreased recall of biographical Information  Safety Judgement: Decreased awareness of need for assistance;Decreased awareness of need for safety  Problem Solving: Decreased awareness of errors;Assistance required to identify errors made;Assistance required to correct errors made  Insights: Decreased awareness of deficits  Initiation: Requires cues for all  Sequencing: Requires cues for all  Orientation  Orientation Level: Oriented to person;Disoriented to place; Disoriented to time;Disoriented to situation    Functional Mobility  Bed Mobility  Overall Assistance Level: Maximum Assistance; Requires x 2 Assistance  Additional Factors: Set-up; Verbal cues; Increased time to complete; Head of bed raised; With handrails  Roll Left  Assistance Level: Maximum assistance; Requires x 2 assistance  Roll Right  Assistance Level: Maximum assistance; Requires x 2 assistance  Skilled Clinical Factors: multiple rolls performed to promote bed mobility tasks and promote pressure relief in conjunction with repositioning patient and bed linen. Sit to Supine  Assistance Level: Maximum assistance; Requires x 2 assistance  Skilled Clinical Factors: Patient given vc's for hand placement and progression techniques. Patient unable to self promote to [facility] this date. Supine to Sit  Assistance Level: Maximum assistance; Requires x 2 assistance  Skilled Clinical Factors: vc's for hand placement and for progression. Patient education on technique and momentum skills. Patient unable to carry over techniques this date. Scooting  Assistance Level: Maximum assistance; Requires x 2 assistance  Skilled Clinical Factors: vc's for hand placement and to scoot all the way out and to place feet flat on the floor for increased stability and support. Patient requires verbal and tactile cues to keep feet flat on the floor and not cross them at the ankle. Patient sat EOB x15 minutes to maximize engagement, promote focus on task at hand, and to maximize core control stability and endurance in seated posture. Balance  Sitting Balance: Minimal assistance  Standing Balance  Time: 15 minutes  Activity: seated EOB  Comments: working on core control, postural alignment and tolerance to activity. Neuromuscular Education  NDT Treatment: Sitting; Lower extremity      PT Exercises  A/AROM Exercises: supine SHARONDA LE exercises AP, heel slides, glute sets and single leg raises (hip flexion) patient with fair performance of exercises requries frequent rest breaks and cue for engagement to activity.       ASSESSMENT/PROGRESS TOWARDS GOALS     AM-[facility] Inpatient Mobility Raw Score  8   AM-[facility] Inpatient T-Scale Score  28.52   Mobility Inpatient CMS 0-100% Score 86.62   Mobility Inpatient CMS G-Code Modifier  CM       Assessment  Assessment: Patient with global deconditioning and decreased aerobic capacity. Patient would benefit from continued skilled PT treatement to maximize return to PLOF. Activity Tolerance: Patient limited by fatigue;Treatment limited secondary to decreased cognition  Discharge Recommendations: Patient would benefit from continued therapy after discharge;24 hour supervision or assist    Goals  Short Term Goals  Time Frame for Short Term Goals: 12 visits  Short Term Goal 1: Inc bed mobility to [facility]  Short Term Goal 2: Pt able to transfer from bed to chair with MOD assist using safest device  Short Term Goal 3: Pt to tolerate sitting EOB up to 15-20 minutes with UB/LB support to improve core stability & repositioning for pressure relief & prevent sedentary complications  Short Term Goal 4: Pt able to tolerate 30 min of activity to include ex, NMR & functional mobility with device to facilitate better activity tolerance and prevent further sedentary complications    PLAN OF CARE/SAFETY  Physcial Therapy Plan  General Plan: 3-5 times per week  Current Treatment Recommendations: Strengthening;ROM;Balance training;Functional mobility training; Endurance training;Cognitive reorientation; Safety education & training;Patient/Caregiver education & training;Positioning  Safety Devices  Type of Devices: All roc prominences offloaded; All fall risk precautions in place; Bed alarm in place;Call light within reach; Heels elevated for pressure relief;Left in bed    EDUCATION  Education  Education Given To: Patient  Education Provided: Safety  Education Provided Comments: education on the importance of repositioning to prevent secondary complications.   Education Method: Verbal  Barriers to Learning: Hearing  Education Outcome: Verbalized understanding;Continued education needed        Therapy Time   Individual Concurrent Group Co-treatment   Time In [address] [address] [place] [address] 53   Time Out 0958     56   Minutes 8     28     Co-treatment with OT warranted secondary to decreased safety and independence requiring 2 skilled therapy professionals to address individual discipline's goals. PT addressing postural control in sitting.      Will Holcomb PTA, 02/13/23 at 12:06 PM

## 2023-02-13 NOTE — ANESTHESIA POSTPROCEDURE EVALUATION
Department of Anesthesiology  Postprocedure Note    Patient: Anton Kenney  MRN: [id number]  YOB: 1935  Date of evaluation: 2/13/2023      Procedure Summary     Date: 02/13/23 Room / Location: [facility] 03 / [facility] - INPATIENT    Anesthesia Start: 8436 Anesthesia Stop: 5107    Procedure: BACK WOUND DEBRIDEMENT (Buttocks) Diagnosis:       Wound of sacral region, sequela      (Wound of sacral region, sequela [S31.000S])    Surgeons: Aimee Rogers DO Responsible Provider: Vibha Harrison MD    Anesthesia Type: general ASA Status: 3          Anesthesia Type: No value filed.     Rom Phase I: Rom Score: 8    Rom Phase II:        Anesthesia Post Evaluation    Patient location during evaluation: PACU  Patient participation: complete - patient participated  Level of consciousness: awake  Airway patency: patent  Nausea & Vomiting: no nausea  Complications: no  Cardiovascular status: blood pressure returned to baseline  Respiratory status: acceptable  Hydration status: euvolemic  Comments: Multimodal analgesia pain management as indicated by procedure  Multimodal analgesia pain management approach

## 2023-02-13 NOTE — PROGRESS NOTES
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 Infection Control Recommendations:   Universal precautions    Discharge Planning:   Estimated Length of IV antimicrobials: To be determined  Patient will need Midline Catheter Insertion/ PICC line Insertion: No  Patient will need: Home IV , [place],  SNF,  LTAC: Undetermined  Patient willneed outpatient wound care: No    Medical Decision making / Summary of Stay:   Davida Ford is a 81y.o.-year-old female who was initially admitted on 2/9/2023. Vincent Mason has history of dementia, hard of hearing, hypertension, hyperlipidemia, anemia, and GERD. She resides at [address] and it is my understanding that in December 2022 she had COVID-19 virus infection and since that time has not really recovered in terms of her ADLs and being herself. She has been more sedentary and it is my understanding that a few weeks ago she was found to have a skin tear by her gluteal area. This has subsequently progressed and when the patient was seen by the wound care nurse there was concern for infection and the patient was sent into the emergency department for further evaluation. The patient has been admitted for an infected gluteal ulceration and I was asked to evaluate and help with antibiotic choice.      The patient at this point in time is nonverbal, not really able to give me any history and the history it obtained from reviewing the chart and discussing the care with the nurse. Current evaluation:2023    BP (!) 142/50   Pulse 87   Temp 97.8 °F (36.6 °C) (Oral)   Resp 16   Ht 5' 6\" (1.676 m)   Wt 117 lb (53.1 kg)   SpO2 97%   BMI 18.88 kg/m²     Temperature Range: Temp: 97.8 °F (36.6 °C) Temp  Av °F (36.7 °C)  Min: 97.5 °F (36.4 °C)  Max: 98.3 °F (36.8 °C)  The patient is seen and evaluated bedside and is awake and alert does answer to her name  She does not report any complaints. Review of Systems   Unable to perform ROS: Dementia     Physical Examination :     Physical Exam  Constitutional:       Appearance: She is well-developed. She is cachectic. HENT:      Head: Normocephalic and atraumatic. Mouth/Throat:      Mouth: Mucous membranes are dry. Cardiovascular:      Rate and Rhythm: Regular rhythm. Heart sounds: Normal heart sounds. Pulmonary:      Effort: Pulmonary effort is normal.      Breath sounds: Normal breath sounds. Abdominal:      General: Bowel sounds are normal.      Palpations: Abdomen is soft. Musculoskeletal:      Cervical back: Normal range of motion and neck supple. Skin:     General: Skin is warm and dry. Comments: The gluteal wound dressing was not removed   Neurological:      Mental Status: She is alert and oriented to person, place, and time.        Laboratory data:   I have independently reviewed the followinglabs:  CBC with Differential:   Recent Labs     23  0556 23  0601   WBC 7.7 8.0   HGB 9.5* 9.3*   HCT 30.5* 29.6*    294   LYMPHOPCT 13* 12*   MONOPCT 13* 12       BMP:   Recent Labs     23  0556 23  0601    139   K 3.4* 4.3   * 110*   CO2 24 23   BUN 11 11   CREATININE 0.55 0.41*   MG 1.2* 1.8       Hepatic Function Panel:   Recent Labs     23  0849   LABALBU 2.7*           No results found for: PROCAL  Lab Results   Component Value Date/Time    CRP 73.1 02/09/2023 06:25 PM     Lab Results   Component Value Date    SEDRATE 82 (H) 02/09/2023     No results found for: DDIMER  No results found for: FERRITIN  No results found for: LDH  No results found for: FIBRINOGEN    No results found for requested labs within last 30 days. No results found for: COVID19    No results for input(s): VANCOTROUGH in the last 72 hours. Imaging Studies:   No new imaging    Cultures:     Blood Culture 1 [[id number]] Collected: 02/09/23 1805   Order Status: Completed Specimen: Blood Updated: 02/12/23 2140    Specimen Description . BLOOD    Special Requests RAR 12ML    Culture NO GROWTH 3 DAYS   Culture, Blood 2 [[id number]] Collected: 02/09/23 1820   Order Status: Completed Specimen: Blood Updated: 02/12/23 2140    Specimen Description . BLOOD    Special Requests RFA 12ML    Culture NO GROWTH 3 DAYS   Culture, Anaerobic and Aerobic [[id number]] (Abnormal) Collected: 02/09/23 1809   Order Status: Completed Specimen: Coccyx Updated: 02/11/23 1202    Specimen Description . COCCYX    Direct Exam FEW NEUTROPHILS Abnormal      MIXED BACTERIAL MORPHOTYPES SEEN ON GRAM STAIN.  Abnormal     Culture MULTIPLE SPECIES OF AEROBIC AND ANAEROBIC ENTERIC WIL     Medications:      miconazole   Topical BID    vancomycin  1,500 mg IntraVENous Q24H    metroNIDAZOLE  500 mg Oral 3 times per day    sodium hypochlorite   Irrigation BID    cefepime  2,000 mg IntraVENous Q24H    lisinopril  20 mg Oral Daily    levothyroxine  112 mcg Oral QAM AC    sodium chloride flush  5-40 mL IntraVENous 2 times per day    enoxaparin  40 mg SubCUTAneous Daily    famotidine  20 mg Oral Daily    vancomycin (VANCOCIN) intermittent dosing (placeholder)   Other RX Placeholder       Electronically signed by Tamiko Harrell MD on 2/13/2023 at 10:05 AM      [facility]  Tamiko Harrell MD  [employer] messaging  OFFICE: [phone]    Thank you for allowing us to participate in the care of this patient. Please call with questions. This note is created with the assistance of a speech recognition program.  While intending to generate a document that actually reflects the content of the visit, the document can still have some errors including those of syntax and sound a like substitutions which may escape proof reading. In such instances, actual meaning can be extrapolated by contextual diversion.

## 2023-02-13 NOTE — PROGRESS NOTES
Occupational Therapy  Facility/Department: [facility] OR  Rehabilitation Occupational Therapy Daily Treatment Note    Date: 23  Patient Name: Neena Simpson       Room: 22 [facility]  MRN: [id number]  Account: [de-identified]   : 1935  (81 y.o.) Gender: female          Past Medical History:  has a past medical history of Anemia, Gastroesophageal reflux disease without esophagitis, Hard of hearing, Hyperlipidemia, Hypertension, Hypothyroidism, and TIA (transient ischemic attack). Past Surgical History:   has a past surgical history that includes Tonsillectomy and Carpal tunnel release (Right). Restrictions  Restrictions/Precautions: General Precautions; Fall Risk  Other position/activity restrictions: Up with assist, telemetry, Heels off bed at all times, LUE IV, anneck, VERY [place], h/o dementia per chart, ALARMS  Required Braces or Orthoses?: No    Subjective  Subjective: Pt. supine in bed and agreeable for treatment. Restrictions/Precautions: General Precautions; Fall Risk   Objective     Cognition  Overall Cognitive Status: Exceptions  Arousal/Alertness: Delayed responses to stimuli  Following Commands: Follows one step commands with repetition; Follows one step commands with increased time; Inconsistently follows commands  Attention Span: Difficulty attending to directions  Memory: Decreased recall of recent events;Decreased recall of biographical Information  Safety Judgement: Decreased awareness of need for assistance;Decreased awareness of need for safety  Problem Solving: Decreased awareness of errors;Assistance required to identify errors made;Assistance required to correct errors made  Insights: Decreased awareness of deficits  Initiation: Requires cues for all  Sequencing: Requires cues for all  Cognition Comment: Pt. very [place] and required constant cues to orient to task at hand. Orientation  Orientation Level: Oriented to person;Disoriented to place; Disoriented to time;Disoriented to situation ADL  Grooming/Oral Hygiene  Assistance Level: Minimal assistance;Set-up  Skilled Clinical Factors: Simple grooming min assist while supine in bed. Upper Extremity Bathing  Assistance Level: Maximum assistance;Set-up  Skilled Clinical Factors: Max assist to wash chest and underarms. Lower Extremity Bathing  Skilled Clinical Factors: Not tested d/t brief just changed with pericare completed by PCT. Upper Extremity Dressing  Assistance Level: Maximum assistance  Skilled Clinical Factors: Max assist to change gown with telemetry. Lower Extremity Dressing  Assistance Level: Dependent  Skilled Clinical Factors: Dep for socks. Toileting  Skilled Clinical Factors: Jennings bag in place and no need at this time. Functional Mobility  Activity:  (Pt. sat up EOB to complete functional reaching task to increase posture and core strength.)  Assistance Level: Requires x 2 assistance;Maximum assistance  Skilled Clinical Factors: Max assist x2 to move from supine to sit on EOB. Min assist to remain in upright position with both feet on floor. Pt. tolerated 15 min of sitting upright on EOB  Bed Mobility  Overall Assistance Level: Requires x 2 Assistance;Maximum Assistance  Additional Factors: Set-up; Verbal cues; Increased time to complete; Head of bed raised; With handrails  Bridging  Skilled Clinical Factors: unable  Roll Left  Assistance Level: Maximum assistance; Requires x 2 assistance  Skilled Clinical Factors: Max assist x2 to roll side to side for correct positioning. Roll Right  Assistance Level: Maximum assistance; Requires x 2 assistance  Sit to Supine  Assistance Level: Maximum assistance; Requires x 2 assistance  Skilled Clinical Factors: Max assist x2 to return to supine position with lifting legs to bed and head on pillow. Supine to Sit  Assistance Level: Maximum assistance; Requires x 2 assistance  Scooting  Skilled Clinical Factors: unable   OT Exercises  Exercise Treatment: Pt. completed seated reaching task while sitting up on EOB. Pt. reached across midline to promote core strength and posture control in trunk. Pt. completed two sets of 10 reps with success with mod fatigue between sets. Assessment  Assessment  Assessment: Pt. completed bed mobility and tolerated sitting EOB with min support x15 min. Pt. required max assist x2 for bed mobility and proper positioning. Pt. returned to bed and positioning on right side for pressure relief to bottom. Skilled OT warranted to promote I/safety to return pt to prior living arrangement with assist as needed. Activity Tolerance: Patient limited by fatigue;Treatment limited secondary to decreased cognition  Discharge Recommendations: Patient would benefit from continued therapy after discharge  Safety Devices  Safety Devices in place: Yes  Type of devices: Bed alarm in place; All fall risk precautions in place;Call light within reach;Nurse notified;Gait belt  Restraints  Initially in place: No    Patient Education  Education  Education Given To: Patient  Education Provided: Role of Therapy;Plan of Care;Safety; Mobility Training;ADL Function  Education Provided Comments: Pt. educated on the importance of mobility and how to increase functional endurance to increase ADL skills. Education Method: Verbal;Demonstration  Barriers to Learning: Cognition; Hearing  Education Outcome: Continued education needed    Plan  Occupational Therapy Plan  Times Per Week: 4-5 X per week  Times Per Day: Once a day  Current Treatment Recommendations: Strengthening;Balance training;Functional mobility training; Endurance training;Cognitive reorientation; Safety education & training;Patient/Caregiver education & training;Equipment evaluation, education, & procurement;Self-Care / ADL  Additional Comments: Cont with stated POC    Goals  Patient Goals   Patient goals : Pt did not state goal  Short Term Goals  Time Frame for Short Term Goals:  For LOS, pt will  Short Term Goal 1: complete hygiene task with SBA  Short Term Goal 2: complete bed mobility for ADL with min assist  Short Term Goal 3: complete self feed with SBA  Short Term Goal 4: complete UB bathe and dress (don gown) with min assist  Short Term Goal 5: be able to sit on EOB with SBA for ADL. Additional Goals?: No  Long Term Goals  Long Term Goal 1: Pt will be able to stand at [facility] for ADL with mod assist.    AM-PAC Score        AM-PAC Inpatient Daily Activity Raw Score: 10 (02/13/23 1245)  AM-PAC Inpatient ADL T-Scale Score : 27.31 (02/13/23 1245)  ADL Inpatient CMS 0-100% Score: 74.7 (02/13/23 1245)  ADL Inpatient CMS G-Code Modifier : CL (02/13/23 1245)      Therapy Time   Individual Co-treat Group Co-treatment   Time In [phone]       Time Out 5574  6309       Minutes 9  30        Co-treatment with PT warranted secondary to decreased safety and independence requiring 2 skilled therapy professionals to address individual discipline's goals. OT addressing preparation for ADL transfer, sitting balance for increased ADL performance, sitting/activity tolerance, functional reaching, environmental safety/scanning, ability to sequence and follow directions, bed mobility tech, and functional UE strength.         OFELIA Deal

## 2023-02-13 NOTE — OP NOTE
Operative Note      Patient: Ronak Finch  YOB: 1935  MRN: [id number]    Date of Procedure: 2/13/2023    Pre-Op Diagnosis: Wound of sacral region, sequela [S31.000S]    Post-Op Diagnosis:   Stage IV sacral decubitus ulcer, total dimensions 8x4cm with depth of 2cm       Procedure(s):  Sacral wound debridement with sharp (scissors, cautery) and blunt (curette) debridement  Application of wound vac    Surgeon(s):  Anderson East DO    Assistant:   * No surgical staff found *    Anesthesia: Monitor Anesthesia Care    Estimated Blood Loss (mL): Minimal    Complications: None    Specimens:   ID Type Source Tests Collected by Time Destination   1 : sacrum culture Tissue Sacrum CULTURE, TISSUE Anderson East DO 2/13/2023 1239        Implants:  * No implants in log *      Drains:   Urinary Catheter 02/12/23 Jennings (Active)   Catheter Indications Assist in healing of open sacral or perineal wounds (Stage III, IV, or unstageable documented by a provider or enterostomal therapy) and/or full thickness perineal and lower extremity burns in incontinent patients 02/13/23 0905   Site Assessment No urethral drainage 02/13/23 0905   Urine Color Yellow 02/13/23 1315   Urine Appearance Clear 02/13/23 1315   Urine Odor Malodorous 02/13/23 0406   Collection Container Standard 02/13/23 1315   Securement Method Securing device (Describe) 02/13/23 0905   Catheter Care  Soap and water 02/13/23 0719   Catheter Best Practices  Bag below bladder 02/13/23 1315   Status Draining 02/13/23 1315   Output (mL) 600 mL 02/13/23 1418       [REMOVED] External Urinary Catheter (Removed)   Site Assessment Pink;Clean,dry & intact 02/12/23 1314   Placement Replaced 02/12/23 1314   Securement Method Other (Comment) 02/12/23 0849   Catheter Care Catheter/Wick replaced 02/12/23 0849   Perineal Care Yes 02/12/23 0849   Suction Other 02/11/23 1052   Urine Color Sarah 02/12/23 0447   Urine Appearance Cloudy 02/11/23 1052   Output (mL) 1100 mL 02/12/23 1314 Findings: as above. Wound class 4    Detailed Description of Procedure:       HISTORY: The patient is a 81y.o. year old female with history of above preop diagnosis. The risk, benefits, expected outcome, and alternatives to the procedure were explained to the patient's understanding and written informed consent was obtained. OPERATIVE DETAIL:  The patient was brought to the operating suite. Timeout was performed verifying correct patient, position, equipment and procedure to be performed. EPC cuffs were applied. Antibiotics continued from inpatient regimen. General anesthesia was administered. Endotracheal intubation was performed. The patient was then transferred to the OR table in prone position with all appropriate pressure offloading. The sacrum was prepped and draped in the usual sterile fashion. Combination of sharp scissors, cautery and curette was used to debride devitalized tissue until healthy punctate bleeding was noted at the wound periphery and the distal base. The superior base shows devitalized muscle overlying the sacrum, at the risk of exposing the periosteum this was left along and instead superficially cauterized in order to promote sloughing and healing. Excised tissues sent to pathology for culture. The cavity was thoroughly irrigated with hydrogen peroxide. Local analgesia with 1% lidocaine and 0.25% marcaine without epinephrine. Wound vac was applied. Andi Abernathy was laid over the base, this was covered by white foam and black foam. Hydrocolloid dressing was used to protect the surrounding skin then vac dressing applied. The right buttock was covered with hydrocolloid and foam dressing so that the vac tubing could pass laterally without causing pressure ulceration to the normal skin. Vac tubing was connected to the device and seal was excellent. All sponge needle and instrument counts were correct at the end of the case.  The patient tolerated the procedure well without complications. She was successfully extubated and taken to PACU for further recovery.       Electronically signed by Aimee Rogers DO on 2/13/2023 at 3:40 PM

## 2023-02-13 NOTE — PLAN OF CARE
Problem: Discharge Planning  Goal: Discharge to home or other facility with appropriate resources  Outcome: Progressing  Flowsheets (Taken 2/12/2023 0849)  Discharge to home or other facility with appropriate resources:   Identify barriers to discharge with patient and caregiver   Arrange for needed discharge resources and transportation as appropriate   Identify discharge learning needs (meds, wound care, etc)   Refer to discharge planning if patient needs post-hospital services based on physician order or complex needs related to functional status, cognitive ability or social support system     Problem: Skin/Tissue Integrity  Goal: Absence of new skin breakdown  Description: 1. Monitor for areas of redness and/or skin breakdown  2. Assess vascular access sites hourly    Outcome: Progressing  Note: Checked for incontinence every 2 hours and prn. Pericare as needed. Assisted to reposition off back frequently. On waffle mattress. Heels off bed with pillows. Problem: Safety - Adult  Goal: Free from fall injury  Outcome: Progressing  Flowsheets (Taken 2/12/2023 2022)  Free From Fall Injury:   Instruct family/caregiver on patient safety   Based on caregiver fall risk screen, instruct family/caregiver to ask for assistance with transferring infant if caregiver noted to have fall risk factors     Problem: ABCDS Injury Assessment  Goal: Absence of physical injury  Outcome: Progressing  Flowsheets (Taken 2/12/2023 2022)  Absence of Physical Injury: Implement safety measures based on patient assessment  Note: Siderails up x 2  Hourly rounding  Call light in reach  Instructed to call for assist before attempting out of bed.   Remains free from falls and accidental injury at this time   Floor free from obstacles  Bed is locked and in lowest position  Adequate lighting provided  Bed alarm on, Red Falling star and Stay with Me signs posted     Problem: Chronic Conditions and Co-morbidities  Goal: Patient's chronic conditions and co-morbidity symptoms are monitored and maintained or improved  Outcome: Progressing  Flowsheets (Taken 2/12/2023 0849)  Care Plan - Patient's Chronic Conditions and Co-Morbidity Symptoms are Monitored and Maintained or Improved:   Monitor and assess patient's chronic conditions and comorbid symptoms for stability, deterioration, or improvement   Collaborate with multidisciplinary team to address chronic and comorbid conditions and prevent exacerbation or deterioration   Update acute care plan with appropriate goals if chronic or comorbid symptoms are exacerbated and prevent overall improvement and discharge     Problem: Neurosensory - Adult  Goal: Achieves stable or improved neurological status  Outcome: Progressing  Flowsheets (Taken 2/12/2023 0849)  Achieves stable or improved neurological status:   Assess for and report changes in neurological status   Maintain blood pressure and fluid volume within ordered parameters to optimize cerebral perfusion and minimize risk of hemorrhage     Problem: Cardiovascular - Adult  Goal: Maintains optimal cardiac output and hemodynamic stability  Outcome: Progressing  Flowsheets (Taken 2/12/2023 0849)  Maintains optimal cardiac output and hemodynamic stability:   Monitor blood pressure and heart rate   Monitor urine output and notify Licensed Independent Practitioner for values outside of normal range   Assess for signs of decreased cardiac output   Administer fluid and/or volume expanders as ordered  Goal: Absence of cardiac dysrhythmias or at baseline  Outcome: Progressing  Flowsheets (Taken 2/12/2023 0849)  Absence of cardiac dysrhythmias or at baseline:   Monitor cardiac rate and rhythm   Assess for signs of decreased cardiac output     Problem: Skin/Tissue Integrity - Adult  Goal: Skin integrity remains intact  Outcome: Progressing  Flowsheets (Taken 2/12/2023 1123)  Skin Integrity Remains Intact:   Monitor for areas of redness and/or skin breakdown   Assess vascular access sites hourly  Goal: Incisions, wounds, or drain sites healing without S/S of infection  Outcome: Progressing  Flowsheets (Taken 2/12/2023 0849)  Incisions, Wounds, or Drain Sites Healing Without Sign and Symptoms of Infection:   TWICE DAILY: Assess and document skin integrity   TWICE DAILY: Assess and document dressing/incision, wound bed, drain sites and surrounding tissue   Implement wound care per orders   Initiate pressure ulcer prevention bundle as indicated  Goal: Oral mucous membranes remain intact  Outcome: Progressing  Flowsheets (Taken 2/12/2023 0849)  Oral Mucous Membranes Remain Intact:   Assess oral mucosa and hygiene practices   Implement preventative oral hygiene regimen     Problem: Musculoskeletal - Adult  Goal: Return mobility to safest level of function  Outcome: Progressing  Flowsheets (Taken 2/12/2023 0849)  Return Mobility to Safest Level of Function:   Assess patient stability and activity tolerance for standing, transferring and ambulating with or without assistive devices   Assist with transfers and ambulation using safe patient handling equipment as needed   Ensure adequate protection for wounds/incisions during mobilization   Obtain physical therapy/occupational therapy consults as needed   Instruct patient/family in ordered activity level  Goal: Return ADL status to a safe level of function  Outcome: Progressing  Flowsheets (Taken 2/12/2023 0849)  Return ADL Status to a Safe Level of Function:   Administer medication as ordered   Assess activities of daily living deficits and provide assistive devices as needed   Obtain physical therapy/occupational therapy consults as needed   Assist and instruct patient to increase activity and self care as tolerated     Problem: Genitourinary - Adult  Goal: Absence of urinary retention  Outcome: Progressing  Flowsheets (Taken 2/12/2023 0849)  Absence of urinary retention:   Assess patients ability to void and empty bladder   Monitor intake/output and perform bladder scan as needed  Goal: Urinary catheter remains patent  Outcome: Progressing  Flowsheets (Taken 2/12/2023 2022)  Urinary catheter remains patent: Assess patency of urinary catheter   Jennings in place

## 2023-02-13 NOTE — PLAN OF CARE
Problem: Discharge Planning  Goal: Discharge to home or other facility with appropriate resources  2/13/2023 0028 by Jayro Shen RN  Outcome: Progressing     Problem: Skin/Tissue Integrity  Goal: Absence of new skin breakdown  Description: 1. Monitor for areas of redness and/or skin breakdown  2. Assess vascular access sites hourly  3. Every 4-6 hours minimum:  Change oxygen saturation probe site  4. Every 4-6 hours:  If on nasal continuous positive airway pressure, respiratory therapy assess nares and determine need for appliance change or resting period.   2/13/2023 0028 by Jayro Shen RN  Outcome: Progressing     Problem: Safety - Adult  Goal: Free from fall injury  2/13/2023 0028 by Jayro Shen RN  Outcome: Progressing     Problem: ABCDS Injury Assessment  Goal: Absence of physical injury  2/13/2023 0028 by Jayro Shen RN  Outcome: Progressing     Problem: Chronic Conditions and Co-morbidities  Goal: Patient's chronic conditions and co-morbidity symptoms are monitored and maintained or improved  2/13/2023 0028 by Jayro Shen RN  Outcome: Progressing     Problem: Skin/Tissue Integrity - Adult  Goal: Incisions, wounds, or drain sites healing without S/S of infection  2/12/2023 2022 by Reyes Double, RN  Outcome: Progressing  Flowsheets  Taken 2/12/2023 2015 by Jayro Shen RN  Incisions, Wounds, or Drain Sites Healing Without Sign and Symptoms of Infection: Implement wound care per orders  Problem: Genitourinary - Adult  Goal: Urinary catheter remains patent  2/13/2023 0028 by Jayro Shen RN  Outcome: Progressing     Incisions, Wounds, or Drain Sites Healing Without Sign and Symptoms of Infection:   TWICE DAILY: Assess and document skin integrity   TWICE DAILY: Assess and document dressing/incision, wound bed, drain sites and surrounding tissue   Implement wound care per orders   Initiate pressure ulcer prevention bundle as indicated

## 2023-02-13 NOTE — PROGRESS NOTES
Comprehensive Nutrition Assessment    Type and Reason for Visit:  Wound    Nutrition Recommendations/Plan:   Continue Regular diet  Start Ensure Plus High Protein 3x/day  Monitor p.o intakes, wound healing status and labs     Malnutrition Assessment:  Malnutrition Status:  Insufficient data (02/13/23 1815)        Nutrition Assessment:    Patient admission is related to cellulitis and wound infection. Patient has a large wound present to [facility]. Patient was NPO this morning for coccyx wound debridement. Diet advanced to Regular after procedure and Ensure Plus High Protein 3x/day. Monitor p.o intakes, wound healing status and labs. Nutrition Related Findings:    Edema: trace BLE. Active bowel sounds. Coccxy wound debridement (2/13) Wound Type: Surgical Incision       Current Nutrition Intake & Therapies:    Average Meal Intake: Unable to assess     ADULT DIET; Regular  ADULT ORAL NUTRITION SUPPLEMENT; Breakfast, Lunch, Dinner; Standard High Calorie/High Protein Oral Supplement    Anthropometric Measures:  Height: 5' 6\" (167.6 cm)  Ideal Body Weight (IBW): 130 lbs (59 kg)    Admission Body Weight: 128 lb (58.1 kg)  Current Body Weight: 117 lb (53.1 kg), 90 % IBW.  Weight Source: Bed Scale  Current BMI (kg/m2): 18.9  Usual Body Weight: 128 lb (58.1 kg)  % Weight Change (Calculated): -8.6                    BMI Categories: Underweight (BMI less than 22) age over 72    Estimated Daily Nutrient Needs:  Energy Requirements Based On: Kcal/kg  Weight Used for Energy Requirements: Current  Energy (kcal/day): 1676-6008 kcal (30-33 kcal/kg)  Weight Used for Protein Requirements: Current  Protein (g/day): 74-85 gm of protein (1.4-1.6 gm/kg)       Nutrition Diagnosis:   Increased nutrient needs related to increase demand for energy/nutrients as evidenced by wounds (coccyx back wound)    Nutrition Interventions:   Food and/or Nutrient Delivery: Continue Current Diet, Start Oral Nutrition Supplement  Nutrition Education/Counseling: Education not indicated  Coordination of Nutrition Care: Continue to monitor while inpatient       Goals:     Goals: PO intake 50% or greater       Nutrition Monitoring and Evaluation:      Food/Nutrient Intake Outcomes: Food and Nutrient Intake, Supplement Intake  Physical Signs/Symptoms Outcomes: Biochemical Data, Fluid Status or Edema, Skin, Weight    Discharge Planning:    Continue current diet, Continue Oral Nutrition Supplement           Zulma ROAN, RDN, LDN  Lead Clinical Dietitian  RD Office Phone [phone]

## 2023-02-14 LAB
ABSOLUTE EOS #: 0.18 K/UL (ref 0–0.44)
ABSOLUTE IMMATURE GRANULOCYTE: 0.06 K/UL (ref 0–0.3)
ABSOLUTE LYMPH #: 1.19 K/UL (ref 1.1–3.7)
ABSOLUTE MONO #: 0.84 K/UL (ref 0.1–1.2)
ANION GAP SERPL CALCULATED.3IONS-SCNC: 7 MMOL/L (ref 9–17)
BASOPHILS # BLD: 0 % (ref 0–2)
BASOPHILS ABSOLUTE: <0.03 K/UL (ref 0–0.2)
BUN SERPL-MCNC: 11 MG/DL (ref 8–23)
BUN/CREAT BLD: 16 (ref 9–20)
CALCIUM SERPL-MCNC: 8.9 MG/DL (ref 8.6–10.4)
CHLORIDE SERPL-SCNC: 109 MMOL/L (ref 98–107)
CO2 SERPL-SCNC: 22 MMOL/L (ref 20–31)
CREAT SERPL-MCNC: 0.67 MG/DL (ref 0.5–0.9)
EOSINOPHILS RELATIVE PERCENT: 2 % (ref 1–4)
GFR SERPL CREATININE-BSD FRML MDRD: >60 ML/MIN/1.73M2
GLUCOSE SERPL-MCNC: 120 MG/DL (ref 70–99)
HCT VFR BLD AUTO: 28 % (ref 36.3–47.1)
HGB BLD-MCNC: 8.7 G/DL (ref 11.9–15.1)
IMMATURE GRANULOCYTES: 1 %
LYMPHOCYTES # BLD: 16 % (ref 24–43)
MCH RBC QN AUTO: 29.5 PG (ref 25.2–33.5)
MCHC RBC AUTO-ENTMCNC: 31.1 G/DL (ref 28.4–34.8)
MCV RBC AUTO: 94.9 FL (ref 82.6–102.9)
MICROORGANISM SPEC CULT: NORMAL
MICROORGANISM SPEC CULT: NORMAL
MONOCYTES # BLD: 11 % (ref 3–12)
NRBC AUTOMATED: 0 PER 100 WBC
PDW BLD-RTO: 13 % (ref 11.8–14.4)
PLATELET # BLD AUTO: 313 K/UL (ref 138–453)
PMV BLD AUTO: 9.4 FL (ref 8.1–13.5)
POTASSIUM SERPL-SCNC: 3.9 MMOL/L (ref 3.7–5.3)
RBC # BLD: 2.95 M/UL (ref 3.95–5.11)
SEG NEUTROPHILS: 70 % (ref 36–65)
SEGMENTED NEUTROPHILS ABSOLUTE COUNT: 5.1 K/UL (ref 1.5–8.1)
SERVICE CMNT-IMP: NORMAL
SERVICE CMNT-IMP: NORMAL
SODIUM SERPL-SCNC: 138 MMOL/L (ref 135–144)
SPECIMEN DESCRIPTION: NORMAL
SPECIMEN DESCRIPTION: NORMAL
WBC # BLD AUTO: 7.4 K/UL (ref 3.5–11.3)

## 2023-02-14 PROCEDURE — 6370000000 HC RX 637 (ALT 250 FOR IP): Performed by: SURGERY

## 2023-02-14 PROCEDURE — 2580000003 HC RX 258: Performed by: SURGERY

## 2023-02-14 PROCEDURE — 6360000002 HC RX W HCPCS: Performed by: SURGERY

## 2023-02-14 PROCEDURE — 1200000000 HC SEMI PRIVATE

## 2023-02-14 PROCEDURE — 97535 SELF CARE MNGMENT TRAINING: CPT

## 2023-02-14 PROCEDURE — 80048 BASIC METABOLIC PNL TOTAL CA: CPT

## 2023-02-14 PROCEDURE — 2500000003 HC RX 250 WO HCPCS: Performed by: SURGERY

## 2023-02-14 PROCEDURE — 99232 SBSQ HOSP IP/OBS MODERATE 35: CPT | Performed by: NURSE PRACTITIONER

## 2023-02-14 PROCEDURE — 36415 COLL VENOUS BLD VENIPUNCTURE: CPT

## 2023-02-14 PROCEDURE — 85025 COMPLETE CBC W/AUTO DIFF WBC: CPT

## 2023-02-14 PROCEDURE — 97530 THERAPEUTIC ACTIVITIES: CPT

## 2023-02-14 RX ADMIN — SODIUM CHLORIDE: 9 INJECTION, SOLUTION INTRAVENOUS at 03:42

## 2023-02-14 RX ADMIN — LEVOTHYROXINE SODIUM 112 MCG: 112 TABLET ORAL at 06:29

## 2023-02-14 RX ADMIN — METRONIDAZOLE 500 MG: 500 TABLET ORAL at 13:21

## 2023-02-14 RX ADMIN — METOPROLOL TARTRATE 5 MG: 5 INJECTION, SOLUTION INTRAVENOUS at 20:35

## 2023-02-14 RX ADMIN — CEFEPIME 2000 MG: 2 INJECTION, POWDER, FOR SOLUTION INTRAVENOUS at 03:43

## 2023-02-14 RX ADMIN — VANCOMYCIN HYDROCHLORIDE 1500 MG: 5 INJECTION, POWDER, LYOPHILIZED, FOR SOLUTION INTRAVENOUS at 22:40

## 2023-02-14 RX ADMIN — ACETAMINOPHEN 650 MG: 325 TABLET ORAL at 13:21

## 2023-02-14 RX ADMIN — CEFEPIME 2000 MG: 2 INJECTION, POWDER, FOR SOLUTION INTRAVENOUS at 15:16

## 2023-02-14 RX ADMIN — LISINOPRIL 20 MG: 20 TABLET ORAL at 09:09

## 2023-02-14 RX ADMIN — FAMOTIDINE 20 MG: 20 TABLET, FILM COATED ORAL at 09:09

## 2023-02-14 RX ADMIN — METRONIDAZOLE 500 MG: 500 TABLET ORAL at 22:44

## 2023-02-14 RX ADMIN — SODIUM CHLORIDE: 9 INJECTION, SOLUTION INTRAVENOUS at 16:59

## 2023-02-14 RX ADMIN — ANTI-FUNGAL POWDER MICONAZOLE NITRATE TALC FREE: 1.42 POWDER TOPICAL at 10:36

## 2023-02-14 RX ADMIN — SODIUM CHLORIDE: 9 INJECTION, SOLUTION INTRAVENOUS at 03:38

## 2023-02-14 RX ADMIN — METRONIDAZOLE 500 MG: 500 TABLET ORAL at 06:29

## 2023-02-14 ASSESSMENT — ENCOUNTER SYMPTOMS
GASTROINTESTINAL NEGATIVE: 1
RESPIRATORY NEGATIVE: 1

## 2023-02-14 ASSESSMENT — PAIN SCALES - GENERAL: PAINLEVEL_OUTOF10: 3

## 2023-02-14 ASSESSMENT — PAIN DESCRIPTION - LOCATION: LOCATION: COCCYX

## 2023-02-14 NOTE — PROGRESS NOTES
[facility] General Surgery Progress Note            PATIENT NAME: Anton Kenney     TODAY'S DATE: 2023, 3:47 PM    Chief Complaint   Patient presents with    Wound Infection     Open sore to coccyx        SUBJECTIVE:    Pt seen and examined. No acute events overnight, denies pain. OBJECTIVE:   Vitals:  BP (!) 140/64   Pulse 96   Temp 98.8 °F (37.1 °C) (Oral)   Resp 18   Ht 5' 6\" (1.676 m)   Wt 136 lb 9.6 oz (62 kg)   SpO2 96%   BMI 22.05 kg/m²    TEMPERATURE:  Current - Temp: 98.8 °F (37.1 °C); Max - Temp  Av.5 °F (36.9 °C)  Min: 97.3 °F (36.3 °C)  Max: 99 °F (37.2 °C)    INTAKE/OUTPUT:      Intake/Output Summary (Last 24 hours) at 2023 1547  Last data filed at 2023 1100  Gross per 24 hour   Intake --   Output 2700 ml   Net -2700 ml                 General: AOx3, NAD  Lungs: Symmetrical chest rise bilaterally, no audible wheezes or rhonchi  Heart: S1S2  Abdomen: Soft, ND, Nontender. Extremity: moves all extremities x4, No edema      Data Review:  CBC:   Recent Labs     23  0556 23  0601 23  0714   WBC 7.7 8.0 7.4   HGB 9.5* 9.3* 8.7*    294 313     BMP:    Recent Labs     23  0556 23  0601 23  0714    139 138   K 3.4* 4.3 3.9   * 110* 109*   CO2 24 23 22   BUN 11 11 11   CREATININE 0.55 0.41* 0.67   GLUCOSE 140* 147* 120*     Hepatic: No results for input(s): AST, ALT, ALB, ALKPHOS, BILITOT, BILIDIR in the last 72 hours. Coagulation: No results for input(s): APTT, PROT, INR in the last 72 hours.           ASSESSMENT     Patient Active Problem List   Diagnosis    Mixed hyperlipidemia    Essential hypertension    Hypothyroidism    Vitamin D deficiency    Hallucinations, visual    Vitamin B12 deficiency    History of gallstones    COPD, mild (HCC)    Basal cell carcinoma of back    Arthritis of left shoulder region    Gastroesophageal reflux disease without esophagitis    Celiac disease    Duodenitis    Type 2 diabetes mellitus without complication, without long-term current use of insulin (HCC)    TIA (transient ischemic attack)    Dysarthria    Cellulitis    Pressure ulcer of coccygeal region, stage 4 (HCC)         PLAN    Continue vac therapy:  Vac sponge/dressing change [id number]  Continue adaptec > white foam > black foam  Pressure to -125mmg as long as pt is able to tolerate  Must protect normal skin with foam/padding to prevent pressure ulcer from vac tubing    2. OK to DC from [address] [place] [address] standpoint when medically stable  3. Plan outpt follow up at [facility] for continued wound monitoring  4. Supportive care. Available as needed.     Electronically signed by Aimee Rogers DO on 2/14/2023 at 3:50 PM

## 2023-02-14 NOTE — PROGRESS NOTES
Physician Progress Note      PATIENT:               Lelo Silverman  CSN #:                  [id number]  :                       1935  ADMIT DATE:       2023 5:54 PM  [address] DATE:  RESPONDING  PROVIDER #:        Obey Staples MD          QUERY TEXT:    Pt admitted with cellulitis. Pt noted to have elevated WBC and CRP. If   possible, please document in the progress notes and discharge summary if you   are evaluating and /or treating any of the following: The medical record reflects the following:  Risk Factors: Cellulitis  Clinical Indicators: WBC 13; CRP 73.1, Sed rate 82; LA 2.7; afebrile, HR   ; per ED notes-->  decubitus to the coccyx with some erythema. No   drainage. No crepitus;  Treatment: maintenance IVF @ 75ml/hr, IV Cefepime, IV Vanco, GS consult, plan   for surgical debridement on  in OR;  Options provided:  -- Sepsis, present on admission  -- Cellulitis without Sepsis  -- Other - I will add my own diagnosis  -- Disagree - Not applicable / Not valid  -- Disagree - Clinically unable to determine / Unknown  -- Refer to Clinical Documentation Reviewer    PROVIDER RESPONSE TEXT:    Provider disagreed with this query.   see note    Query created by: Osvaldo Phillips on 2023 12:20 PM      Electronically signed by:  Obey Staples MD 2023 3:17 PM

## 2023-02-14 NOTE — DISCHARGE INSTRUCTIONS
Wound care instructions:    Vac sponge/dressing change [id number]  Continue adaptec > white foam > black foam until seen in [address] follow-up  Pressure to -125mmg as long as pt is able to tolerate  Must protect normal skin with foam/padding to prevent pressure ulcer from vac tubing

## 2023-02-14 NOTE — PROGRESS NOTES
Occupational Therapy  Facility/Department: [facility] MED SURG  Rehabilitation Occupational Therapy Daily Treatment Note    Date: 23  Patient Name: Ronak Finch       Room: 4338/2810-14  MRN: [id number]  Account: [de-identified]   : 1935  (81 y.o.) Gender: female      RN Tomas Melendez reports patient is medically stable for therapy treatment this date. Chart reviewed prior to treatment and patient is agreeable for therapy. All lines intact and patient positioned comfortably at end of treatment. All patient needs addressed prior to ending therapy session. Pt currently functioning below baseline. Recommend daily inpatient skilled therapy at time of discharge to maximize long term outcomes and prevent re-admission. Please refer to AM-PAC score for current level of function. Past Medical History:  has a past medical history of Anemia, Gastroesophageal reflux disease without esophagitis, Hard of hearing, Hyperlipidemia, Hypertension, Hypothyroidism, and TIA (transient ischemic attack). Past Surgical History:   has a past surgical history that includes Tonsillectomy; Carpal tunnel release (Right); and back surgery (N/A, 2023). Restrictions  Restrictions/Precautions: General Precautions; Fall Risk  Other position/activity restrictions: Up with assist, telemetry, Heels off bed at all times, LUE IV, bui, VERY [place], h/o dementia per chart, ALARMS  Required Braces or Orthoses?: No    Subjective  Subjective: Pt resting in bed upon arrival agreeable to therapy. Very [place]. Restrictions/Precautions: General Precautions; Fall Risk             Objective     Cognition  Overall Cognitive Status: Exceptions  Arousal/Alertness: Delayed responses to stimuli  Following Commands: Follows one step commands with repetition; Follows one step commands with increased time; Inconsistently follows commands  Attention Span: Difficulty attending to directions  Memory: Decreased recall of recent events;Decreased recall of biographical Information  Safety Judgement: Decreased awareness of need for assistance;Decreased awareness of need for safety  Problem Solving: Decreased awareness of errors;Assistance required to identify errors made;Assistance required to correct errors made  Insights: Decreased awareness of deficits  Initiation: Requires cues for all  Sequencing: Requires cues for all  Orientation  Orientation Level: Oriented to person;Disoriented to place; Disoriented to time;Disoriented to situation         ADL  Grooming/Oral Hygiene  Assistance Level: Maximum assistance;Set-up; Verbal cues  Skilled Clinical Factors: When handed wash cloth and given VC for initiation pt was able to wash/dry face seated EOB. Writer assisted with washing and coming pt's hair. Upper Extremity Bathing  Assistance Level: Minimal assistance;Set-up; Verbal cues  Skilled Clinical Factors: With Max VC's for initiation and attention to task pt was able to was entire UB but required Min A for thoroughness all completed while seated EOB. Lower Extremity Bathing  Assistance Level: Maximum assistance;Set-up  Skilled Clinical Factors: While seated EOB pt was able to wash B thighs down to knees with VC's for initiaton and required assist for down to feet. Attempted to stand pt with damian nair to wash private area but was unable and RN Husam Jones reported she would complete at end of session while pt is supine in bed. Upper Extremity Dressing  Assistance Level: Maximum assistance  Skilled Clinical Factors: Max assist to change gown with telemetry. Putting On/Taking Off Footwear  Assistance Level: Dependent  Skilled Clinical Factors: Donning/doffing socks  Toileting  Assistance Level: Dependent  Skilled Clinical Factors: Jennings bag in place and no need at this time. Bed Mobility  Overall Assistance Level: Requires x 2 Assistance;Maximum Assistance (Required Max A x2 for progression of UB/LB to fully seated EOB and back into bed at end of session.  Max verbal/tactile cueing for use of bed rails and sequencing.)  Additional Factors: Increased time to complete; Head of bed raised; With handrails;Verbal cues  Roll Left  Assistance Level: Maximum assistance; Requires x 2 assistance  Skilled Clinical Factors: Max assist x2 to roll side to side for correct positioning and placement of pillows. Sit to Supine  Assistance Level: Maximum assistance; Requires x 2 assistance  Supine to Sit  Assistance Level: Maximum assistance; Requires x 2 assistance  Transfers  Additional Factors: Hand placement cues; Verbal cues; Increased time to complete  Device: Lift equipment ([facility])  Sit to Stand  Assistance Level: Maximum assistance; Requires x 2 assistance  Skilled Clinical Factors: Attempted x3 stands in [facility] all unsuccessful. Pt appeared to be resistive with LB pushing back into bed but appeared to be attempting to pull up with UB. D/t cognitive deficits pt not receptive to proper cueing for standing. Stand to Sit  Assistance Level: Maximum assistance; Requires x 2 assistance         Assessment  Assessment  Assessment: Pt able to sit EOB for extended time for ADLs and with Max verbal/tactile cueing pt is able to complete UB ADLs and some LB ADLs on own but is not able to stand. x3 stands attempted in [address] [place] [address] with pt unable to bring buttocks off of bed but appeard to be resistive d/t cognitive deficits. Skilled OT warranted to promote I/safety to return pt to prior living arrangement with assist as needed. Activity Tolerance: Patient limited by fatigue;Treatment limited secondary to decreased cognition  Discharge Recommendations: Patient would benefit from continued therapy after discharge  OT Equipment Recommendations  Equipment Needed: No  Safety Devices  Safety Devices in place: Yes  Type of devices: Bed alarm in place; All fall risk precautions in place;Call light within reach;Nurse notified;Gait belt;Left in bed;Patient at risk for falls    Patient Education  Education  Education Given To: Patient  Education Provided: Role of Therapy;Plan of Care;Safety; Mobility Training;ADL Function;Cognition  Education Method: Verbal;Demonstration  Barriers to Learning: Cognition; Hearing    Plan  Occupational Therapy Plan  Times Per Week: 4-5 X per week  Times Per Day: Once a day  Current Treatment Recommendations: Strengthening;Balance training;Functional mobility training; Endurance training;Cognitive reorientation; Safety education & training;Patient/Caregiver education & training;Equipment evaluation, education, & procurement;Self-Care / ADL  Additional Comments: Cont with stated POC    Goals  Patient Goals   Patient goals : Pt did not state goal  Short Term Goals  Time Frame for Short Term Goals: For LOS, pt will  Short Term Goal 1: complete hygiene task with SBA  Short Term Goal 2: complete bed mobility for ADL with min assist  Short Term Goal 3: complete self feed with SBA  Short Term Goal 4: complete UB bathe and dress (don gown) with min assist  Short Term Goal 5: be able to sit on EOB with SBA for ADL. Long Term Goals  Long Term Goal 1: Pt will be able to stand at EOB for ADL with mod assist.    AM-PAC Score: 11               Therapy Time   Co-Treatment Concurrent Group Co-treatment   Time In 1017         Time Out 1056         Minutes 44               Co-treatment with PT warranted secondary to decreased safety and independence requiring 2 skilled therapy professionals to address individual discipline's goals. OT addressing preparation for ADL transfer, sitting balance for increased ADL performance, sitting/activity tolerance, functional reaching, environmental safety/scanning, fall prevention, functional mobility for ADL transfers, ability to sequence and follow directions, bed mobility tech, and functional UE strength.      OFELIA Quinn

## 2023-02-14 NOTE — PROGRESS NOTES
[facility] Pharmacokinetic Monitoring Service - Vancomycin    Consulting Provider: Gisele Uribe CNP   Indication: cellulitis  Target Concentration: Goal trough of 10-15 mg/L and AUC/JANET <500 mg*hr/L  Day of Therapy: 6  Additional Antimicrobials: cefepime/flagyl    Pertinent Laboratory Values: Wt Readings from Last 1 Encounters:   02/12/23 119 lb 3.2 oz (54.1 kg)     Temp Readings from Last 1 Encounters:   02/11/23 98.6 °F (37 °C) (Axillary)     Estimated Creatinine Clearance: 63 mL/min (based on SCr of 0.55 mg/dL). Recent Labs     02/13/23  0601 02/14/23  0714   CREATININE 0.41* 0.67   WBC 8.0 7.4       Pertinent Cultures:  Culture Date Source Results   2/29/23 Blood x 2 No growth      Recent vancomycin administrations                     vancomycin (VANCOCIN) 1,500 mg in sodium chloride 0.9 % 250 mL IVPB (mg) 1,500 mg New Bag 02/11/23 2308    vancomycin (VANCOCIN) 1,250 mg in sodium chloride 0.9 % 250 mL IVPB (mg) 1,250 mg New Bag 02/10/23 2142    vancomycin (VANCOCIN) 1,500 mg in sodium chloride 0.9 % 250 mL IVPB (mg) 1,500 mg New Bag 02/09/23 2157                    Assessment:  Date/Time Current Dose Concentration Timing of Concentration (h) AUC   2/12/23 @0556 1500mg q24hr 22.2 7hr 499   Note: Serum concentrations collected for AUC dosing may appear elevated if collected in close proximity to the dose administered, this is not necessarily an indication of toxicity    Plan:  Current dosing regimen is therapeutic.   Scr increased to 0.67  Continue current dose  Pharmacy will continue to monitor patient and adjust therapy as indicated    Thank you for the consult,  CHARIS Bundy [facility]  2/14/2023 9:26 AM

## 2023-02-14 NOTE — PROGRESS NOTES
Progress note  [facility].,    Adult Hospitalist      Name: Davida Ford  MRN: [id number]     Acct: [de-identified]  Room: 2002/2002-02    Admit Date: 2/9/2023  5:54 PM  PCP: Flint Cranker    Primary Problem  Principal Problem:    Cellulitis  Active Problems:    Pressure ulcer of coccygeal region, stage 4 ([address]  Resolved Problems:    * No resolved hospital problems. *        Assesment:     Decubitus coccygeal ulcer -unstageable, 8x4 cm, 2 cm deep  Leukocytosis  Hypertension   Hyperlipidemia   History of TIA  Advanced dementia  Recent h/o Covid 19 infection   Failure to thrive, adult         Plan:      Admitted to med surge telemetry  O2 maintain oxygen saturation greater than 92%     Lactate and procalcitonin  Blood culture  Wound culture   IV vancomycin and cefepime , metronidazole   Id consult   General surgery consult  Dakin's soln  DQ wound debridement 2/13    Continue to monitor/telemetry/CBC with differential daily/BMP daily  DVT and GI prophylaxis.   Continue medications as below      Scheduled Meds:   miconazole   Topical BID    vancomycin  1,500 mg IntraVENous Q24H    metroNIDAZOLE  500 mg Oral 3 times per day    sodium hypochlorite   Irrigation BID    cefepime  2,000 mg IntraVENous Q24H    lisinopril  20 mg Oral Daily    levothyroxine  112 mcg Oral QAM AC    sodium chloride flush  5-40 mL IntraVENous 2 times per day    enoxaparin  40 mg SubCUTAneous Daily    famotidine  20 mg Oral Daily    vancomycin (VANCOCIN) intermittent dosing (placeholder)   Other RX Placeholder     Continuous Infusions:   sodium chloride 75 mL/hr at 02/13/23 0452    sodium chloride       PRN Meds:  metoprolol, 5 mg, Q6H PRN  oxyCODONE, 2.5 mg, Q8H PRN  sodium chloride flush, 10 mL, PRN  sodium chloride, , PRN  potassium chloride, 40 mEq, PRN   Or  potassium alternative oral replacement, 40 mEq, PRN   Or  potassium chloride, 10 mEq, PRN  magnesium sulfate, 1,000 mg, PRN  ondansetron, 4 mg, Q8H PRN   Or  ondansetron, 4 mg, Q6H PRN  magnesium hydroxide, 30 mL, Daily PRN  acetaminophen, 650 mg, Q6H PRN   Or  acetaminophen, 650 mg, Q6H PRN      Chief Complaint:     Chief Complaint   Patient presents with    Wound Infection     Open sore to coccyx          History of Present Illness:   Patient seen examined at bedside. Patient did not talk much. No acute events reported. Afebrile    HPI:      Larisa Wolf is a 81 y.o.  female who presents with Wound Infection (Open sore to coccyx )    71-year-old lady presented to ER complaining of wound infection on her coccyx. She presented from [facility] care facility. She denies any fever, chills, cough, cold, changes urination, bowel habit or rash. Past medical history significant for dementia, hearing impairment, hypertension, hyperlipidemia, GERD. Patient is complaining of some pain at her coccygeal area. Sodium was 134. WBC was 13. I have personally reviewed the past medical history, past surgical history, medications, social history, and family history, and summarized in the note. Review of Systems:     All 10 point system is reviewed and negative otherwise mentioned in HPI. Past Medical History:     Past Medical History:   Diagnosis Date    Anemia     Gastroesophageal reflux disease without esophagitis 12/20/2017    Hard of hearing     Hyperlipidemia     Hypertension     Hypothyroidism     TIA (transient ischemic attack)         Past Surgical History:     Past Surgical History:   Procedure Laterality Date    BACK SURGERY N/A 2/13/2023    BACK WOUND DEBRIDEMENT performed by Bo Song DO at [address] [place] [address] Right     TONSILLECTOMY      age 16        Medications Prior to Admission:       Prior to Admission medications    Medication Sig Start Date End Date Taking?  Authorizing Provider   levothyroxine (SYNTHROID) 112 MCG tablet Take 112 mcg by mouth every morning (before breakfast)   Yes Historical Provider, MD   sodium chloride (OCEAN, BABY AYR) 0.65 % nasal spray 1 spray by Nasal route as needed for Congestion   Yes Historical Provider, MD   magnesium cl-calcium carbonate (SLOW-MAG) 71.5-119 MG TBEC tablet Take 1 tablet by mouth daily   Yes Historical Provider, MD   oxyCODONE (ROXICODONE) 5 MG immediate release tablet Take 2.5 mg by mouth every 8 hours as needed for Pain. Yes Historical Provider, MD   collagenase 250 UNIT/GM ointment Apply topically daily TO COCCYX WOUND. Yes Historical Provider, MD   acetaminophen (TYLENOL) 500 MG tablet Take 500 mg by mouth every 6 hours as needed for Pain or Fever   Yes Historical Provider, MD   fosinopril (MONOPRIL) 20 MG tablet TAKE 1 TABLET BY MOUTH EVERY DAY  Patient taking differently: Take 20 mg by mouth daily TAKE 1 TABLET BY MOUTH EVERY DAY 11/10/19   MUMTAZ Sheth - CNP   Multiple Vitamins-Minerals (CENTRUM SILVER PO) Take 1 tablet by mouth daily (before dinner)    Historical Provider, MD        Allergies:       Gluten meal and Penicillins    Social History:     Tobacco:    reports that she quit smoking about 52 years ago. She has never used smokeless tobacco.  Alcohol:      reports no history of alcohol use. Drug Use:  reports no history of drug use.     Family History:     Family History   Problem Relation Age of Onset    Cancer Mother         breast    Other Father         ulcers         Physical Exam:     Vitals:  BP (!) 145/71   Pulse (!) 112   Temp 99 °F (37.2 °C) (Oral)   Resp 16   Ht 5' 6\" (1.676 m)   Wt 117 lb (53.1 kg)   SpO2 98%   BMI 18.88 kg/m²   Temp (24hrs), Av.8 °F (36.6 °C), Min:97 °F (36.1 °C), Max:99 °F (37.2 °C)          General appearance - non verbal and in no acute distress  Mental status - not oriented to person, place, and time with normal affect  Head - normocephalic and atraumatic  Eyes - extraocular eye movements intact, conjunctiva clear  Ears - ext ears normal  Nose - no drainage noted  Mouth - mucous membranes moist  Neck - supple, no carotid bruits, thyroid not palpable  Chest - clear to auscultation, normal effort  Heart - normal rate, regular rhythm, no murmur  Abdomen - soft, nontender, nondistended, bowel sounds present all four quadrants, no masses, hepatomegaly or splenomegaly  Neurological - difficult to assess  Extremities - peripheral pulses palpable, no pedal edema or calf pain with palpation  Skin - no gross lesions, rashes, or induration noted        Data:     Labs:    Hematology:  Recent Labs     02/11/23  0537 02/12/23  0556 02/13/23  0601   WBC 8.1 7.7 8.0   RBC 3.43* 3.26* 3.14*   HGB 10.1* 9.5* 9.3*   HCT 32.5* 30.5* 29.6*   MCV 94.8 93.6 94.3   MCH 29.4 29.1 29.6   MCHC 31.1 31.1 31.4   RDW 12.5 12.7 12.9    270 294   MPV 9.3 9.4 9.4       Chemistry:  Recent Labs     02/11/23  0537 02/12/23  0556 02/13/23  0601    140 139   K 3.7 3.4* 4.3    109* 110*   CO2 23 24 23   GLUCOSE 136* 140* 147*   BUN 12 11 11   CREATININE 0.52 0.55 0.41*   MG  --  1.2* 1.8   ANIONGAP 7* 7* 6*   LABGLOM >60 >60 >60   CALCIUM 9.3 9.0 9.0       Recent Labs     02/11/23  0849 02/13/23  1305   LABALBU 2.7*  --    POCGLU  --  106*         Lab Results   Component Value Date    INR 1.2 02/10/2023    INR 1.0 10/06/2022    PROTIME 15.0 (H) 02/10/2023    PROTIME 10.2 10/06/2022       Lab Results   Component Value Date/Time    SPECIAL RFA 12ML 02/09/2023 06:20 PM     Lab Results   Component Value Date/Time    CULTURE PENDING 02/13/2023 02:29 PM       No results found for: POCPH, PHART, PH, POCPCO2, QQO2KGD, PCO2, POCPO2, PO2ART, PO2, POCHCO3, JCF0UBM, HCO3, NBEA, PBEA, BEART, BE, THGBART, THB, SKH7HLT, ZKMR5YEA, B9RQAHET, O2SAT, FIO2    Radiology:    No results found.       All radiological studies reviewed                Code Status:  Full Code    Electronically signed by Rahat Castrejon MD on 2/13/2023 at 10:10 PM     Copy sent to Dr. Ruddy Madden    This note was created with the assistance of a speech-recognition program.  Although the intention is to generate a document that actually reflects the content of the visit, no guarantees can be provided that every mistake has been identified and corrected by editing. Note was updated later by me after  physical examination and  completion of the assessment.

## 2023-02-14 NOTE — PROGRESS NOTES
Physical Therapy  Facility/Department: [facility]  Rehabilitation Physical Therapy Treatment Note    NAME: Jami Silveira  : 1935 (1 y.o.)  MRN: [id number]  CODE STATUS: Full Code    Date of Service: 23       Restrictions:  Restrictions/Precautions: General Precautions; Fall Risk  Position Activity Restriction  Other position/activity restrictions: Up with assist, telemetry, Heels off bed at all times, LUE IV, bui, VERY [place], h/o dementia per chart, ALARMS     SUBJECTIVE  Subjective  Subjective: Patient asleep in bed upon therapists arrival. Patient agreeable to PT treatment upon arousal. TIERRA Olmos states patient is appropriate for PT treatement this date. OBJECTIVE  Cognition  Overall Cognitive Status: Exceptions  Arousal/Alertness: Delayed responses to stimuli  Following Commands: Follows one step commands with repetition; Follows one step commands with increased time; Inconsistently follows commands  Attention Span: Difficulty attending to directions  Memory: Decreased recall of recent events;Decreased recall of biographical Information  Safety Judgement: Decreased awareness of need for assistance;Decreased awareness of need for safety  Problem Solving: Decreased awareness of errors;Assistance required to identify errors made;Assistance required to correct errors made  Insights: Decreased awareness of deficits  Initiation: Requires cues for all  Sequencing: Requires cues for all  Cognition Comment: Pt. very [place] and required constant cues to orient to task at hand. Orientation  Orientation Level: Oriented to person;Disoriented to place; Disoriented to time;Disoriented to situation    Functional Mobility  Bed Mobility  Overall Assistance Level: Maximum Assistance; Requires x 2 Assistance  Additional Factors: Set-up; Verbal cues; Increased time to complete; Head of bed raised; With handrails  Roll Left  Assistance Level: Maximum assistance; Requires x 2 assistance  Roll Right  Assistance Level: Maximum assistance; Requires x 2 assistance  Skilled Clinical Factors: multiple rolls performed to promote bed mobility tasks and promote pressure relief in conjunction with repositioning patient  Sit to Supine  Assistance Level: Maximum assistance; Requires x 2 assistance  Skilled Clinical Factors: Patient given vc's for hand placement and progression techniques. Patient unable to self promote to [facility] this date. Supine to Sit  Assistance Level: Maximum assistance; Requires x 2 assistance  Skilled Clinical Factors: vc's for hand placement and for progression. Patient education on technique and momentum skills. Patient unable to carry over techniques this date. Scooting  Assistance Level: Maximum assistance; Requires x 2 assistance;Dependent  Skilled Clinical Factors: vc's for hand placement and to scoot all the way out and to place feet flat on the floor for increased stability and support. Patient requires MAx A to scoot to edge of bed  Patient sat EOB x16 minutes for bathing  and to maximize core control stability and endurance in seated posture. Balance  Sitting Balance: Maximum assistance  Standing Balance  Time: 16 min  Activity: seated EOB  Comments: working on core control, postural alignment and tolerance to activity. Transfers  Additional Factors: Hand placement cues; Verbal cues; Increased time to complete  Device: Lift equipment ([facility])  Sit to Stand  Assistance Level: Maximum assistance; Requires x 2 assistance  Skilled Clinical Factors: Attempted x3 stands in [facility] all unsuccessful. Pt appeared to be resistive with LB pushing back into bed but appeared to be attempting to pull up with UB. D/t cognitive deficits pt not receptive to proper cueing for standing. Stand to Sit  Assistance Level: Maximum assistance; Requires x 2 assistance                        ASSESSMENT/PROGRESS TOWARDS GOALS       Assessment    pt is not able to stand.  x3 stands attempted in [address] [place] [address] with pt unable to bring buttocks off of bed but appeard to be resistive d/t cognitive deficits. Skilled PT warranted to promote I/safety to return pt to prior living arrangement with assist as needed. Activity Tolerance: Patient limited by fatigue;Treatment limited secondary to decreased cognition  Discharge Recommendations: Patient would benefit from continued therapy after discharge  Assessment: Patient with global deconditioning and decreased aerobic capacity. Patient would benefit from continued skilled PT treatement to maximize return to PLOF. Activity Tolerance: Patient limited by fatigue;Treatment limited secondary to decreased cognition  Discharge Recommendations: Patient would benefit from continued therapy after discharge;24 hour supervision or assist    Goals  Short Term Goals  Time Frame for Short Term Goals: 12 visits  Short Term Goal 1: Inc bed mobility to [email or web address]  Short Term Goal 2: Pt able to transfer from bed to chair with MOD assist using safest device  Short Term Goal 3: Pt to tolerate sitting EOB up to 15-20 minutes with UB/LB support to improve core stability & repositioning for pressure relief & prevent sedentary complications  Short Term Goal 4: Pt able to tolerate 30 min of activity to include ex, NMR & functional mobility with device to facilitate better activity tolerance and prevent further sedentary complications    PLAN OF CARE/SAFETY  Physcial Therapy Plan  General Plan: 3-5 times per week  Current Treatment Recommendations: Strengthening;ROM;Balance training;Functional mobility training; Endurance training;Cognitive reorientation; Safety education & training;Patient/Caregiver education & training;Positioning  Safety Devices  Type of Devices: All roc prominences offloaded; All fall risk precautions in place; Bed alarm in place;Call light within reach; Heels elevated for pressure relief;Left in bed    EDUCATION  Education  Education Given To: Patient  Education Provided: Safety  Education Provided Comments: education on the importance of repositioning to prevent secondary complications. Education Method: Verbal  Barriers to Learning: Hearing  Education Outcome: Verbalized understanding;Continued education needed    AM-PAC Score    AM-PAC Inpatient Mobility Raw Score : 9  AM-PAC Inpatient T-Scale Score : 30.55  Mobility Inpatient CMS 0-100% Score: 81.38  Mobility Inpatient CMS G-Code Modifier:CM        Therapy Time   cotx Concurrent Group Co-treatment   Time In 1017         Time Out 1056         Minutes 44                 Co-treatment with OT warranted secondary to decreased safety and independence requiring 2 skilled therapy professionals to address individual discipline's goals. PT addressing preparation for  Transfers, gait and pre gait activities,  sitting balance for increased  sitting/activity tolerance, functional mobility, environmental safety/scanning, fall prevention, functional mobility  transfers and functional LE strength. Upon writer exit, call light within reach, pt retired to bed. All lines intact and patient positioned comfortably. All patient needs addressed prior to ending therapy session. Chart reviewed prior to treatment and patient is agreeable for therapy. RN reports patient is medically stable for therapy treatment this date.        ELIZABETH CHILDS, PTA, 02/14/23 at 2:37 PM

## 2023-02-14 NOTE — PLAN OF CARE
Problem: Skin/Tissue Integrity  Goal: Absence of new skin breakdown  Description: 1. Monitor for areas of redness and/or skin breakdown  2. Assess vascular access sites hourly  3. Every 4-6 hours minimum:  Change oxygen saturation probe site  4. Every 4-6 hours:  If on nasal continuous positive airway pressure, respiratory therapy assess nares and determine need for appliance change or resting period.   2/14/2023 1338 by Jocelyn Carlisle RN  Outcome: Progressing     Problem: Safety - Adult  Goal: Free from fall injury  2/14/2023 1338 by Jocelyn Carlisle RN  Outcome: Progressing     Problem: ABCDS Injury Assessment  Goal: Absence of physical injury  2/14/2023 1338 by Jocelyn Carlisle RN  Outcome: Progressing     Problem: Chronic Conditions and Co-morbidities  Goal: Patient's chronic conditions and co-morbidity symptoms are monitored and maintained or improved  2/14/2023 1338 by Jocelyn Carlisle RN  Outcome: Progressing     Problem: Neurosensory - Adult  Goal: Achieves stable or improved neurological status  2/14/2023 1338 by Jocelyn Carlisle RN  Outcome: Progressing     Problem: Cardiovascular - Adult  Goal: Maintains optimal cardiac output and hemodynamic stability  2/14/2023 1338 by Jocelyn Carlisle RN  Outcome: Progressing     Problem: Cardiovascular - Adult  Goal: Absence of cardiac dysrhythmias or at baseline  2/14/2023 1338 by Jocelyn Carlisle RN  Outcome: Progressing     Problem: Skin/Tissue Integrity - Adult  Goal: Incisions, wounds, or drain sites healing without S/S of infection  2/14/2023 1338 by Jocelyn Carlisle RN  Outcome: Progressing     Problem: Skin/Tissue Integrity - Adult  Goal: Oral mucous membranes remain intact  2/14/2023 1338 by Jocelyn Carlisle RN  Outcome: Progressing     Problem: Genitourinary - Adult  Goal: Urinary catheter remains patent  2/14/2023 1338 by Jocelyn Carlisle RN  Outcome: Progressing     Problem: Pain  Goal: Verbalizes/displays adequate comfort level or baseline comfort level  2/14/2023 1338 by Susan Landrum, RN  Outcome: Progressing

## 2023-02-14 NOTE — PROGRESS NOTES
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  S/p surgical debridement 2/13/2023  Dementia  Essential hypertension    Recommendations:   Continue IV antimicrobial therapy with cefepime, vancomycin, metronidazole  The patient underwent surgical debridement in the operating room yesterday  We will continue to follow culture data and adjust therapy accordingly    Infection Control Recommendations:   Universal precautions    Discharge Planning:   Estimated Length of IV antimicrobials: To be determined  Patient will need Midline Catheter Insertion/ PICC line Insertion: No  Patient will need: Home IV , [place],  SNF,  LTAC: Undetermined  Patient willneed outpatient wound care: No    Medical Decision making / Summary of Stay:   Bela Bob is a 81y.o.-year-old female who was initially admitted on 2/9/2023. Joanna Pfeiffer has history of dementia, hard of hearing, hypertension, hyperlipidemia, anemia, and GERD. She resides at [address] and it is my understanding that in December 2022 she had COVID-19 virus infection and since that time has not really recovered in terms of her ADLs and being herself. She has been more sedentary and it is my understanding that a few weeks ago she was found to have a skin tear by her gluteal area. This has subsequently progressed and when the patient was seen by the wound care nurse there was concern for infection and the patient was sent into the emergency department for further evaluation. The patient has been admitted for an infected gluteal ulceration and I was asked to evaluate and help with antibiotic choice.      The patient at this point in time is nonverbal, not really able to give me any history and the history it obtained from reviewing the chart and discussing the care with the nurse. Current evaluation:2023    BP (!) 140/64   Pulse 96   Temp 98.8 °F (37.1 °C) (Oral)   Resp 18   Ht 5' 6\" (1.676 m)   Wt 136 lb 9.6 oz (62 kg)   SpO2 96%   BMI 22.05 kg/m²     Temperature Range: Temp: 98.8 °F (37.1 °C) Temp  Av.1 °F (36.7 °C)  Min: 97 °F (36.1 °C)  Max: 99 °F (37.2 °C)    The patient was seen and evaluated sitting up in bed  She does complain of pain at the wound site, but otherwise denies needs    Review of Systems   Constitutional: Negative. HENT: Negative. Respiratory: Negative. Cardiovascular: Negative. Gastrointestinal: Negative. Genitourinary: Negative. Musculoskeletal: Negative. Skin: Negative. Neurological: Negative. Psychiatric/Behavioral: Negative. Physical Examination :     Physical Exam  Constitutional:       Appearance: Normal appearance. She is normal weight. HENT:      Head: Normocephalic and atraumatic. Cardiovascular:      Rate and Rhythm: Normal rate and regular rhythm. Pulmonary:      Effort: Pulmonary effort is normal. No respiratory distress. Breath sounds: Normal breath sounds. Abdominal:      General: Abdomen is flat. Bowel sounds are normal.      Palpations: Abdomen is soft. Musculoskeletal:      Comments: Coccygeal wound with wound vac in place   Skin:     General: Skin is warm and dry. Neurological:      General: No focal deficit present. Mental Status: She is alert. Mental status is at baseline. Psychiatric:         Mood and Affect: Mood normal.         Behavior: Behavior normal.         Thought Content:  Thought content normal.       Laboratory data:   I have independently reviewed the followinglabs:  CBC with Differential:   Recent Labs     23  0556 23  0601   WBC 7.7 8.0   HGB 9.5* 9.3*   HCT 30.5* 29.6*    294   LYMPHOPCT 13* 12*   MONOPCT 13* 12     BMP:   Recent Labs 02/12/23  0556 02/13/23  0601    139   K 3.4* 4.3   * 110*   CO2 24 23   BUN 11 11   CREATININE 0.55 0.41*   MG 1.2* 1.8     Hepatic Function Panel:   Recent Labs     02/11/23  0849   LABALBU 2.7*         No results found for: PROCAL  Lab Results   Component Value Date/Time    CRP 73.1 02/09/2023 06:25 PM     Lab Results   Component Value Date    SEDRATE 82 (H) 02/09/2023         No results found for: DDIMER  No results found for: FERRITIN  No results found for: LDH  No results found for: FIBRINOGEN    No results found for requested labs within last 30 days. No results found for: COVID19    No results for input(s): VANCOTROUGH in the last 72 hours. Imaging Studies:   No new imaging    Cultures:     Blood Culture 1 [[id number]] Collected: 02/09/23 1805   Order Status: Completed Specimen: Blood Updated: 02/13/23 2140    Specimen Description . BLOOD    Special Requests RAR 12ML    Culture NO GROWTH 4 DAYS   Culture, Blood 2 [[id number]] Collected: 02/09/23 1820   Order Status: Completed Specimen: Blood Updated: 02/13/23 2140    Specimen Description . BLOOD    Special Requests RFA 12ML    Culture NO GROWTH 4 DAYS   Culture, Tissue [[id number]] (Abnormal) Collected: 02/13/23 1429   Order Status: Completed Specimen: Tissue from Sacrum Updated: 02/13/23 1830    Specimen Description . BACK . WOUND    Direct Exam MODERATE NEUTROPHILS Abnormal      MIXED BACTERIAL MORPHOTYPES SEEN ON GRAM STAIN. Abnormal     Culture PENDING   Culture, Anaerobic and Aerobic [[id number]] (Abnormal) Collected: 02/09/23 1809   Order Status: Completed Specimen: Coccyx Updated: 02/11/23 1202    Specimen Description . COCCYX    Direct Exam FEW NEUTROPHILS Abnormal      MIXED BACTERIAL MORPHOTYPES SEEN ON GRAM STAIN.  Abnormal     Culture MULTIPLE SPECIES OF AEROBIC AND ANAEROBIC ENTERIC WIL       Medications:      cefepime  2,000 mg IntraVENous Q12H    miconazole   Topical BID    vancomycin  1,500 mg IntraVENous Q24H metroNIDAZOLE  500 mg Oral 3 times per day    sodium hypochlorite   Irrigation BID    lisinopril  20 mg Oral Daily    levothyroxine  112 mcg Oral QAM AC    sodium chloride flush  5-40 mL IntraVENous 2 times per day    enoxaparin  40 mg SubCUTAneous Daily    famotidine  20 mg Oral Daily    vancomycin (VANCOCIN) intermittent dosing (placeholder)   Other RX Placeholder       Electronically signed by MUMTAZ Alvarado CNP on 2/14/2023 at 7:29 AM      [facility]  MUMTAZ Alvarado CNP  [employer] messaging  OFFICE: [phone]    Thank you for allowing us to participate in the care of this patient. Please call with questions. This note is created with the assistance of a speech recognition program.  While intending to generate a document that actually reflects the content of the visit, the document can still have some errors including those of syntax and sound a like substitutions which may escape proof reading. In such instances, actual meaning can be extrapolated by contextual diversion.

## 2023-02-15 PROBLEM — L08.9 WOUND INFECTION: Status: ACTIVE | Noted: 2023-02-15

## 2023-02-15 PROBLEM — T14.8XXA WOUND INFECTION: Status: ACTIVE | Noted: 2023-02-15

## 2023-02-15 LAB
ABSOLUTE EOS #: 0.31 K/UL (ref 0–0.44)
ABSOLUTE IMMATURE GRANULOCYTE: 0.07 K/UL (ref 0–0.3)
ABSOLUTE LYMPH #: 1.42 K/UL (ref 1.1–3.7)
ABSOLUTE MONO #: 0.94 K/UL (ref 0.1–1.2)
ANION GAP SERPL CALCULATED.3IONS-SCNC: 7 MMOL/L (ref 9–17)
BASOPHILS # BLD: 0 % (ref 0–2)
BASOPHILS ABSOLUTE: 0.03 K/UL (ref 0–0.2)
BUN SERPL-MCNC: 14 MG/DL (ref 8–23)
BUN/CREAT BLD: 26 (ref 9–20)
CALCIUM SERPL-MCNC: 8.7 MG/DL (ref 8.6–10.4)
CHLORIDE SERPL-SCNC: 112 MMOL/L (ref 98–107)
CO2 SERPL-SCNC: 23 MMOL/L (ref 20–31)
CREAT SERPL-MCNC: 0.53 MG/DL (ref 0.5–0.9)
EOSINOPHILS RELATIVE PERCENT: 4 % (ref 1–4)
GFR SERPL CREATININE-BSD FRML MDRD: >60 ML/MIN/1.73M2
GLUCOSE BLD-MCNC: 128 MG/DL (ref 65–105)
GLUCOSE SERPL-MCNC: 128 MG/DL (ref 70–99)
HCT VFR BLD AUTO: 28.1 % (ref 36.3–47.1)
HGB BLD-MCNC: 8.7 G/DL (ref 11.9–15.1)
IMMATURE GRANULOCYTES: 1 %
LYMPHOCYTES # BLD: 20 % (ref 24–43)
MCH RBC QN AUTO: 29.6 PG (ref 25.2–33.5)
MCHC RBC AUTO-ENTMCNC: 31 G/DL (ref 28.4–34.8)
MCV RBC AUTO: 95.6 FL (ref 82.6–102.9)
MONOCYTES # BLD: 13 % (ref 3–12)
NRBC AUTOMATED: 0 PER 100 WBC
PDW BLD-RTO: 13.1 % (ref 11.8–14.4)
PLATELET # BLD AUTO: 313 K/UL (ref 138–453)
PMV BLD AUTO: 9.2 FL (ref 8.1–13.5)
POTASSIUM SERPL-SCNC: 3.8 MMOL/L (ref 3.7–5.3)
RBC # BLD: 2.94 M/UL (ref 3.95–5.11)
SEG NEUTROPHILS: 62 % (ref 36–65)
SEGMENTED NEUTROPHILS ABSOLUTE COUNT: 4.43 K/UL (ref 1.5–8.1)
SODIUM SERPL-SCNC: 142 MMOL/L (ref 135–144)
WBC # BLD AUTO: 7.2 K/UL (ref 3.5–11.3)

## 2023-02-15 PROCEDURE — 2580000003 HC RX 258: Performed by: INTERNAL MEDICINE

## 2023-02-15 PROCEDURE — 82947 ASSAY GLUCOSE BLOOD QUANT: CPT

## 2023-02-15 PROCEDURE — 99222 1ST HOSP IP/OBS MODERATE 55: CPT

## 2023-02-15 PROCEDURE — 6360000002 HC RX W HCPCS: Performed by: INTERNAL MEDICINE

## 2023-02-15 PROCEDURE — 2580000003 HC RX 258: Performed by: SURGERY

## 2023-02-15 PROCEDURE — 80048 BASIC METABOLIC PNL TOTAL CA: CPT

## 2023-02-15 PROCEDURE — 6370000000 HC RX 637 (ALT 250 FOR IP): Performed by: SURGERY

## 2023-02-15 PROCEDURE — 85025 COMPLETE CBC W/AUTO DIFF WBC: CPT

## 2023-02-15 PROCEDURE — 6360000002 HC RX W HCPCS: Performed by: SURGERY

## 2023-02-15 PROCEDURE — 99232 SBSQ HOSP IP/OBS MODERATE 35: CPT | Performed by: INTERNAL MEDICINE

## 2023-02-15 PROCEDURE — 1200000000 HC SEMI PRIVATE

## 2023-02-15 PROCEDURE — 36415 COLL VENOUS BLD VENIPUNCTURE: CPT

## 2023-02-15 PROCEDURE — 2500000003 HC RX 250 WO HCPCS: Performed by: SURGERY

## 2023-02-15 RX ADMIN — ANTI-FUNGAL POWDER MICONAZOLE NITRATE TALC FREE: 1.42 POWDER TOPICAL at 23:38

## 2023-02-15 RX ADMIN — METOPROLOL TARTRATE 5 MG: 5 INJECTION, SOLUTION INTRAVENOUS at 13:42

## 2023-02-15 RX ADMIN — LISINOPRIL 20 MG: 20 TABLET ORAL at 09:09

## 2023-02-15 RX ADMIN — LEVOTHYROXINE SODIUM 112 MCG: 112 TABLET ORAL at 06:27

## 2023-02-15 RX ADMIN — ANTI-FUNGAL POWDER MICONAZOLE NITRATE TALC FREE: 1.42 POWDER TOPICAL at 09:09

## 2023-02-15 RX ADMIN — CEFEPIME 2000 MG: 2 INJECTION, POWDER, FOR SOLUTION INTRAVENOUS at 03:46

## 2023-02-15 RX ADMIN — METRONIDAZOLE 500 MG: 500 TABLET ORAL at 13:43

## 2023-02-15 RX ADMIN — ENOXAPARIN SODIUM 40 MG: 100 INJECTION SUBCUTANEOUS at 09:09

## 2023-02-15 RX ADMIN — CEFEPIME 2000 MG: 2 INJECTION, POWDER, FOR SOLUTION INTRAVENOUS at 15:12

## 2023-02-15 RX ADMIN — FAMOTIDINE 20 MG: 20 TABLET, FILM COATED ORAL at 09:09

## 2023-02-15 RX ADMIN — METRONIDAZOLE 500 MG: 500 TABLET ORAL at 23:31

## 2023-02-15 RX ADMIN — METRONIDAZOLE 500 MG: 500 TABLET ORAL at 06:27

## 2023-02-15 RX ADMIN — SODIUM CHLORIDE: 9 INJECTION, SOLUTION INTRAVENOUS at 08:04

## 2023-02-15 RX ADMIN — ACETAMINOPHEN 650 MG: 325 TABLET ORAL at 03:40

## 2023-02-15 RX ADMIN — CEFTRIAXONE 2000 MG: 2 INJECTION, POWDER, FOR SOLUTION INTRAMUSCULAR; INTRAVENOUS at 20:11

## 2023-02-15 RX ADMIN — ACETAMINOPHEN 650 MG: 325 TABLET ORAL at 23:31

## 2023-02-15 ASSESSMENT — PAIN SCALES - GENERAL
PAINLEVEL_OUTOF10: 0
PAINLEVEL_OUTOF10: 0
PAINLEVEL_OUTOF10: 3
PAINLEVEL_OUTOF10: 0
PAINLEVEL_OUTOF10: 3

## 2023-02-15 ASSESSMENT — PAIN - FUNCTIONAL ASSESSMENT
PAIN_FUNCTIONAL_ASSESSMENT: PREVENTS OR INTERFERES SOME ACTIVE ACTIVITIES AND ADLS
PAIN_FUNCTIONAL_ASSESSMENT: PREVENTS OR INTERFERES SOME ACTIVE ACTIVITIES AND ADLS

## 2023-02-15 ASSESSMENT — PAIN DESCRIPTION - LOCATION
LOCATION: BUTTOCKS
LOCATION: BUTTOCKS

## 2023-02-15 NOTE — PROGRESS NOTES
Physician Progress Note      PATIENT:               Oliver Carlson  CSN #:                  [id number]  :                       1935  ADMIT DATE:       2023 5:54 PM  [address] [place] DATE:  Reji Connor  PROVIDER #:        Gasper Donis DO          QUERY TEXT:    Patient admitted with sacral decubitus ulcer. Per Op note dated    documentation of sharp debridement. To accurately reflect the procedure   performed please document the deepest depth of tissue removed as down to and   including: The medical record reflects the following:  Risk Factors: Pt admitted with unstageable decubitus ulcer complicated by   cellulitis. Clinical Indicators: In the setting of above risk factors,  Op Note   states: Sacral wound debridement with sharp (scissors, cautery) and blunt   (curette) debridement. Application of wound vac. Combination of sharp   scissors, cautery and curette was used to debride devitalized tissue until   healthy punctate bleeding was noted at the wound periphery and the distal   base. The superior base shows devitalized muscle overlying the sacrum, at the   risk of exposing the periosteum this was left along and instead superficially   cauterized in order to promote sloughing and healing. Treatment: Surgical debridement w/ wound vac placement. Options provided:  -- Excisional debridement of subcutaneous tissue  -- Excisional debridement of fascia  -- Excisional debridement of muscle  -- Other - I will add my own diagnosis  -- Disagree - Not applicable / Not valid  -- Disagree - Clinically unable to determine / Unknown  -- Refer to Clinical Documentation Reviewer    PROVIDER RESPONSE TEXT:    Excisional debridement of muscle of sacral wound was performed during   procedure on .     Query created by: Dave Adorno on 2023 5:46 PM      Electronically signed by:  Gasper Donis DO 2023 7:08 PM

## 2023-02-15 NOTE — PROGRESS NOTES
Progress note  [facility]    Adult Hospitalist      Name: Bela Bob  MRN: [id number]     Acct: [de-identified]  Room: 2002/2002-02    Admit Date: 2/9/2023  5:54 PM  PCP: John Márquez    Primary Problem  Principal Problem:    Cellulitis  Active Problems:    Pressure ulcer of coccygeal region, stage 4 ([address]  Resolved Problems:    * No resolved hospital problems. *        Assesment:     Decubitus coccygeal ulcer -unstageable, 8x4 cm, 2 cm deep  Leukocytosis  Hypertension   Hyperlipidemia   History of TIA  Advanced dementia  Recent h/o Covid 19 infection   Failure to thrive, adult         Plan:      Admitted to [facility] surge telemetry  O2 maintain oxygen saturation greater than 92%     Lactate and procalcitonin  Blood culture  Wound culture   IV vancomycin and cefepime , metronidazole   Id consult   General surgery consult  Dakin's soln  DQ wound debridement 2/13    DC planning SNF    Continue to monitor/telemetry/CBC with differential daily/BMP daily  DVT and GI prophylaxis.   Continue medications as below      Scheduled Meds:   cefepime  2,000 mg IntraVENous Q12H    miconazole   Topical BID    vancomycin  1,500 mg IntraVENous Q24H    metroNIDAZOLE  500 mg Oral 3 times per day    sodium hypochlorite   Irrigation BID    lisinopril  20 mg Oral Daily    levothyroxine  112 mcg Oral QAM AC    sodium chloride flush  5-40 mL IntraVENous 2 times per day    enoxaparin  40 mg SubCUTAneous Daily    famotidine  20 mg Oral Daily    vancomycin (VANCOCIN) intermittent dosing (placeholder)   Other RX Placeholder     Continuous Infusions:   sodium chloride 75 mL/hr at 02/14/23 1659    sodium chloride 5 mL/hr at 02/14/23 0342     PRN Meds:  metoprolol, 5 mg, Q6H PRN  oxyCODONE, 2.5 mg, Q8H PRN  sodium chloride flush, 10 mL, PRN  sodium chloride, , PRN  potassium chloride, 40 mEq, PRN   Or  potassium alternative oral replacement, 40 mEq, PRN   Or  potassium chloride, 10 mEq, PRN  magnesium sulfate, 1,000 mg, PRN  ondansetron, 4 mg, Q8H PRN   Or  ondansetron, 4 mg, Q6H PRN  magnesium hydroxide, 30 mL, Daily PRN  acetaminophen, 650 mg, Q6H PRN   Or  acetaminophen, 650 mg, Q6H PRN      Chief Complaint:     Chief Complaint   Patient presents with    Wound Infection     Open sore to coccyx          History of Present Illness:   Patient seen examined at bedside. Patient did not talk much. No acute events reported. Afebrile    HPI:      Aureliano Duffy is a 81 y.o.  female who presents with Wound Infection (Open sore to coccyx )    28-year-old lady presented to ER complaining of wound infection on her coccyx. She presented from extended care facility. She denies any fever, chills, cough, cold, changes urination, bowel habit or rash. Past medical history significant for dementia, hearing impairment, hypertension, hyperlipidemia, GERD. Patient is complaining of some pain at her coccygeal area. Sodium was 134. WBC was 13. I have personally reviewed the past medical history, past surgical history, medications, social history, and family history, and summarized in the note. Review of Systems:     All 10 point system is reviewed and negative otherwise mentioned in HPI. Past Medical History:     Past Medical History:   Diagnosis Date    Anemia     Gastroesophageal reflux disease without esophagitis 12/20/2017    Hard of hearing     Hyperlipidemia     Hypertension     Hypothyroidism     TIA (transient ischemic attack)         Past Surgical History:     Past Surgical History:   Procedure Laterality Date    BACK SURGERY N/A 2/13/2023    BACK WOUND DEBRIDEMENT performed by Dayami Garcia DO at [facility] [address] [place] [address] Right     TONSILLECTOMY      age 16        Medications Prior to Admission:       Prior to Admission medications    Medication Sig Start Date End Date Taking?  Authorizing Provider   levothyroxine (SYNTHROID) 112 MCG tablet Take 112 mcg by mouth every morning (before breakfast)   Yes Historical Provider, MD   sodium chloride (OCEAN, BABY AYR) 0.65 % nasal spray 1 spray by Nasal route as needed for Congestion   Yes Historical Provider, MD   magnesium cl-calcium carbonate (SLOW-MAG) 71.5-119 MG TBEC tablet Take 1 tablet by mouth daily   Yes Historical Provider, MD   oxyCODONE (ROXICODONE) 5 MG immediate release tablet Take 2.5 mg by mouth every 8 hours as needed for Pain. Yes Historical Provider, MD   collagenase 250 UNIT/GM ointment Apply topically daily TO COCCYX WOUND. Yes Historical Provider, MD   acetaminophen (TYLENOL) 500 MG tablet Take 500 mg by mouth every 6 hours as needed for Pain or Fever   Yes Historical Provider, MD   fosinopril (MONOPRIL) 20 MG tablet TAKE 1 TABLET BY MOUTH EVERY DAY  Patient taking differently: Take 20 mg by mouth daily TAKE 1 TABLET BY MOUTH EVERY DAY 11/10/19   MUMTAZ Olvera - CNP   Multiple Vitamins-Minerals (CENTRUM SILVER PO) Take 1 tablet by mouth daily (before dinner)    Historical Provider, MD        Allergies:       Gluten meal and Penicillins    Social History:     Tobacco:    reports that she quit smoking about 52 years ago. She has never used smokeless tobacco.  Alcohol:      reports no history of alcohol use. Drug Use:  reports no history of drug use.     Family History:     Family History   Problem Relation Age of Onset    Cancer Mother         breast    Other Father         ulcers         Physical Exam:     Vitals:  BP (!) 162/53   Pulse (!) 103   Temp 97.9 °F (36.6 °C) (Oral)   Resp 16   Ht 5' 6\" (1.676 m)   Wt 136 lb 9.6 oz (62 kg)   SpO2 98%   BMI 22.05 kg/m²   Temp (24hrs), Av.5 °F (36.9 °C), Min:97.9 °F (36.6 °C), Max:98.8 °F (37.1 °C)          General appearance - non verbal and in no acute distress  Mental status - not oriented to person, place, and time with normal affect  Head - normocephalic and atraumatic  Eyes - extraocular eye movements intact, conjunctiva clear  Ears - ext ears normal  Nose - no drainage noted  Mouth - mucous membranes moist  Neck - supple, no carotid bruits, thyroid not palpable  Chest - clear to auscultation, normal effort  Heart - normal rate, regular rhythm, no murmur  Abdomen - soft, nontender, nondistended, bowel sounds present all four quadrants, no masses, hepatomegaly or splenomegaly  Neurological - difficult to assess  Extremities - peripheral pulses palpable, no pedal edema or calf pain with palpation  Skin - no gross lesions, rashes, or induration noted        Data:     Labs:    Hematology:  Recent Labs     02/12/23  0556 02/13/23  0601 02/14/23  0714   WBC 7.7 8.0 7.4   RBC 3.26* 3.14* 2.95*   HGB 9.5* 9.3* 8.7*   HCT 30.5* 29.6* 28.0*   MCV 93.6 94.3 94.9   MCH 29.1 29.6 29.5   MCHC 31.1 31.4 31.1   RDW 12.7 12.9 13.0    294 313   MPV 9.4 9.4 9.4       Chemistry:  Recent Labs     02/12/23  0556 02/13/23  0601 02/14/23  0714    139 138   K 3.4* 4.3 3.9   * 110* 109*   CO2 24 23 22   GLUCOSE 140* 147* 120*   BUN 11 11 11   CREATININE 0.55 0.41* 0.67   MG 1.2* 1.8  --    ANIONGAP 7* 6* 7*   LABGLOM >60 >60 >60   CALCIUM 9.0 9.0 8.9       Recent Labs     02/13/23  1305   POCGLU 106*         Lab Results   Component Value Date    INR 1.2 02/10/2023    INR 1.0 10/06/2022    PROTIME 15.0 (H) 02/10/2023    PROTIME 10.2 10/06/2022       Lab Results   Component Value Date/Time    SPECIAL RFA 12ML 02/09/2023 06:20 PM     Lab Results   Component Value Date/Time    CULTURE PROTEUS SPECIES HEAVY GROWTH (A) 02/13/2023 02:29 PM       No results found for: POCPH, PHART, PH, POCPCO2, GWF5BOQ, PCO2, POCPO2, PO2ART, PO2, POCHCO3, GNS2GFU, HCO3, NBEA, PBEA, BEART, BE, THGBART, THB, XFE3KUV, EHUW3GBY, H3EXSGXW, O2SAT, FIO2    Radiology:    No results found.       All radiological studies reviewed                Code Status:  Full Code    Electronically signed by Denise Negrete MD on 2/14/2023 at 9:29 PM     Copy sent to Dr. Elise Stevens    This note was created with the assistance of a speech-recognition program. Although the intention is to generate a document that actually reflects the content of the visit, no guarantees can be provided that every mistake has been identified and corrected by editing. Note was updated later by me after  physical examination and  completion of the assessment.

## 2023-02-15 NOTE — PROGRESS NOTES
[facility]  Progress Note    Amador Hill  MRN: [id number]  Date: 2/15/2023  LOS: 6     Reason for F/U :   Infected sacrococcygeal ulceration    Impression :   Recent history of COVID-19 virus infection  Failure to thrive  Stage IV coccygeal/left gluteal pressure related ulceration with necrosis  Status post sacral wound debridement and application of a wound VAC  Total dimensions 8 x 4 cm with a depth of 2 cm  Dementia  Essential hypertension    Recommendations:   Continue intravenous antimicrobial therapy and given that the surgical culture has Proteus mirabilis isolated as well as mixed anaerobes I will narrow the antimicrobial therapy from  cefepime to ceftriaxone and stop the vancomycin. The metronidazole will continue but this will be switched to oral  Continue wound care and the plan will be for discharge on IV antibiotic therapy given this is a stage IV ulceration with the periosteum pretty much nearly exposed    Infection Control Recommendations:   Universal precautions    Discharge Planning:   Estimated Length of IV antimicrobials: 4 weeks  Patient will need Midline Catheter Insertion/ PICC line Insertion: No  Patient will need: SNF  Patient willneed outpatient wound care: Yes    Medical Decision making / Summary of Stay:   Amador Hill is a 81y.o.-year-old female who was initially admitted on 2/9/2023. Ludwig Lerma has history of dementia, hard of hearing, hypertension, hyperlipidemia, anemia, and GERD. She resides at [address] and it is my understanding that in December 2022 she had COVID-19 virus infection and since that time has not really recovered in terms of her ADLs and being herself. She has been more sedentary and it is my understanding that a few weeks ago she was found to have a skin tear by her gluteal area.   This has subsequently progressed and when the patient was seen by the wound care nurse there was concern for infection and the patient was sent into the emergency department for further evaluation. The patient has been admitted for an infected gluteal ulceration and I was asked to evaluate and help with antibiotic choice. The patient at this point in time is nonverbal, not really able to give me any history and the history it obtained from reviewing the chart and discussing the care with the nurse. Current evaluation:2/15/2023    BP (!) 167/75   Pulse (!) 113   Temp 98.8 °F (37.1 °C) (Axillary)   Resp 17   Ht 5' 6\" (1.676 m)   Wt 131 lb (59.4 kg)   SpO2 98%   BMI 21.14 kg/m²     Temperature Range: Temp: 98.8 °F (37.1 °C) Temp  Av.3 °F (36.8 °C)  Min: 97.9 °F (36.6 °C)  Max: 98.8 °F (37.1 °C)  The patient is seen and evaluated at bedside and is awake and alert has no specific complaints. She was taken to the operating room 2023 and had debridement of devitalized tissue but the base did have some devitalized muscle overlying the sacrum and that the risk of exposing the periosteum was left along and superficially cauterized. A wound VAC was placed    The wound VAC is in place the nurse does report that it was soiled earlier with stool and had to be exchanged. Review of Systems   Unable to perform ROS: Dementia     Physical Examination :     Physical Exam  Constitutional:       Appearance: She is well-developed. She is cachectic. HENT:      Head: Normocephalic and atraumatic. Mouth/Throat:      Mouth: Mucous membranes are dry. Cardiovascular:      Rate and Rhythm: Regular rhythm. Heart sounds: Normal heart sounds. Pulmonary:      Effort: Pulmonary effort is normal.      Breath sounds: Normal breath sounds. Abdominal:      General: Bowel sounds are normal.      Palpations: Abdomen is soft. Musculoskeletal:      Cervical back: Neck supple. Skin:     General: Skin is warm and dry. Comments:  The gluteal wound VAC is in place and was not removed   Neurological:      Mental Status: She is alert and oriented to person, place, and time. Laboratory data:   I have independently reviewed the followinglabs:  CBC with Differential:   Recent Labs     02/14/23  0714 02/15/23  0539   WBC 7.4 7.2   HGB 8.7* 8.7*   HCT 28.0* 28.1*    313   LYMPHOPCT 16* 20*   MONOPCT 11 13*       BMP:   Recent Labs     02/13/23  0601 02/14/23  0714 02/15/23  0539    138 142   K 4.3 3.9 3.8   * 109* 112*   CO2 23 22 23   BUN 11 11 14   CREATININE 0.41* 0.67 0.53   MG 1.8  --   --        Hepatic Function Panel:   No results for input(s): PROT, LABALBU, BILIDIR, IBILI, BILITOT, ALKPHOS, ALT, AST in the last 72 hours. No results found for: PROCAL  Lab Results   Component Value Date/Time    CRP 73.1 02/09/2023 06:25 PM     Lab Results   Component Value Date    SEDRATE 82 (H) 02/09/2023     No results found for: DDIMER  No results found for: FERRITIN  No results found for: LDH  No results found for: FIBRINOGEN    No results found for requested labs within last 30 days. No results found for: COVID19    No results for input(s): VANCOTROUGH in the last 72 hours. Imaging Studies:   No new imaging    Cultures:     Culture, Tissue [[id number]] (Abnormal)  Collected: 02/13/23 1422   Order Status: Completed Specimen: Tissue from Sacrum Updated: 02/15/23 0527    Specimen Description . BACK . WOUND    Direct Exam MODERATE NEUTROPHILS Abnormal      MIXED BACTERIAL MORPHOTYPES SEEN ON GRAM STAIN.  Abnormal     Culture PROTEUS MIRABILIS HEAVY GROWTH Abnormal      MIXED ANAEROBIC WIL     NORMAL SKIN WIL     Susceptibility    Proteus mirabilis (1)    Antibiotic Interpretation Microscan Method Status    ampicillin Sensitive <=2 BACTERIAL SUSCEPTIBILITY PANEL JANET Preliminary    ceFAZolin Sensitive 8 BACTERIAL SUSCEPTIBILITY PANEL JANET Preliminary    cefTRIAXone Sensitive <=0.25 BACTERIAL SUSCEPTIBILITY PANEL JANET Preliminary    gentamicin Sensitive <=1 BACTERIAL SUSCEPTIBILITY PANEL JANET Preliminary    levofloxacin Sensitive <=0.12 BACTERIAL SUSCEPTIBILITY PANEL JANET Preliminary    piperacillin-tazobactam Sensitive <=4 BACTERIAL SUSCEPTIBILITY PANEL JANET Preliminary    tobramycin Sensitive <=1 BACTERIAL SUSCEPTIBILITY PANEL JANET Preliminary    trimethoprim-sulfamethoxazole Sensitive <=20 BACTERIAL SUSCEPTIBILITY PANEL JANET Preliminary          Specimen Collected: 02/13/23 14:29 EST Last Resulted: 02/15/23 11:06 EST        Culture, Anaerobic and Aerobic [[id number]] (Abnormal) Collected: 02/09/23 1809   Order Status: Completed Specimen: Coccyx Updated: 02/15/23 0740    Specimen Description . COCCYX    Direct Exam FEW NEUTROPHILS Abnormal      MIXED BACTERIAL MORPHOTYPES SEEN ON GRAM STAIN. Abnormal     Culture MULTIPLE SPECIES OF AEROBIC AND ANAEROBIC ENTERIC WIL   Blood Culture 1 [[id number]] Collected: 02/09/23 1805   Order Status: Completed Specimen: Blood Updated: 02/14/23 2143    Specimen Description . BLOOD    Special Requests RAR 12ML    Culture NO GROWTH 5 DAYS   Culture, Blood 2 [[id number]] Collected: 02/09/23 1820   Order Status: Completed Specimen: Blood Updated: 02/14/23 2143    Specimen Description . BLOOD    Special Requests RFA 12ML    Culture NO GROWTH 5 DAYS     Medications:      cefepime  2,000 mg IntraVENous Q12H    miconazole   Topical BID    vancomycin  1,500 mg IntraVENous Q24H    metroNIDAZOLE  500 mg Oral 3 times per day    sodium hypochlorite   Irrigation BID    lisinopril  20 mg Oral Daily    levothyroxine  112 mcg Oral QAM AC    sodium chloride flush  5-40 mL IntraVENous 2 times per day    enoxaparin  40 mg SubCUTAneous Daily    famotidine  20 mg Oral Daily    vancomycin (VANCOCIN) intermittent dosing (placeholder)   Other RX Placeholder       Electronically signed by Rohit Alvarez MD on 2/15/2023 at 3:21 PM      [facility]  Rohit Alvarez MD  [employer]  OFFICE: [phone]    Thank you for allowing us to participate in the care of this patient.  Please call with questions. This note is created with the assistance of a speech recognition program.  While intending to generate a document that actually reflects the content of the visit, the document can still have some errors including those of syntax and sound a like substitutions which may escape proof reading. In such instances, actual meaning can be extrapolated by contextual diversion.

## 2023-02-15 NOTE — PROGRESS NOTES
Patient incontinent of stool and soiled wound vac dressing. Wound vac dressing removed and wound cleansed with Dakins. Wet to dry dressing in place. Wound care notified and will be in today to place new wound vac dressing.

## 2023-02-15 NOTE — PLAN OF CARE
Problem: Discharge Planning  Goal: Discharge to home or other facility with appropriate resources  2/15/2023 1452 by Milagros Zhang RN  Outcome: Progressing     Problem: Skin/Tissue Integrity  Goal: Absence of new skin breakdown  Description: 1. Monitor for areas of redness and/or skin breakdown  2. Assess vascular access sites hourly  3. Every 4-6 hours minimum:  Change oxygen saturation probe site  4. Every 4-6 hours:  If on nasal continuous positive airway pressure, respiratory therapy assess nares and determine need for appliance change or resting period.   2/15/2023 1452 by Milagros Zhang RN  Outcome: Progressing     Problem: Chronic Conditions and Co-morbidities  Goal: Patient's chronic conditions and co-morbidity symptoms are monitored and maintained or improved  2/15/2023 1452 by Milagros Zhang RN  Outcome: Progressing

## 2023-02-15 NOTE — PROGRESS NOTES
[facility] Pharmacokinetic Monitoring Service - Vancomycin    Consulting Provider: Laura Acevedo CNP  Indication: cellulitis/ infected decubitus ulcer crater   Target Concentration: Goal trough of 10-15 mg/L and AUC/JANET <500 mg*hr/L  Day of Therapy: 7  Additional Antimicrobials: cefepime/Flagyl    Pertinent Laboratory Values: Wt Readings from Last 1 Encounters:   02/15/23 131 lb (59.4 kg)     Temp Readings from Last 1 Encounters:   02/15/23 98.1 °F (36.7 °C) (Oral)     Estimated Creatinine Clearance: 71 mL/min (based on SCr of 0.53 mg/dL). Recent Labs     02/14/23  0714 02/15/23  0539   CREATININE 0.67 0.53   WBC 7.4 7.2         Pertinent Cultures:  Culture Date Source Results   2/13 Decubitus ulcer  PROTEUS MIRABILIUS - heavy growth   MRSA Nasal Swab: N/A. Non-respiratory infection. Recent vancomycin administrations                     vancomycin (VANCOCIN) 1,500 mg in sodium chloride 0.9 % 250 mL IVPB (mg) 1,500 mg New Bag 02/14/23 2240     1,500 mg New Bag 02/13/23 2201     1,500 mg New Bag 02/12/23 2236                      Plan:  Current dosing regimen is therapeutic, last levels were obtained on 2/12.   Continue current dose  Pharmacy will continue to monitor patient and adjust therapy as indicated    Thank you for the consult,  Jah Bey [facility]  2/15/2023 4:28 PM

## 2023-02-15 NOTE — PLAN OF CARE
Problem: Discharge Planning  Goal: Discharge to home or other facility with appropriate resources  Outcome: Progressing  Flowsheets (Taken 2/12/2023 0849 by Maria Teresa Mansfield, TIERRA)  Discharge to home or other facility with appropriate resources:   Identify barriers to discharge with patient and caregiver   Arrange for needed discharge resources and transportation as appropriate   Identify discharge learning needs (meds, wound care, etc)   Refer to discharge planning if patient needs post-hospital services based on physician order or complex needs related to functional status, cognitive ability or social support system     Problem: Skin/Tissue Integrity  Goal: Absence of new skin breakdown  Description: 1. Monitor for areas of redness and/or skin breakdown  2. Assess vascular access sites hourly  3. Every 4-6 hours minimum:  Change oxygen saturation probe site  4. Every 4-6 hours:  If on nasal continuous positive airway pressure, respiratory therapy assess nares and determine need for appliance change or resting period.   2/15/2023 0100 by Kahlil Matthew RN  Outcome: Progressing  Note: Skin assessment completed and documented  Aquiles scale updated  Relieve pressure to bony prominences  Avoid shearing  Assess s/sx of infection   Turned q 2 hours  Miguelina care given and barrier cream applied to prevent breakdown  Heels elevated  Structural intactness and normal physiological function of skin and mucous membranes    2/14/2023 1338 by Angela Melo RN  Outcome: Progressing     Problem: Safety - Adult  Goal: Free from fall injury  2/15/2023 0100 by Kahlil Matthew RN  Outcome: Progressing  Note: Proper pt identification  Hourly rounding performed  Anticipatory needs met  Non-skid socks worn  Proper transferring technique  2/4 side rails up  Personal necessities within reach  Bed low and locked  Call light in reach  Proper lighting  Room free of clutter      2/14/2023 1338 by Angela Melo RN  Outcome: Progressing Problem: ABCDS Injury Assessment  Goal: Absence of physical injury  2/15/2023 0100 by Teri Covarrubias RN  Outcome: Progressing  Flowsheets (Taken 2/12/2023 2022 by Iban Sanz RN)  Absence of Physical Injury: Implement safety measures based on patient assessment  2/14/2023 1338 by Darylene Lawyer, RN  Outcome: Progressing     Problem: Chronic Conditions and Co-morbidities  Goal: Patient's chronic conditions and co-morbidity symptoms are monitored and maintained or improved  2/15/2023 0100 by Teri Covarrubias RN  Outcome: Progressing  Flowsheets (Taken 2/12/2023 0849 by Iban Sanz RN)  Care Plan - Patient's Chronic Conditions and Co-Morbidity Symptoms are Monitored and Maintained or Improved:   Monitor and assess patient's chronic conditions and comorbid symptoms for stability, deterioration, or improvement   Collaborate with multidisciplinary team to address chronic and comorbid conditions and prevent exacerbation or deterioration   Update acute care plan with appropriate goals if chronic or comorbid symptoms are exacerbated and prevent overall improvement and discharge  2/14/2023 1338 by Darylene Lawyer, RN  Outcome: Progressing     Problem: Neurosensory - Adult  Goal: Achieves stable or improved neurological status  2/15/2023 0100 by Teri Covarrubias RN  Outcome: Progressing  Flowsheets (Taken 2/12/2023 0849 by Iban Sanz RN)  Achieves stable or improved neurological status:   Assess for and report changes in neurological status   Maintain blood pressure and fluid volume within ordered parameters to optimize cerebral perfusion and minimize risk of hemorrhage  2/14/2023 1338 by Darylene Lawyer, RN  Outcome: Progressing     Problem: Cardiovascular - Adult  Goal: Maintains optimal cardiac output and hemodynamic stability  2/15/2023 0100 by Teri Covarrubias RN  Outcome: Progressing  Flowsheets (Taken 2/12/2023 0849 by Iban Sanz RN)  Maintains optimal cardiac output and hemodynamic stability: Monitor blood pressure and heart rate   Monitor urine output and notify Licensed Independent Practitioner for values outside of normal range   Assess for signs of decreased cardiac output   Administer fluid and/or volume expanders as ordered  2/14/2023 1338 by Rachel Adair RN  Outcome: Progressing  Goal: Absence of cardiac dysrhythmias or at baseline  2/15/2023 0100 by Caitlyn Bergeron RN  Outcome: Progressing  Flowsheets (Taken 2/12/2023 0849 by Mikayla Miller RN)  Absence of cardiac dysrhythmias or at baseline:   Monitor cardiac rate and rhythm   Assess for signs of decreased cardiac output  2/14/2023 1338 by Rachel Adair RN  Outcome: Progressing     Problem: Skin/Tissue Integrity - Adult  Goal: Skin integrity remains intact  Outcome: Progressing  Flowsheets (Taken 2/12/2023 2015 by Audrey Callahan RN)  Skin Integrity Remains Intact: Monitor for areas of redness and/or skin breakdown  Goal: Incisions, wounds, or drain sites healing without S/S of infection  2/15/2023 0100 by Caitlyn Bergeron RN  Outcome: Progressing  Flowsheets (Taken 2/12/2023 2015 by Audrey Callahan RN)  Incisions, Wounds, or Drain Sites Healing Without Sign and Symptoms of Infection: Implement wound care per orders  2/14/2023 1338 by Rachel Adair RN  Outcome: Progressing  Goal: Oral mucous membranes remain intact  2/15/2023 0100 by Caitlyn Bergeron RN  Outcome: Progressing  Flowsheets (Taken 2/12/2023 0849 by Mikayla Miller RN)  Oral Mucous Membranes Remain Intact:   Assess oral mucosa and hygiene practices   Implement preventative oral hygiene regimen  2/14/2023 1338 by Rachel Adair RN  Outcome: Progressing     Problem: Musculoskeletal - Adult  Goal: Return mobility to safest level of function  Outcome: Progressing  Flowsheets (Taken 2/12/2023 0849 by Mikayla Miller RN)  Return Mobility to Safest Level of Function:   Assess patient stability and activity tolerance for standing, transferring and ambulating with or without assistive devices   Assist with transfers and ambulation using safe patient handling equipment as needed   Ensure adequate protection for wounds/incisions during mobilization   Obtain physical therapy/occupational therapy consults as needed   Instruct patient/family in ordered activity level  Goal: Return ADL status to a safe level of function  Outcome: Progressing  Flowsheets (Taken 2/12/2023 0849 by Iban Sanz RN)  Return ADL Status to a Safe Level of Function:   Administer medication as ordered   Assess activities of daily living deficits and provide assistive devices as needed   Obtain physical therapy/occupational therapy consults as needed   Assist and instruct patient to increase activity and self care as tolerated     Problem: Genitourinary - Adult  Goal: Absence of urinary retention  Outcome: Progressing  Flowsheets (Taken 2/12/2023 0849 by Iban Sanz RN)  Absence of urinary retention:   Assess patients ability to void and empty bladder   Monitor intake/output and perform bladder scan as needed  Goal: Urinary catheter remains patent  2/15/2023 0100 by Teri Covarrubias RN  Outcome: Progressing  Flowsheets (Taken 2/12/2023 2022 by Iban Sanz RN)  Urinary catheter remains patent: Assess patency of urinary catheter  2/14/2023 1338 by Darylene Lawyer, RN  Outcome: Progressing     Problem: Pain  Goal: Verbalizes/displays adequate comfort level or baseline comfort level  2/15/2023 0100 by Teri Covarrubias RN  Outcome: Progressing  Note: Pain level assessment complete.    Patient educated on pain scale and control interventions  PRN pain medication given per patient request  Patient instructed to call out with new onset of pain or unrelieved pain    2/14/2023 1338 by Darylene Lawyer, RN  Outcome: Progressing     Problem: Nutrition Deficit:  Goal: Optimize nutritional status  Outcome: Progressing  Flowsheets (Taken 2/15/2023 0100)  Nutrient intake appropriate for improving, restoring, or maintaining nutritional needs:   Assess nutritional status and recommend course of action   Monitor oral intake, labs, and treatment plans

## 2023-02-15 NOTE — PROGRESS NOTES
[address] [id number] Continence Nurse  Consult Note       NAME:  Bela Bob  MEDICAL RECORD NUMBER:  [id number]  AGE: 81 y.o. GENDER: female  : 1935  TODAY'S DATE:  2/15/2023    Subjective:      Bela Bob is a 81 y.o. female with inpatient referral to [facility] for:  Right buttock wound near gluteal fold/coccyx area      Wound Identification:  Wound Type: pressure  Contributing Factors: chronic pressure, decreased mobility, and shear force    Wound History: the wound was surgically debrided yesterday by Dr. Tico Singh under general anesthesia  Current Wound Care Treatment:  NPWT initiated in surgery- the dressing failed this morning per nursing due to a very large BM which soiled the wound     Patient Goal of Care:  [x] Wound Healing  [] Odor Control  [] Palliative Care  [] Pain Control   [x] Other: infection mgmt/prevention        PAST MEDICAL HISTORY        Diagnosis Date    Anemia     Gastroesophageal reflux disease without esophagitis 2017    Hard of hearing     Hyperlipidemia     Hypertension     Hypothyroidism     TIA (transient ischemic attack)        PAST SURGICAL HISTORY    Past Surgical History:   Procedure Laterality Date    BACK SURGERY N/A 2023    BACK WOUND DEBRIDEMENT performed by Annie Montes DO at [address] [place] [address] Avenue Right     TONSILLECTOMY      age 16       FAMILY HISTORY    Family History   Problem Relation Age of Onset    Cancer Mother         breast    Other Father         ulcers       SOCIAL HISTORY    Social History     Tobacco Use    Smoking status: Former     Types: Cigarettes     Quit date: 1970     Years since quittin.6    Smokeless tobacco: Never   Substance Use Topics    Alcohol use: No     Alcohol/week: 0.0 standard drinks    Drug use: No         ALLERGIES    Allergies   Allergen Reactions    Gluten Meal     Penicillins        HOME MEDICATIONS  Prior to Admission medications    Medication Sig Start Date End Date Taking? Authorizing Provider   levothyroxine (SYNTHROID) 112 MCG tablet Take 112 mcg by mouth every morning (before breakfast)   Yes Historical Provider, MD   sodium chloride (OCEAN, BABY AYR) 0.65 % nasal spray 1 spray by Nasal route as needed for Congestion   Yes Historical Provider, MD   magnesium cl-calcium carbonate (SLOW-MAG) 71.5-119 MG TBEC tablet Take 1 tablet by mouth daily   Yes Historical Provider, MD   oxyCODONE (ROXICODONE) 5 MG immediate release tablet Take 2.5 mg by mouth every 8 hours as needed for Pain. Yes Historical Provider, MD   collagenase 250 UNIT/GM ointment Apply topically daily TO COCCYX WOUND.    Yes Historical Provider, MD   acetaminophen (TYLENOL) 500 MG tablet Take 500 mg by mouth every 6 hours as needed for Pain or Fever   Yes Historical Provider, MD   fosinopril (MONOPRIL) 20 MG tablet TAKE 1 TABLET BY MOUTH EVERY DAY  Patient taking differently: Take 20 mg by mouth daily TAKE 1 TABLET BY MOUTH EVERY DAY 11/10/19   Rohit Carver, APRN - CNP   Multiple Vitamins-Minerals (CENTRUM SILVER PO) Take 1 tablet by mouth daily (before dinner)    Historical Provider, MD       CURRENT MEDICATIONS:  Current Facility-Administered Medications   Medication Dose Route Frequency Provider Last Rate Last Admin    cefepime (MAXIPIME) 2,000 mg in sodium chloride 0.9 % 50 mL IVPB (mini-bag)  2,000 mg IntraVENous Q12H Armenta Flood, DO   Stopped at 02/15/23 0746    miconazole (MICOTIN) 2 % powder   Topical BID Armenta Flood, DO   Given at 02/15/23 4935    metoprolol (LOPRESSOR) injection 5 mg  5 mg IntraVENous Q6H PRN Armenta Flood, DO   5 mg at 02/14/23 2035    vancomycin (VANCOCIN) 1,500 mg in sodium chloride 0.9 % 250 mL IVPB  1,500 mg IntraVENous Q24H Armenta Flood, DO   Stopped at 02/15/23 0117    metroNIDAZOLE (FLAGYL) tablet 500 mg  500 mg Oral 3 times per day Armenta Flood, DO   500 mg at 02/15/23 4265    sodium hypochlorite (DAKINS) 0.125 % external solution   Irrigation BID Armenta Flood, DO   Given at 02/13/23 0902    lisinopril (PRINIVIL;ZESTRIL) tablet 20 mg  20 mg Oral Daily Andrey Soraida, DO   20 mg at 02/15/23 4068    levothyroxine (SYNTHROID) tablet 112 mcg  112 mcg Oral QAM AC Andrey Soraida, DO   112 mcg at 02/15/23 8146    oxyCODONE (ROXICODONE) immediate release tablet 2.5 mg  2.5 mg Oral Q8H PRN Andrey Soraida, DO        0.9 % sodium chloride infusion   IntraVENous Continuous Andrey Soraida, DO 75 mL/hr at 02/15/23 0804 New Bag at 02/15/23 0804    sodium chloride flush 0.9 % injection 5-40 mL  5-40 mL IntraVENous 2 times per day Andrey Soraida, DO   10 mL at 02/11/23 0913    sodium chloride flush 0.9 % injection 10 mL  10 mL IntraVENous PRN Andrey Soraida, DO        0.9 % sodium chloride infusion   IntraVENous PRN Andrey Soraida, DO 5 mL/hr at 02/14/23 6420 New Bag at 02/14/23 0342    potassium chloride (KLOR-CON M) extended release tablet 40 mEq  40 mEq Oral PRN Andrey Soraida, DO   40 mEq at 02/12/23 4769    Or    potassium bicarb-citric acid (EFFER-K) effervescent tablet 40 mEq  40 mEq Oral PRN Andrey Soraida, DO        Or    potassium chloride 10 mEq/100 mL IVPB (Peripheral Line)  10 mEq IntraVENous PRN Andrey Soraida, DO        magnesium sulfate 1000 mg in dextrose 5% 100 mL IVPB  1,000 mg IntraVENous PRN Andrey Soraida, DO   Stopped at 02/12/23 1819    enoxaparin (LOVENOX) injection 40 mg  40 mg SubCUTAneous Daily Andrey Soraida, DO   40 mg at 02/15/23 9833    ondansetron (ZOFRAN-ODT) disintegrating tablet 4 mg  4 mg Oral Q8H PRN Andrey Soraida, DO        Or    ondansetron [facility]) injection 4 mg  4 mg IntraVENous Q6H PRN Andrey Soraida, DO        magnesium hydroxide (MILK OF MAGNESIA) 400 MG/5ML suspension 30 mL  30 mL Oral Daily PRN Andrey Soraida, DO        famotidine (PEPCID) tablet 20 mg  20 mg Oral Daily Andrey Soraida, DO   20 mg at 02/15/23 9775    acetaminophen (TYLENOL) tablet 650 mg  650 mg Oral Q6H PRN Andrey Soraida, DO   650 mg at 02/15/23 0340    Or    acetaminophen (TYLENOL) suppository 650 mg  650 mg Rectal Q6H PRN Andrey Soraida, DO DISPLAY PLAN FREE TEXT DISPLAY PLAN FREE TEXT DISPLAY PLAN FREE TEXT DISPLAY PLAN FREE TEXT vancomycin (VANCOCIN) intermittent dosing (placeholder)   Other RX Placeholder Adriel Lindquist,              Review of Systems:  Unable to complete due to mentation and no family present      Objective:      BP (!) 167/75   Pulse (!) 113   Temp 98.8 °F (37.1 °C) (Axillary)   Resp 17   Ht 5' 6\" (1.676 m)   Wt 131 lb (59.4 kg)   SpO2 98%   BMI 21.14 kg/m²       LABS    CBC:   Lab Results   Component Value Date/Time    WBC 7.2 02/15/2023 05:39 AM    RBC 2.94 02/15/2023 05:39 AM    HGB 8.7 02/15/2023 05:39 AM     SED RATE:   Lab Results   Component Value Date    SEDRATE 82 (H) 02/09/2023       CMP:  Albumin:    Lab Results   Component Value Date/Time    LABALBU 2.7 02/11/2023 08:49 AM     PT/INR:    Lab Results   Component Value Date/Time    PROTIME 15.0 02/10/2023 05:34 AM    INR 1.2 02/10/2023 05:34 AM     HgBA1c:    Lab Results   Component Value Date/Time    LABA1C 5.6 10/15/2019 02:55 PM     PTT: No components found for: LABPTT      PHYSICAL EXAM    General Appearance: alert and oriented to person, place and time, well-developed and well-nourished, in no acute distress  Head: normocephalic and atraumatic  Pulmonary/Chest: normal air movement, no respiratory distress  Skin: large open wound. Wound bed primarily slough covered, some pink granulation noted. Stool noted in wound, irrigated well with Dakins solution and saline.   Cardiovascular/Circulation: minimal edema, no cyanosis, palpable peripheral pulses  Extremities: no cyanosis and no clubbing  Musculoskeletal: no joint deformity or tenderness, no extremity amputations  Neurologic: gait not evaluated this visit, coordination normal and speech normal      Assessment:       Aquiles Risk Score: Aquiles Scale Score: 13    Patient Active Problem List   Diagnosis Code    Mixed hyperlipidemia E78.2    Essential hypertension I10    Hypothyroidism E03.9    Vitamin D deficiency E55.9    Hallucinations, visual R44.1    Vitamin B12 deficiency E53.8    History of  DISPLAY PLAN FREE TEXT DISPLAY PLAN FREE TEXT DISPLAY PLAN FREE TEXT DISPLAY PLAN FREE TEXT DISPLAY PLAN FREE TEXT DISPLAY PLAN FREE TEXT gallstones Z87.19    COPD, mild (HCC) J44.9    Basal cell carcinoma of back C44.519    Arthritis of left shoulder region M19.012    Gastroesophageal reflux disease without esophagitis K21.9    Celiac disease K90.0    Duodenitis K29.80    Type 2 diabetes mellitus without complication, without long-term current use of insulin (HCC) E11.9    TIA (transient ischemic attack) G45.9    Dysarthria R47.1    Cellulitis L03.90    Pressure ulcer of coccygeal region, 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         Measurements:  Negative Pressure Wound Therapy Buttocks Right (Active)   $ Standard NPWT <=50 sq cm PER TX $ Yes 02/15/23 1316   Unit Type KCI 02/15/23 1316   Dressing Type White Foam;Black Foam 02/15/23 1316   Number of pieces used 4 02/15/23 1316   Cycle On;Continuous 02/15/23 1316   Target Pressure (mmHg) 125 02/15/23 1316   Dressing Status Other (Comment) 02/15/23 0921   Drainage Amount Small 02/15/23 0347   Miguelina-wound Assessment Other (Comment) 02/15/23 0855   Number of days: 2       Wound 02/09/23 Coccyx (Active)   Wound Image   02/15/23 1316   Dressing Status Dry; Intact; Clean 02/15/23 0347   Wound Cleansed Cleansed with saline 02/12/23 2015   Dressing/Treatment Moist to dry;ABD 02/13/23 0905   Dressing Change Due 02/13/23 02/12/23 2015   Wound Length (cm) 6 cm 02/15/23 1316   Wound Width (cm) 3.2 cm 02/15/23 1316   Wound Depth (cm) 3.8 cm 02/15/23 1316   Wound Surface Area (cm^2) 19.2 cm^2 02/15/23 1316   Wound Volume (cm^3) 72.96 cm^3 02/15/23 1316   Undermining Starts ___ O'Clock 6 02/15/23 1316   Undermining Ends___ O'Clock 2 02/15/23 1316   Undermining Maxium Distance (cm) [email or web address] 02/15/23 1316   Wound Assessment Pink/red;Slough 02/15/23 1316   Drainage Amount Moderate 02/15/23 1316   Drainage Description Serosanguinous 02/15/23 1316   Odor None 02/15/23 1316   Miguelina-wound Assessment Blanchable erythema 02/15/23 1316   Margins Unattached edges; Undefined edges 02/15/23 1316   Wound Thickness Description not for Pressure Injury DISPLAY PLAN FREE TEXT Full thickness 02/15/23 1316   Number of days: 5         Plan:     Plan of Care:     -Wound vac dressing reapplied using a bridge to the left hip for sensatrac pad offloading.     -Will plan next dressing change for Friday    -GERBER updated    -F/U outpatient wound care center      -Turn every 2 hours    -Float heels off of bed with pillows under calves. If needed- use offloading boots.    -Routine incontinence care with foaming cleanser and zinc oxide cream. Apply zinc oxide cream twice daily and prn incontinence. Use moisture wicking under pad (one layer only). -Waffle mattress overlay. Check inflation each shift by sliding hand under the air overlay. Feel for the patient's heaviest/ most dependant body part. Ideally 1/2 to 1\" of air will be between your hand and the patient's body. Add air prn. Specialty Bed Required : Yes   [] Low Air Loss   [] Pressure Redistribution  [] Fluid Immersion  [] Bariatric  [] Total Pressure Relief  [] Other:     Current Diet: ADULT ORAL NUTRITION SUPPLEMENT; Breakfast, Lunch, Dinner; Standard High Calorie/High Protein Oral Supplement  ADULT DIET;  Regular; Gluten Free    Discharge Plan:  Placement for patient upon discharge: skilled nursing   Patient appropriate for Outpatient [address]: Yes    Patient/Caregiver Teaching:  Level of patientunderstanding able to:     [x] Indicates understanding       [x] Needs reinforcement  [] Unsuccessful      [] Verbal Understanding  [] Demonstrated understanding       [] No evidence of learning  [] Refused teaching         [] N/A       Electronically signed by MUMTAZ Hammond CNP on  2/15/2023 at 1:19 PM

## 2023-02-15 NOTE — DISCHARGE INSTR - COC
Continuity of Care Form    Patient Name: Benji Karimi   :  [id number]  MRN:  [id number]    Admit date:  2023  Discharge date:  ***    Code Status Order: Full Code   Advance Directives:     Admitting Physician:  Ilya Rahman MD  PCP: Lennox Penta    Discharging Nurse: [facility] Unit/Room#:   Discharging Unit Phone Number: [phone]    Emergency Contact:   Extended Emergency Contact Information  Primary Emergency Contact: Jason Lopez   Address: [place]  Home Phone: [phone]  Relation: Child    Past Surgical History:  Past Surgical History:   Procedure Laterality Date    BACK SURGERY N/A 2023    BACK WOUND DEBRIDEMENT performed by Marry Moctezuma DO at [address] Right     TONSILLECTOMY      age 16       Immunization History:   Immunization History   Administered Date(s) Administered    COVID-19, MODERNA BLUE border, Primary or Immunocompromised, (age 12y+), IM, 100 mcg/0.5mL 2021, 2021, 2022    Influenza Virus Vaccine 10/21/2011, 2012    Influenza, FLUAD, (age 72 y+), Adjuvanted, 0.5mL 2021    Influenza, FLUZONE (age 72 y+), High Dose, 0.7mL 10/30/2020    Influenza, High Dose (Fluzone 65 yrs and older) 2015, 2016, 2017, 2018    Influenza, Triv, inactivated, subunit, adjuvanted, IM (Fluad 65 yrs and older) 10/29/2019    PPD Test 2022, 2022    Pneumococcal Conjugate 13-valent ([id number]) 2017    Pneumococcal Conjugate Vaccine 10/01/2003    Pneumococcal Polysaccharide ([id number]) 10/21/2011, 2018    Td (Adult), 2 Lf Tetanus Toxoid, Pf (Td, Absorbed) 2009       Active Problems:  Patient Active Problem List   Diagnosis Code    Mixed hyperlipidemia E78.2    Essential hypertension I10    Hypothyroidism E03.9    Vitamin D deficiency E55.9    Hallucinations, visual R44.1    Vitamin B12 deficiency E53.8    History of gallstones Z87.19    COPD, mild (HCC) J44.9    Basal cell carcinoma of back C44.519    Arthritis of left shoulder region M19.012    Gastroesophageal reflux disease without esophagitis K21.9    Celiac disease K90.0    Duodenitis K29.80    Type 2 diabetes mellitus without complication, without long-term current use of insulin (HCC) E11.9    TIA (transient ischemic attack) G45.9    Dysarthria R47.1    Cellulitis L03.90    Pressure ulcer of coccygeal region, stage 4 ([facility]) L89.154    Wound infection T14. 8XXA, L08.9       Isolation/Infection:   Isolation            No Isolation          Patient Infection Status       None to display            Nurse Assessment:  Last Vital Signs: BP (!) 167/75   Pulse (!) 113   Temp 98.8 °F (37.1 °C) (Axillary)   Resp 17   Ht 5' 6\" (1.676 m)   Wt 131 lb (59.4 kg)   SpO2 98%   BMI 21.14 kg/m²     Last documented pain score (0-10 scale): Pain Level: 0  Last Weight:   Wt Readings from Last 1 Encounters:   02/15/23 131 lb (59.4 kg)     Mental Status:  oriented and alert    IV Access:  - PICC - site  R Basilic, insertion date: 2/16/22    Nursing Mobility/ADLs:  Walking   Dependent  Transfer  Dependent  Bathing  Dependent  Dressing  Dependent  Toileting  Assisted  Feeding  Assisted  Med Admin  Assisted  Med Delivery   crushed and prefers mixed with applesauce    Wound Care Documentation and Therapy:  Negative Pressure Wound Therapy Buttocks Right (Active)   $ Standard NPWT <=50 sq cm PER TX $ Yes 02/15/23 1316   Unit Type KCI 02/15/23 1316   Dressing Type White Foam;Black Foam 02/15/23 1316   Number of pieces used 4 02/15/23 1316   Cycle On;Continuous 02/15/23 1316   Target Pressure (mmHg) 125 02/15/23 1316   Dressing Status Other (Comment) 02/15/23 0921   Drainage Amount Small 02/15/23 0347   Miguelina-wound Assessment Other (Comment) 02/15/23 0855   Number of days: 2       Wound 02/09/23 Coccyx (Active)   Wound Image   02/15/23 1316   Dressing Status Dry; Intact; Clean 02/15/23 0347   Wound Cleansed Cleansed with saline 02/12/23 2015 Dressing/Treatment Moist to dry;ABD 02/13/23 0905   Dressing Change Due 02/13/23 02/12/23 2015   Wound Length (cm) 6 cm 02/15/23 1316   Wound Width (cm) 3.2 cm 02/15/23 1316   Wound Depth (cm) 3.8 cm 02/15/23 1316   Wound Surface Area (cm^2) 19.2 cm^2 02/15/23 1316   Wound Volume (cm^3) 72.96 cm^3 02/15/23 1316   Undermining Starts ___ O'Clock 6 02/15/23 1316   Undermining Ends___ O'Clock 2 02/15/23 1316   Undermining Maxium Distance (cm) [email or web address] 02/15/23 1316   Wound Assessment Pink/red;Slough 02/15/23 1316   Drainage Amount Moderate 02/15/23 1316   Drainage Description Serosanguinous 02/15/23 1316   Odor None 02/15/23 1316   Miguelina-wound Assessment Blanchable erythema 02/15/23 1316   Margins Unattached edges; Undefined edges 02/15/23 1316   Wound Thickness Description not for Pressure Injury Full thickness 02/15/23 1316   Number of days: 5      Right buttock/coccyx wound care:  cleanse with saline, pat dry. Apply skin barrier wipe to wound edges/perimeter. Use Wound vac therapy with offloading of suction pad with bridge dressing. Continuous therapy using white foam to wound base then black foam. 150mmHg.     -Follow up [facility] [address]      Elimination:  Continence: Bowel: No  Bladder: No  Urinary Catheter: Insertion Date: 2/12/23    Colostomy/Ileostomy/Ileal Conduit: No       Date of Last BM: 2/15/23    Intake/Output Summary (Last 24 hours) at 2/15/2023 1329  Last data filed at 2/15/2023 4566  Gross per 24 hour   Intake --   Output 2350 ml   Net -2350 ml     I/O last 3 completed shifts:  In: -   Out: 5050 [Urine:5050]    Safety Concerns:      At Risk for Falls and dementia    Impairments/Disabilities:      Hearing and cognitive    Nutrition Therapy:  Current Nutrition Therapy:   - Oral Diet:  General and dietary supplements, gluten free    Routes of Feeding: Oral  Liquids: No Restrictions  Daily Fluid Restriction: no  Last Modified Barium Swallow with Video (Video Swallowing Test): not done    Treatments at the Time of Hospital Discharge:   Respiratory Treatments: ***  Oxygen Therapy:  is not on home oxygen therapy. Ventilator:    - No ventilator support    Rehab Therapies: Physical Therapy and Occupational Therapy  Weight Bearing Status/Restrictions: No weight bearing restrictions  Other Medical Equipment (for information only, NOT a DME order):  {EQUIPMENT:[id number]}  Other Treatments: dressing changes, wound vac, assessments    Patient's personal belongings (please select all that are sent with patient):  Glasses    RN SIGNATURE:  Electronically signed by Linden Rogers RN on 2/17/23 at 10:17 AM EST    CASE MANAGEMENT/SOCIAL WORK SECTION    Inpatient Status Date: ***    Readmission Risk Assessment Score:  Readmission Risk              Risk of Unplanned Readmission:  14           Discharging to Facility/ Agency   Name:  [facility]  Address: [address] [place] [address]  Phone: [phone]  Fax: [phone]      / signature: Electronically signed by TAMANNA Rossi on 2/16/23 at 3:29 PM EST    PHYSICIAN SECTION    Prognosis: Fair    Condition at Discharge: Stable    Rehab Potential (if transferring to Rehab): Fair    Recommended Labs or Other Treatments After Discharge:   Check CBC, BMP, CRP on Mondays  Schedule for follow-up visit in 3 weeks  Fax results to Jean Torres MD FAX: [phone]     Physician Certification: I certify the above information and transfer of Delmi Ty  is necessary for the continuing treatment of the diagnosis listed and that she requires [facility] for greater 30 days.      Update Admission H&P: No change in H&P    PHYSICIAN SIGNATURE:  Electronically signed by Vu Clement MD on 2/16/23 at 6:54 PM EST

## 2023-02-16 LAB
ABSOLUTE EOS #: 0.25 K/UL (ref 0–0.44)
ABSOLUTE IMMATURE GRANULOCYTE: 0.08 K/UL (ref 0–0.3)
ABSOLUTE LYMPH #: 1.29 K/UL (ref 1.1–3.7)
ABSOLUTE MONO #: 0.97 K/UL (ref 0.1–1.2)
ANION GAP SERPL CALCULATED.3IONS-SCNC: 8 MMOL/L (ref 9–17)
BASOPHILS # BLD: 1 % (ref 0–2)
BASOPHILS ABSOLUTE: 0.04 K/UL (ref 0–0.2)
BUN SERPL-MCNC: 14 MG/DL (ref 8–23)
BUN/CREAT BLD: 30 (ref 9–20)
CALCIUM SERPL-MCNC: 8.7 MG/DL (ref 8.6–10.4)
CHLORIDE SERPL-SCNC: 112 MMOL/L (ref 98–107)
CO2 SERPL-SCNC: 23 MMOL/L (ref 20–31)
CREAT SERPL-MCNC: 0.46 MG/DL (ref 0.5–0.9)
EOSINOPHILS RELATIVE PERCENT: 3 % (ref 1–4)
GFR SERPL CREATININE-BSD FRML MDRD: >60 ML/MIN/1.73M2
GLUCOSE SERPL-MCNC: 125 MG/DL (ref 70–99)
HCT VFR BLD AUTO: 28.2 % (ref 36.3–47.1)
HGB BLD-MCNC: 8.6 G/DL (ref 11.9–15.1)
IMMATURE GRANULOCYTES: 1 %
LYMPHOCYTES # BLD: 18 % (ref 24–43)
MCH RBC QN AUTO: 29.3 PG (ref 25.2–33.5)
MCHC RBC AUTO-ENTMCNC: 30.5 G/DL (ref 28.4–34.8)
MCV RBC AUTO: 95.9 FL (ref 82.6–102.9)
MONOCYTES # BLD: 13 % (ref 3–12)
NRBC AUTOMATED: 0 PER 100 WBC
PDW BLD-RTO: 13.3 % (ref 11.8–14.4)
PLATELET # BLD AUTO: 361 K/UL (ref 138–453)
PMV BLD AUTO: 9.3 FL (ref 8.1–13.5)
POTASSIUM SERPL-SCNC: 3.8 MMOL/L (ref 3.7–5.3)
RBC # BLD: 2.94 M/UL (ref 3.95–5.11)
SEG NEUTROPHILS: 64 % (ref 36–65)
SEGMENTED NEUTROPHILS ABSOLUTE COUNT: 4.68 K/UL (ref 1.5–8.1)
SODIUM SERPL-SCNC: 143 MMOL/L (ref 135–144)
WBC # BLD AUTO: 7.3 K/UL (ref 3.5–11.3)

## 2023-02-16 PROCEDURE — 6370000000 HC RX 637 (ALT 250 FOR IP): Performed by: SURGERY

## 2023-02-16 PROCEDURE — 85025 COMPLETE CBC W/AUTO DIFF WBC: CPT

## 2023-02-16 PROCEDURE — 2580000003 HC RX 258: Performed by: SURGERY

## 2023-02-16 PROCEDURE — 36415 COLL VENOUS BLD VENIPUNCTURE: CPT

## 2023-02-16 PROCEDURE — 99232 SBSQ HOSP IP/OBS MODERATE 35: CPT | Performed by: INTERNAL MEDICINE

## 2023-02-16 PROCEDURE — 1200000000 HC SEMI PRIVATE

## 2023-02-16 PROCEDURE — 80048 BASIC METABOLIC PNL TOTAL CA: CPT

## 2023-02-16 PROCEDURE — 6360000002 HC RX W HCPCS: Performed by: INTERNAL MEDICINE

## 2023-02-16 PROCEDURE — 2580000003 HC RX 258: Performed by: INTERNAL MEDICINE

## 2023-02-16 PROCEDURE — 6370000000 HC RX 637 (ALT 250 FOR IP): Performed by: INTERNAL MEDICINE

## 2023-02-16 PROCEDURE — 99232 SBSQ HOSP IP/OBS MODERATE 35: CPT

## 2023-02-16 RX ORDER — FAMOTIDINE 20 MG/1
20 TABLET, FILM COATED ORAL DAILY
Qty: 60 TABLET | Refills: 3 | Status: SHIPPED | OUTPATIENT
Start: 2023-02-17

## 2023-02-16 RX ORDER — METRONIDAZOLE 500 MG/1
500 TABLET ORAL EVERY 8 HOURS SCHEDULED
Qty: 2 TABLET | Refills: 0 | Status: SHIPPED | OUTPATIENT
Start: 2023-02-16 | End: 2023-02-17

## 2023-02-16 RX ORDER — METRONIDAZOLE 500 MG/1
500 TABLET ORAL EVERY 8 HOURS SCHEDULED
Status: COMPLETED | OUTPATIENT
Start: 2023-02-16 | End: 2023-02-17

## 2023-02-16 RX ORDER — OXYCODONE HYDROCHLORIDE 5 MG/1
2.5 TABLET ORAL EVERY 8 HOURS PRN
Qty: 5 TABLET | Refills: 0 | Status: SHIPPED | OUTPATIENT
Start: 2023-02-16 | End: 2023-02-19

## 2023-02-16 RX ORDER — SODIUM HYPOCHLORITE 1.25 MG/ML
SOLUTION TOPICAL 2 TIMES DAILY
Qty: 1 EACH | Refills: 0 | Status: SHIPPED | OUTPATIENT
Start: 2023-02-16

## 2023-02-16 RX ADMIN — CEFTRIAXONE 2000 MG: 2 INJECTION, POWDER, FOR SOLUTION INTRAMUSCULAR; INTRAVENOUS at 19:41

## 2023-02-16 RX ADMIN — SODIUM CHLORIDE, PRESERVATIVE FREE 10 ML: 5 INJECTION INTRAVENOUS at 19:42

## 2023-02-16 RX ADMIN — SODIUM CHLORIDE: 9 INJECTION, SOLUTION INTRAVENOUS at 01:38

## 2023-02-16 RX ADMIN — LEVOTHYROXINE SODIUM 112 MCG: 112 TABLET ORAL at 06:26

## 2023-02-16 RX ADMIN — FAMOTIDINE 20 MG: 20 TABLET, FILM COATED ORAL at 10:01

## 2023-02-16 RX ADMIN — METRONIDAZOLE 500 MG: 500 TABLET ORAL at 06:26

## 2023-02-16 RX ADMIN — METRONIDAZOLE 500 MG: 500 TABLET ORAL at 14:53

## 2023-02-16 RX ADMIN — LISINOPRIL 20 MG: 20 TABLET ORAL at 10:01

## 2023-02-16 RX ADMIN — METRONIDAZOLE 500 MG: 500 TABLET ORAL at 21:41

## 2023-02-16 RX ADMIN — ANTI-FUNGAL POWDER MICONAZOLE NITRATE TALC FREE: 1.42 POWDER TOPICAL at 10:01

## 2023-02-16 RX ADMIN — ANTI-FUNGAL POWDER MICONAZOLE NITRATE TALC FREE: 1.42 POWDER TOPICAL at 19:41

## 2023-02-16 RX ADMIN — ACETAMINOPHEN 650 MG: 325 TABLET ORAL at 14:56

## 2023-02-16 ASSESSMENT — PAIN DESCRIPTION - LOCATION: LOCATION: BUTTOCKS

## 2023-02-16 ASSESSMENT — PAIN SCALES - GENERAL
PAINLEVEL_OUTOF10: 4
PAINLEVEL_OUTOF10: 0

## 2023-02-16 NOTE — PROGRESS NOTES
Wound Ostomy Continence Nursing  Follow Up Visit      NAME:  Natalio DAYRON Silverio RECORD NUMBER:  [id number]  AGE: 81 y.o. GENDER: female  : 1935  TODAY'S DATE:  2023    Subjective        Follow up for mgmt of sacral wound with NPWT. Review of Systems:  Unable to complete, patient confused and no family present during visit. Objective     Vitals:  BP (!) 151/72   Pulse 96   Temp 97.7 °F (36.5 °C) (Oral)   Resp 16   Ht 5' 6\" (1.676 m)   Wt 136 lb (61.7 kg)   SpO2 97%   BMI 21.95 kg/m²    TEMPERATURE:  Current - Temp: 97.7 °F (36.5 °C);  Max - Temp  Av.3 °F (36.8 °C)  Min: 97.7 °F (36.5 °C)  Max: 98.8 °F (37.1 °C)    Aquiles Risk Score Aquiles Scale Score: 12    INTAKE/OUTPUT:      Intake/Output Summary (Last 24 hours) at 2023 0945  Last data filed at 2023 0419  Gross per 24 hour   Intake 5681.4 ml   Output 2649 ml   Net 3032.4 ml                 Physical Exam:  General Appearance: alert and oriented to person, place and time, well-developed and well-nourished, in no acute distress  Head: normocephalic and atraumatic  Pulmonary/Chest: normal air movement, no respiratory distress  Skin: Wound vac dressing intact and appears to be functioning properly  Cardiovascular/Circulation: minimal edema, no cyanosis, palpable peripheral pulses  Extremities: no cyanosis and no clubbing  Musculoskeletal: no joint deformity or tenderness, no extremity amputations  Neurologic: gait not evaluated this visit, coordination normal and speech normal      Data Review:  LABS:  CBC:   Recent Labs     23  0714 02/15/23  0539 23  0616   WBC 7.4 7.2 7.3   HGB 8.7* 8.7* 8.6*    313 361     BMP:    Lab Results   Component Value Date/Time     2023 06:16 AM    K 3.8 2023 06:16 AM     2023 06:16 AM    CO2 23 2023 06:16 AM    BUN 14 2023 06:16 AM    LABALBU 2.7 2023 08:49 AM    CREATININE 0.46 2023 06:16 AM    CALCIUM 8.7 2023 06:16 AM    GFRAA >60 08/14/2019 06:20 PM    LABGLOM >60 02/16/2023 06:16 AM    GLUCOSE 125 02/16/2023 06:16 AM     Coagulation: No results for input(s): APTT, PROT, INR in the last 72 hours. Flowsheet Documentation    Wound Care Documentation:  Negative Pressure Wound Therapy Buttocks Right (Active)   $ Standard NPWT <=50 sq cm PER TX $ Yes 02/15/23 1316   Unit Type KCI 02/15/23 1316   Dressing Type White Foam;Black Foam 02/15/23 1316   Number of pieces used 4 02/15/23 1316   Cycle On;Continuous 02/16/23 0424   Target Pressure (mmHg) 125 02/16/23 0424   Dressing Status Clean, dry & intact 02/16/23 0424   Drainage Amount Small 02/15/23 0347   Miguelina-wound Assessment Other (Comment) 02/16/23 0424   Number of days: 3       Wound 02/09/23 Coccyx (Active)   Wound Image   02/15/23 1316   Dressing Status Dry;Clean; Intact 02/16/23 0424   Wound Cleansed Cleansed with saline 02/12/23 2015   Dressing/Treatment Moist to dry;ABD 02/13/23 0905   Dressing Change Due 02/13/23 02/12/23 2015   Wound Length (cm) 6 cm 02/15/23 1316   Wound Width (cm) 3.2 cm 02/15/23 1316   Wound Depth (cm) 3.8 cm 02/15/23 1316   Wound Surface Area (cm^2) 19.2 cm^2 02/15/23 1316   Wound Volume (cm^3) 72.96 cm^3 02/15/23 1316   Undermining Starts ___ O'Clock 6 02/15/23 1316   Undermining Ends___ O'Clock 2 02/15/23 1316   Undermining Maxium Distance (cm) [email or web address] 02/15/23 1316   Wound Assessment Pink/red;Slough 02/15/23 1316   Drainage Amount Moderate 02/15/23 1316   Drainage Description Serosanguinous 02/15/23 1316   Odor None 02/15/23 1316   Miguelina-wound Assessment Other (Comment) 02/16/23 0424   Margins Unattached edges; Undefined edges 02/15/23 1316   Wound Thickness Description not for Pressure Injury Full thickness 02/15/23 1316   Number of days: 6         Assessment       Patient Active Problem List   Diagnosis    Mixed hyperlipidemia    Essential hypertension    Hypothyroidism    Vitamin D deficiency    Hallucinations, visual    Vitamin B12 deficiency History of gallstones    COPD, mild (HCC)    Basal cell carcinoma of back    Arthritis of left shoulder region    Gastroesophageal reflux disease without esophagitis    Celiac disease    Duodenitis    Type 2 diabetes mellitus without complication, without long-term current use of insulin (HCC)    TIA (transient ischemic attack)    Dysarthria    Cellulitis    Pressure ulcer of coccygeal region, stage 4 (HCC)    Wound infection           Plan       Wound vac functioning properly without leaks  Will plan for next dressing change tomorrow as long as impending discharge not expected.   GERBER updated

## 2023-02-16 NOTE — CARE COORDINATION
Social work: Updates faxed Jeremy Moe and Leon, MENA PRESTIGE, [employer]. Await acceptance.

## 2023-02-16 NOTE — PROGRESS NOTES
Comprehensive Nutrition Assessment    Type and Reason for Visit:  Reassess    Nutrition Recommendations/Plan:   Continue current diet and supplements. Monitor po intakes, miriam, and labs. Malnutrition Assessment:  Malnutrition Status:  Insufficient data (02/13/23 1815)      Nutrition Assessment:    Patient with increased confusion, eating poorly, ate 26-50 breakfast, not hungry for lunch. Will continue current diet and supplements and monitor po intakes, weight, and labs. Possible discharge to SNF,  working on placement. Nutrition Related Findings:    Edema: trace BLE. Active bowel sounds. Coccxy wound debridement (2/13) Wound Type: Surgical Incision, Open Wounds       Current Nutrition Intake & Therapies:    Average Meal Intake: 0%, 26-50%  Average Supplements Intake: 0%, 26-50%  ADULT ORAL NUTRITION SUPPLEMENT; Breakfast, Lunch, Dinner; Standard High Calorie/High Protein Oral Supplement  ADULT DIET; Regular; Gluten Free    Anthropometric Measures:  Height: 5' 6\" (167.6 cm)  Ideal Body Weight (IBW): 130 lbs (59 kg)    Admission Body Weight: 128 lb (58.1 kg)  Current Body Weight: 136 lb (61.7 kg) (Not sure weights are correct.), 90 % IBW.  Weight Source: Bed Scale  Current BMI (kg/m2): 22  Usual Body Weight: 128 lb (58.1 kg)  % Weight Change (Calculated): -8.6                    BMI Categories: Underweight (BMI less than 22) age over 72    Estimated Daily Nutrient Needs:  Energy Requirements Based On: Kcal/kg  Weight Used for Energy Requirements: Current  Energy (kcal/day): 0937-4233 kcal (30-33 kcal/kg)  Weight Used for Protein Requirements: Current  Protein (g/day): 74-85 gm of protein (1.4-1.6 gm/kg)       Nutrition Diagnosis:   Increased nutrient needs related to increase demand for energy/nutrients as evidenced by wounds (coccyx back wound)    Nutrition Interventions:   Food and/or Nutrient Delivery: Continue Current Diet, Continue Oral Nutrition Supplement  Nutrition Education/Counseling: Education not indicated  Coordination of Nutrition Care: Continue to monitor while inpatient       Goals:     Goals: PO intake 50% or greater       Nutrition Monitoring and Evaluation:      Food/Nutrient Intake Outcomes: Food and Nutrient Intake, Supplement Intake  Physical Signs/Symptoms Outcomes: Biochemical Data, Fluid Status or Edema, Skin, Weight    Discharge Planning:    Continue current diet, Continue Oral Nutrition Supplement       Some areas of assessment may be incomplete due to COVID-19 precautions    Last García RD, LD  Office phone [phone]

## 2023-02-16 NOTE — PROGRESS NOTES
[facility]  Progress Note    Carson Augustin  MRN: [id number]  Date: 2/16/2023  LOS: 7     Reason for F/U :   Infected sacrococcygeal ulceration    Impression :   Recent history of COVID-19 virus infection  Failure to thrive  Stage IV coccygeal/left gluteal pressure related ulceration with necrosis  Status post sacral wound debridement and application of a wound VAC  Total dimensions 8 x 4 cm with a depth of 2 cm  Dementia  Essential hypertension    Recommendations:   Continue intravenous antimicrobial therapy with ceftriaxone and oral metronidazole   The patient was previously on cefepime and vancomycin which were discontinued 2/15/2023. Continue wound care and the plan will be for discharge on IV antibiotic therapy given this is a stage IV ulceration with the periosteum pretty much nearly exposed  Plans for placement at a skilled nursing facility    Infection Control Recommendations:   Universal precautions    Discharge Planning:   Estimated Length of IV antimicrobials: 4 weeks  Patient will need Midline Catheter Insertion/ PICC line Insertion: No  Patient will need: SNF  Patient willneed outpatient wound care: Yes    Medical Decision making / Summary of Stay:   Carson Augustin is a 81y.o.-year-old female who was initially admitted on 2/9/2023. Bel Richards has history of dementia, hard of hearing, hypertension, hyperlipidemia, anemia, and GERD. She resides at [address] and it is my understanding that in December 2022 she had COVID-19 virus infection and since that time has not really recovered in terms of her ADLs and being herself. She has been more sedentary and it is my understanding that a few weeks ago she was found to have a skin tear by her gluteal area. This has subsequently progressed and when the patient was seen by the wound care nurse there was concern for infection and the patient was sent into the emergency department for further evaluation.        The patient has been admitted for an infected gluteal ulceration and I was asked to evaluate and help with antibiotic choice. The patient at this point in time is nonverbal, not really able to give me any history and the history it obtained from reviewing the chart and discussing the care with the nurse. She was taken to the operating room 2023 and had debridement of devitalized tissue but the base did have some devitalized muscle overlying the sacrum and that the risk of exposing the periosteum was left along and superficially cauterized. A wound VAC was placed    Current evaluation:2023    BP (!) 151/72   Pulse 96   Temp 97.7 °F (36.5 °C) (Oral)   Resp 16   Ht 5' 6\" (1.676 m)   Wt 136 lb (61.7 kg)   SpO2 97%   BMI 21.95 kg/m²     Temperature Range: Temp: 97.7 °F (36.5 °C) Temp  Av.2 °F (36.8 °C)  Min: 97.7 °F (36.5 °C)  Max: 98.6 °F (37 °C)  The patient is seen and evaluated at bedside she is awake and alert, frail and at this point in time is not really answering questions well. She did recently receive some pain medication. The care was discussed with the nurse at bedside. Review of Systems   Unable to perform ROS: Dementia     Physical Examination :     Physical Exam  Constitutional:       Appearance: She is well-developed. She is cachectic. HENT:      Head: Normocephalic and atraumatic. Mouth/Throat:      Mouth: Mucous membranes are dry. Cardiovascular:      Rate and Rhythm: Regular rhythm. Heart sounds: Murmur heard. Pulmonary:      Effort: Pulmonary effort is normal.      Breath sounds: Normal breath sounds. Abdominal:      General: Bowel sounds are normal.      Palpations: Abdomen is soft. Musculoskeletal:      Cervical back: Neck supple. Skin:     General: Skin is warm and dry. Comments: The gluteal wound VAC is in place and was not removed   Neurological:      Mental Status: She is alert.    Picture of the gluteal surgical wound attached below      Laboratory data:   I have independently reviewed the followinglabs:  CBC with Differential:   Recent Labs     02/15/23  0539 02/16/23  0616   WBC 7.2 7.3   HGB 8.7* 8.6*   HCT 28.1* 28.2*    361   LYMPHOPCT 20* 18*   MONOPCT 13* 13*       BMP:   Recent Labs     02/15/23  0539 02/16/23  0616    143   K 3.8 3.8   * 112*   CO2 23 23   BUN 14 14   CREATININE 0.53 0.46*       Hepatic Function Panel:   No results for input(s): PROT, LABALBU, BILIDIR, IBILI, BILITOT, ALKPHOS, ALT, AST in the last 72 hours. No results found for: PROCAL  Lab Results   Component Value Date/Time    CRP 73.1 02/09/2023 06:25 PM     Lab Results   Component Value Date    SEDRATE 82 (H) 02/09/2023     No results found for: DDIMER  No results found for: FERRITIN  No results found for: LDH  No results found for: FIBRINOGEN    No results found for requested labs within last 30 days. No results found for: COVID19    No results for input(s): VANCOTROUGH in the last 72 hours. Imaging Studies:   No new imaging    Cultures:     Culture, Tissue [[id number]] (Abnormal)  Collected: 02/13/23 1429   Order Status: Completed Specimen: Tissue from Sacrum Updated: 02/15/23 1107    Specimen Description . BACK . WOUND    Direct Exam MODERATE NEUTROPHILS Abnormal      MIXED BACTERIAL MORPHOTYPES SEEN ON GRAM STAIN.  Abnormal     Culture PROTEUS MIRABILIS HEAVY GROWTH Abnormal      MIXED ANAEROBIC WIL     NORMAL SKIN WIL     Susceptibility    Proteus mirabilis (1)    Antibiotic Interpretation Microscan Method Status    ampicillin Sensitive <=2 BACTERIAL SUSCEPTIBILITY PANEL JANET Preliminary    ceFAZolin Sensitive 8 BACTERIAL SUSCEPTIBILITY PANEL JANET Preliminary    cefTRIAXone Sensitive <=0.25 BACTERIAL SUSCEPTIBILITY PANEL JANET Preliminary    gentamicin Sensitive <=1 BACTERIAL SUSCEPTIBILITY PANEL JANET Preliminary    levofloxacin Sensitive <=0.12 BACTERIAL SUSCEPTIBILITY PANEL JANET Preliminary    piperacillin-tazobactam Sensitive <=4 BACTERIAL SUSCEPTIBILITY PANEL JANET Preliminary    tobramycin Sensitive <=1 BACTERIAL SUSCEPTIBILITY PANEL JANET Preliminary    trimethoprim-sulfamethoxazole Sensitive <=20 BACTERIAL SUSCEPTIBILITY PANEL JANET Preliminary          Specimen Collected: 02/13/23 14:29 EST Last Resulted: 02/15/23 11:06 EST        Culture, Anaerobic and Aerobic [[id number]] (Abnormal) Collected: 02/09/23 1809   Order Status: Completed Specimen: Coccyx Updated: 02/15/23 0740    Specimen Description . COCCYX    Direct Exam FEW NEUTROPHILS Abnormal      MIXED BACTERIAL MORPHOTYPES SEEN ON GRAM STAIN. Abnormal     Culture MULTIPLE SPECIES OF AEROBIC AND ANAEROBIC ENTERIC WIL   Blood Culture 1 [[id number]] Collected: 02/09/23 1805   Order Status: Completed Specimen: Blood Updated: 02/14/23 2143    Specimen Description . BLOOD    Special Requests RAR 12ML    Culture NO GROWTH 5 DAYS   Culture, Blood 2 [[id number]] Collected: 02/09/23 1820   Order Status: Completed Specimen: Blood Updated: 02/14/23 2143    Specimen Description . BLOOD    Special Requests RFA 12ML    Culture NO GROWTH 5 DAYS     Medications:      cefTRIAXone (ROCEPHIN) IV  2,000 mg IntraVENous Q24H    miconazole   Topical BID    metroNIDAZOLE  500 mg Oral 3 times per day    sodium hypochlorite   Irrigation BID    lisinopril  20 mg Oral Daily    levothyroxine  112 mcg Oral QAM AC    sodium chloride flush  5-40 mL IntraVENous 2 times per day    enoxaparin  40 mg SubCUTAneous Daily    famotidine  20 mg Oral Daily       Electronically signed by Shreya Brooks MD on 2/16/2023 at 3:00 PM      [facility]  Shreya Brooks MD  [employer]  OFFICE: [phone]    Thank you for allowing us to participate in the care of this patient. Please call with questions.     This note is created with the assistance of a speech recognition program.  While intending to generate a document that actually reflects the content of the visit, the document can still have some errors including those of syntax and sound a like substitutions which may escape proof reading. In such instances, actual meaning can be extrapolated by contextual diversion.

## 2023-02-16 NOTE — PROGRESS NOTES
Progress note  [facility]    Adult Hospitalist      Name: Mariella Santana  MRN: [id number]     Acct: [de-identified]  Room: 2002/2002-02    Admit Date: 2/9/2023  5:54 PM  PCP: Carson Gonzalez    Primary Problem  Principal Problem:    Cellulitis  Active Problems:    Pressure ulcer of coccygeal region, stage 4 ([address]    Wound infection  Resolved Problems:    * No resolved hospital problems. *        Assesment:     Decubitus coccygeal ulcer -unstageable, 8x4 cm, 2 cm deep  Mirabilis infection  Leukocytosis  Hypertension   Hyperlipidemia   History of TIA  Advanced dementia  Recent h/o Covid 19 infection   Failure to thrive, adult         Plan:      Admitted to med surge telemetry  O2 maintain oxygen saturation greater than 92%     Lactate and procalcitonin  Blood culture  Wound culture   IV vancomycin and cefepime , metronidazole-per ID discontinue vancomycin, switch metronidazole to p.o., DC cefepime and start ceftriaxone  ID consult   General surgery consult  Dakin's soln  DQ wound debridement 2/13    DC planning SNF    Continue to monitor/telemetry/CBC with differential daily/BMP daily  DVT and GI prophylaxis.   Continue medications as below      Scheduled Meds:   cefTRIAXone (ROCEPHIN) IV  2,000 mg IntraVENous Q24H    miconazole   Topical BID    metroNIDAZOLE  500 mg Oral 3 times per day    sodium hypochlorite   Irrigation BID    lisinopril  20 mg Oral Daily    levothyroxine  112 mcg Oral QAM AC    sodium chloride flush  5-40 mL IntraVENous 2 times per day    enoxaparin  40 mg SubCUTAneous Daily    famotidine  20 mg Oral Daily     Continuous Infusions:   sodium chloride 75 mL/hr at 02/15/23 0805    sodium chloride Stopped (02/15/23 0934)     PRN Meds:  metoprolol, 5 mg, Q6H PRN  oxyCODONE, 2.5 mg, Q8H PRN  sodium chloride flush, 10 mL, PRN  sodium chloride, , PRN  potassium chloride, 40 mEq, PRN   Or  potassium alternative oral replacement, 40 mEq, PRN   Or  potassium chloride, 10 mEq, PRN  magnesium sulfate, 1,000 mg, PRN  ondansetron, 4 mg, Q8H PRN   Or  ondansetron, 4 mg, Q6H PRN  magnesium hydroxide, 30 mL, Daily PRN  acetaminophen, 650 mg, Q6H PRN   Or  acetaminophen, 650 mg, Q6H PRN      Chief Complaint:     Chief Complaint   Patient presents with    Wound Infection     Open sore to coccyx          History of Present Illness:   Patient seen examined at bedside. Patient did not talk much. No acute events reported. Afebrile    HPI:      Carson Augustin is a 81 y.o.  female who presents with Wound Infection (Open sore to coccyx )    80-year-old lady presented to ER complaining of wound infection on her coccyx. She presented from extended care facility. She denies any fever, chills, cough, cold, changes urination, bowel habit or rash. Past medical history significant for dementia, hearing impairment, hypertension, hyperlipidemia, GERD. Patient is complaining of some pain at her coccygeal area. Sodium was 134. WBC was 13. I have personally reviewed the past medical history, past surgical history, medications, social history, and family history, and summarized in the note. Review of Systems:     All 10 point system is reviewed and negative otherwise mentioned in HPI. Past Medical History:     Past Medical History:   Diagnosis Date    Anemia     Gastroesophageal reflux disease without esophagitis 12/20/2017    Hard of hearing     Hyperlipidemia     Hypertension     Hypothyroidism     TIA (transient ischemic attack)         Past Surgical History:     Past Surgical History:   Procedure Laterality Date    BACK SURGERY N/A 2/13/2023    BACK WOUND DEBRIDEMENT performed by Leah Chua DO at [address] [place] [address] Right     TONSILLECTOMY      age 16        Medications Prior to Admission:       Prior to Admission medications    Medication Sig Start Date End Date Taking?  Authorizing Provider   levothyroxine (SYNTHROID) 112 MCG tablet Take 112 mcg by mouth every morning (before breakfast)   Yes Historical Provider, MD   sodium chloride (OCEAN, BABY AYR) 0.65 % nasal spray 1 spray by Nasal route as needed for Congestion   Yes Historical Provider, MD   magnesium cl-calcium carbonate (SLOW-MAG) 71.5-119 MG TBEC tablet Take 1 tablet by mouth daily   Yes Historical Provider, MD   oxyCODONE (ROXICODONE) 5 MG immediate release tablet Take 2.5 mg by mouth every 8 hours as needed for Pain. Yes Historical Provider, MD   collagenase 250 UNIT/GM ointment Apply topically daily TO COCCYX WOUND. Yes Historical Provider, MD   acetaminophen (TYLENOL) 500 MG tablet Take 500 mg by mouth every 6 hours as needed for Pain or Fever   Yes Historical Provider, MD   fosinopril (MONOPRIL) 20 MG tablet TAKE 1 TABLET BY MOUTH EVERY DAY  Patient taking differently: Take 20 mg by mouth daily TAKE 1 TABLET BY MOUTH EVERY DAY 11/10/19   MUMTAZ Gonzalez - CNP   Multiple Vitamins-Minerals (CENTRUM SILVER PO) Take 1 tablet by mouth daily (before dinner)    Historical Provider, MD        Allergies:       Gluten meal and Penicillins    Social History:     Tobacco:    reports that she quit smoking about 52 years ago. She has never used smokeless tobacco.  Alcohol:      reports no history of alcohol use. Drug Use:  reports no history of drug use.     Family History:     Family History   Problem Relation Age of Onset    Cancer Mother         breast    Other Father         ulcers         Physical Exam:     Vitals:  BP (!) 159/58   Pulse (!) 109   Temp 98.2 °F (36.8 °C)   Resp 18   Ht 5' 6\" (1.676 m)   Wt 131 lb (59.4 kg)   SpO2 94%   BMI 21.14 kg/m²   Temp (24hrs), Av.3 °F (36.8 °C), Min:97.9 °F (36.6 °C), Max:98.8 °F (37.1 °C)          General appearance - non verbal and in no acute distress  Mental status - not oriented to person, place, and time with normal affect  Head - normocephalic and atraumatic  Eyes - extraocular eye movements intact, conjunctiva clear  Ears - ext ears normal  Nose - no drainage noted  Mouth - mucous membranes moist  Neck - supple, no carotid bruits, thyroid not palpable  Chest - clear to auscultation, normal effort  Heart - normal rate, regular rhythm, no murmur  Abdomen - soft, nontender, nondistended, bowel sounds present all four quadrants, no masses, hepatomegaly or splenomegaly  Neurological - difficult to assess  Extremities - peripheral pulses palpable, no pedal edema or calf pain with palpation  Skin - no gross lesions, rashes, or induration noted        Data:     Labs:    Hematology:  Recent Labs     02/13/23  0601 02/14/23  0714 02/15/23  0539   WBC 8.0 7.4 7.2   RBC 3.14* 2.95* 2.94*   HGB 9.3* 8.7* 8.7*   HCT 29.6* 28.0* 28.1*   MCV 94.3 94.9 95.6   MCH 29.6 29.5 29.6   MCHC 31.4 31.1 31.0   RDW 12.9 13.0 13.1    313 313   MPV 9.4 9.4 9.2       Chemistry:  Recent Labs     02/13/23  0601 02/14/23  0714 02/15/23  0539    138 142   K 4.3 3.9 3.8   * 109* 112*   CO2 23 22 23   GLUCOSE 147* 120* 128*   BUN 11 11 14   CREATININE 0.41* 0.67 0.53   MG 1.8  --   --    ANIONGAP 6* 7* 7*   LABGLOM >60 >60 >60   CALCIUM 9.0 8.9 8.7       Recent Labs     02/13/23  1305 02/15/23  1354   POCGLU 106* 128*         Lab Results   Component Value Date    INR 1.2 02/10/2023    INR 1.0 10/06/2022    PROTIME 15.0 (H) 02/10/2023    PROTIME 10.2 10/06/2022       Lab Results   Component Value Date/Time    SPECIAL RFA 12ML 02/09/2023 06:20 PM     Lab Results   Component Value Date/Time    CULTURE PROTEUS MIRABILIS HEAVY GROWTH (A) 02/13/2023 02:29 PM    CULTURE MIXED ANAEROBIC WIL 02/13/2023 02:29 PM    CULTURE NORMAL SKIN WIL 02/13/2023 02:29 PM       No results found for: POCPH, PHART, PH, POCPCO2, RWO0EWD, PCO2, POCPO2, PO2ART, PO2, POCHCO3, RGU7UUI, HCO3, NBEA, PBEA, BEART, BE, THGBART, THB, TKG7UHK, TQHP3INT, N4FUXHYZ, O2SAT, FIO2    Radiology:    No results found.       All radiological studies reviewed                Code Status:  Full Code    Electronically signed by Alexandro Govea MD on 2/15/2023 at 9:48 PM     Copy sent to Dr. Ruddy Madden    This note was created with the assistance of a speech-recognition program.  Although the intention is to generate a document that actually reflects the content of the visit, no guarantees can be provided that every mistake has been identified and corrected by editing. Note was updated later by me after  physical examination and  completion of the assessment.

## 2023-02-16 NOTE — CARE COORDINATION
Social work: Spoke to Chucho Sandoval [facility]), they are able to accept at discharge. They will need to order wound vac. Patient needs PICC line for IV antibiotics. PRISCILA called Petra/SHANNON and she will reach out to rAely Metcalf to work out [employer]. SHANNON will transport at discharge.    CALL PETRA TO Janessa Valenzuela- [phone]

## 2023-02-16 NOTE — PROGRESS NOTES
Progress note  [facility]    Adult Hospitalist      Name: Magdy Magaña  MRN: [id number]     Acct: [de-identified]  Room: 2002/2002-02    Admit Date: 2/9/2023  5:54 PM  PCP: Ruddy Madden    Primary Problem  Principal Problem:    Cellulitis  Active Problems:    Pressure ulcer of coccygeal region, stage 4 ([address]    Wound infection  Resolved Problems:    * No resolved hospital problems. *        Assesment:     Decubitus coccygeal ulcer -unstageable, 8x4 cm, 2 cm deep  Mirabilis infection  Leukocytosis  Hypertension   Hyperlipidemia   History of TIA  Advanced dementia  Recent h/o Covid 19 infection   Failure to thrive, adult         Plan:      Admitted to med surge telemetry  O2 maintain oxygen saturation greater than 92%     Lactate and procalcitonin  Blood culture  Wound culture   IV vancomycin and cefepime , metronidazole-per ID discontinue vancomycin, switch metronidazole to p.o., DC cefepime and start ceftriaxone  ID consult   General surgery consult  Dakin's soln  DQ wound debridement 2/13    DC planning SNF    Continue to monitor/telemetry/CBC with differential daily/BMP daily  DVT and GI prophylaxis.   Continue medications as below      Scheduled Meds:   metroNIDAZOLE  500 mg Oral 3 times per day    cefTRIAXone (ROCEPHIN) IV  2,000 mg IntraVENous Q24H    miconazole   Topical BID    sodium hypochlorite   Irrigation BID    lisinopril  20 mg Oral Daily    levothyroxine  112 mcg Oral QAM AC    sodium chloride flush  5-40 mL IntraVENous 2 times per day    enoxaparin  40 mg SubCUTAneous Daily    famotidine  20 mg Oral Daily     Continuous Infusions:   sodium chloride 75 mL/hr at 02/16/23 1748    sodium chloride Stopped (02/15/23 0934)     PRN Meds:  metoprolol, 5 mg, Q6H PRN  oxyCODONE, 2.5 mg, Q8H PRN  sodium chloride flush, 10 mL, PRN  sodium chloride, , PRN  potassium chloride, 40 mEq, PRN   Or  potassium alternative oral replacement, 40 mEq, PRN   Or  potassium chloride, 10 mEq, PRN  magnesium sulfate, 1,000 mg, PRN  ondansetron, 4 mg, Q8H PRN   Or  ondansetron, 4 mg, Q6H PRN  magnesium hydroxide, 30 mL, Daily PRN  acetaminophen, 650 mg, Q6H PRN   Or  acetaminophen, 650 mg, Q6H PRN      Chief Complaint:     Chief Complaint   Patient presents with    Wound Infection     Open sore to coccyx          History of Present Illness:   Patient seen examined at bedside. Patient wants to some questions appropriately  Await wound VAC  No acute events reported. Afebrile    HPI:      Amador Hill is a 81 y.o.  female who presents with Wound Infection (Open sore to coccyx )    81-year-old lady presented to ER complaining of wound infection on her coccyx. She presented from extended care facility. She denies any fever, chills, cough, cold, changes urination, bowel habit or rash. Past medical history significant for dementia, hearing impairment, hypertension, hyperlipidemia, GERD. Patient is complaining of some pain at her coccygeal area. Sodium was 134. WBC was 13. I have personally reviewed the past medical history, past surgical history, medications, social history, and family history, and summarized in the note. Review of Systems:     All 10 point system is reviewed and negative otherwise mentioned in HPI. Past Medical History:     Past Medical History:   Diagnosis Date    Anemia     Gastroesophageal reflux disease without esophagitis 12/20/2017    Hard of hearing     Hyperlipidemia     Hypertension     Hypothyroidism     TIA (transient ischemic attack)         Past Surgical History:     Past Surgical History:   Procedure Laterality Date    BACK SURGERY N/A 2/13/2023    BACK WOUND DEBRIDEMENT performed by Jonathan Thakur DO at [address] [place] [address] Right     TONSILLECTOMY      age [address]        Medications Prior to Admission:       Prior to Admission medications    Medication Sig Start Date End Date Taking?  Authorizing Provider   levothyroxine (SYNTHROID) 112 MCG tablet Take 112 mcg by mouth every morning (before breakfast)   Yes Historical Provider, MD   sodium chloride (OCEAN, BABY AYR) 0.65 % nasal spray 1 spray by Nasal route as needed for Congestion   Yes Historical Provider, MD   magnesium cl-calcium carbonate (SLOW-MAG) 71.5-119 MG TBEC tablet Take 1 tablet by mouth daily   Yes Historical Provider, MD   oxyCODONE (ROXICODONE) 5 MG immediate release tablet Take 2.5 mg by mouth every 8 hours as needed for Pain. Yes Historical Provider, MD   collagenase 250 UNIT/GM ointment Apply topically daily TO COCCYX WOUND. Yes Historical Provider, MD   acetaminophen (TYLENOL) 500 MG tablet Take 500 mg by mouth every 6 hours as needed for Pain or Fever   Yes Historical Provider, MD   fosinopril (MONOPRIL) 20 MG tablet TAKE 1 TABLET BY MOUTH EVERY DAY  Patient taking differently: Take 20 mg by mouth daily TAKE 1 TABLET BY MOUTH EVERY DAY 11/10/19   Renny Curry APRN - CNP   Multiple Vitamins-Minerals (CENTRUM SILVER PO) Take 1 tablet by mouth daily (before dinner)    Historical Provider, MD        Allergies:       Gluten meal and Penicillins    Social History:     Tobacco:    reports that she quit smoking about 52 years ago. She has never used smokeless tobacco.  Alcohol:      reports no history of alcohol use. Drug Use:  reports no history of drug use.     Family History:     Family History   Problem Relation Age of Onset    Cancer Mother         breast    Other Father         ulcers         Physical Exam:     Vitals:  BP (!) 151/72   Pulse 96   Temp 97.7 °F (36.5 °C) (Oral)   Resp 16   Ht 5' 6\" (1.676 m)   Wt 136 lb (61.7 kg)   SpO2 97%   BMI 21.95 kg/m²   Temp (24hrs), Av.2 °F (36.8 °C), Min:97.7 °F (36.5 °C), Max:98.6 °F (37 °C)          General appearance - non verbal and in no acute distress  Mental status - not oriented to person, place, and time with normal affect  Head - normocephalic and atraumatic  Eyes - extraocular eye movements intact, conjunctiva clear  Ears - ext ears normal  Nose - no drainage noted  Mouth - mucous membranes moist  Neck - supple, no carotid bruits, thyroid not palpable  Chest - clear to auscultation, normal effort  Heart - normal rate, regular rhythm, no murmur  Abdomen - soft, nontender, nondistended, bowel sounds present all four quadrants, no masses, hepatomegaly or splenomegaly  Neurological - difficult to assess  Extremities - peripheral pulses palpable, no pedal edema or calf pain with palpation  Skin - no gross lesions, rashes, or induration noted        Data:     Labs:    Hematology:  Recent Labs     02/14/23  0714 02/15/23  0539 02/16/23  0616   WBC 7.4 7.2 7.3   RBC 2.95* 2.94* 2.94*   HGB 8.7* 8.7* 8.6*   HCT 28.0* 28.1* 28.2*   MCV 94.9 95.6 95.9   MCH 29.5 29.6 29.3   MCHC 31.1 31.0 30.5   RDW 13.0 13.1 13.3    313 361   MPV 9.4 9.2 9.3       Chemistry:  Recent Labs     02/14/23  0714 02/15/23  0539 02/16/23  0616    142 143   K 3.9 3.8 3.8   * 112* 112*   CO2 22 23 23   GLUCOSE 120* 128* 125*   BUN 11 14 14   CREATININE 0.67 0.53 0.46*   ANIONGAP 7* 7* 8*   LABGLOM >60 >60 >60   CALCIUM 8.9 8.7 8.7       Recent Labs     02/15/23  1354   POCGLU 128*         Lab Results   Component Value Date    INR 1.2 02/10/2023    INR 1.0 10/06/2022    PROTIME 15.0 (H) 02/10/2023    PROTIME 10.2 10/06/2022       Lab Results   Component Value Date/Time    SPECIAL RFA 12ML 02/09/2023 06:20 PM     Lab Results   Component Value Date/Time    CULTURE PROTEUS MIRABILIS HEAVY GROWTH (A) 02/13/2023 02:29 PM    CULTURE MIXED ANAEROBIC WIL 02/13/2023 02:29 PM    CULTURE NORMAL SKIN WIL 02/13/2023 02:29 PM       No results found for: POCPH, PHART, PH, POCPCO2, [id number], PCO2, POCPO2, PO2ART, PO2, POCHCO3, KWO0YVF, HCO3, NBEA, PBEA, BEART, BE, THGBART, THB, [id number], YELS6ACH, [id number], O2SAT, FIO2    Radiology:    No results found.       All radiological studies reviewed                Code Status:  Full Code    Electronically signed by Ashkan Padron MD on 2/16/2023 at 6:52 PM     Copy sent to Dr. Micheal Stewart    This note was created with the assistance of a speech-recognition program.  Although the intention is to generate a document that actually reflects the content of the visit, no guarantees can be provided that every mistake has been identified and corrected by editing. Note was updated later by me after  physical examination and  completion of the assessment.

## 2023-02-16 NOTE — CARE COORDINATION
Social work: Antonio Hernandez [place] for [facility] from [facility]. [facility] has ordered wound vac and will be delivered by tomorrow. TRANSPORTATION ARRANGED [facility]IA [facility] TO [facility] AT 3:00PM ON Friday.    # FOR REPORT -[phone]  FAX: [phone]  Central intake fax: [phone]: [phone]  PHONE NUMBER FOR SON[facility]PACE: [phone]

## 2023-02-16 NOTE — PLAN OF CARE
Problem: Discharge Planning  Goal: Discharge to home or other facility with appropriate resources  2/16/2023 0326 by Greta Garcia RN  Outcome: Progressing  Flowsheets (Taken 2/12/2023 0849 by Hung Vanegas RN)  Discharge to home or other facility with appropriate resources:   Identify barriers to discharge with patient and caregiver   Arrange for needed discharge resources and transportation as appropriate   Identify discharge learning needs (meds, wound care, etc)   Refer to discharge planning if patient needs post-hospital services based on physician order or complex needs related to functional status, cognitive ability or social support system  2/15/2023 1452 by Alex Peters, RN  Outcome: Progressing     Problem: Skin/Tissue Integrity  Goal: Absence of new skin breakdown  Description: 1. Monitor for areas of redness and/or skin breakdown  2. Assess vascular access sites hourly  3. Every 4-6 hours minimum:  Change oxygen saturation probe site  4. Every 4-6 hours:  If on nasal continuous positive airway pressure, respiratory therapy assess nares and determine need for appliance change or resting period.   2/16/2023 0326 by Greta Garcia RN  Outcome: Progressing  Note: Skin assessment completed and documented  Aquiles scale updated  Relieve pressure to bony prominences  Avoid shearing  Assess s/sx of infection   Turned q 2 hours  Miguelina care given and barrier cream applied to prevent breakdown  Heels elevated  Structural intactness and normal physiological function of skin and mucous membranes    2/15/2023 1452 by Alex Peters, RN  Outcome: Progressing     Problem: Safety - Adult  Goal: Free from fall injury  Outcome: Progressing  Note: Proper pt identification  Hourly rounding performed  Anticipatory needs met  Non-skid socks worn  Proper transferring technique  2/4 side rails up  Personal necessities within reach  Bed low and locked  Call light in reach  Proper lighting  Room free of clutter Problem: ABCDS Injury Assessment  Goal: Absence of physical injury  Outcome: Progressing  Flowsheets (Taken 2/12/2023 2022 by Ale Wilks RN)  Absence of Physical Injury: Implement safety measures based on patient assessment     Problem: Chronic Conditions and Co-morbidities  Goal: Patient's chronic conditions and co-morbidity symptoms are monitored and maintained or improved  2/16/2023 0326 by Neo Corcoran RN  Outcome: Naima Garber (Taken 2/12/2023 0849 by Ale Wilks RN)  Care Plan - Patient's Chronic Conditions and Co-Morbidity Symptoms are Monitored and Maintained or Improved:   Monitor and assess patient's chronic conditions and comorbid symptoms for stability, deterioration, or improvement   Collaborate with multidisciplinary team to address chronic and comorbid conditions and prevent exacerbation or deterioration   Update acute care plan with appropriate goals if chronic or comorbid symptoms are exacerbated and prevent overall improvement and discharge  2/15/2023 1452 by Ana Rosa Estrada RN  Outcome: Progressing     Problem: Neurosensory - Adult  Goal: Achieves stable or improved neurological status  Outcome: Progressing  Flowsheets (Taken 2/12/2023 0849 by Ale Wilks RN)  Achieves stable or improved neurological status:   Assess for and report changes in neurological status   Maintain blood pressure and fluid volume within ordered parameters to optimize cerebral perfusion and minimize risk of hemorrhage     Problem: Cardiovascular - Adult  Goal: Maintains optimal cardiac output and hemodynamic stability  Outcome: Progressing  Flowsheets (Taken 2/12/2023 0849 by Ale Wilks RN)  Maintains optimal cardiac output and hemodynamic stability:   Monitor blood pressure and heart rate   Monitor urine output and notify Licensed Independent Practitioner for values outside of normal range   Assess for signs of decreased cardiac output   Administer fluid and/or volume expanders as ordered  Goal: Absence of cardiac dysrhythmias or at baseline  Outcome: Progressing  Flowsheets (Taken 2/12/2023 0849 by Mikayla Miller RN)  Absence of cardiac dysrhythmias or at baseline:   Monitor cardiac rate and rhythm   Assess for signs of decreased cardiac output     Problem: Skin/Tissue Integrity - Adult  Goal: Skin integrity remains intact  Outcome: Progressing  Flowsheets (Taken 2/12/2023 2015 by Audrey Callahan RN)  Skin Integrity Remains Intact: Monitor for areas of redness and/or skin breakdown  Goal: Incisions, wounds, or drain sites healing without S/S of infection  Outcome: Progressing  Flowsheets (Taken 2/12/2023 2015 by Audrey Callahan RN)  Incisions, Wounds, or Drain Sites Healing Without Sign and Symptoms of Infection: Implement wound care per orders  Goal: Oral mucous membranes remain intact  Outcome: Progressing  Flowsheets (Taken 2/12/2023 0849 by Mikayla Miller RN)  Oral Mucous Membranes Remain Intact:   Assess oral mucosa and hygiene practices   Implement preventative oral hygiene regimen     Problem: Musculoskeletal - Adult  Goal: Return mobility to safest level of function  Outcome: Progressing  Flowsheets (Taken 2/12/2023 0849 by Mikayla Miller RN)  Return Mobility to Safest Level of Function:   Assess patient stability and activity tolerance for standing, transferring and ambulating with or without assistive devices   Assist with transfers and ambulation using safe patient handling equipment as needed   Ensure adequate protection for wounds/incisions during mobilization   Obtain physical therapy/occupational therapy consults as needed   Instruct patient/family in ordered activity level  Goal: Return ADL status to a safe level of function  Outcome: Progressing  Flowsheets (Taken 2/12/2023 0849 by Mikayla Miller RN)  Return ADL Status to a Safe Level of Function:   Administer medication as ordered   Assess activities of daily living deficits and provide assistive devices as needed   Obtain physical therapy/occupational therapy consults as needed   Assist and instruct patient to increase activity and self care as tolerated     Problem: Genitourinary - Adult  Goal: Absence of urinary retention  Outcome: Progressing  Flowsheets (Taken 2/12/2023 0849 by King Antonella RN)  Absence of urinary retention:   Assess patients ability to void and empty bladder   Monitor intake/output and perform bladder scan as needed  Goal: Urinary catheter remains patent  Outcome: Progressing  Flowsheets (Taken 2/12/2023 2022 by King Antonella RN)  Urinary catheter remains patent: Assess patency of urinary catheter     Problem: Pain  Goal: Verbalizes/displays adequate comfort level or baseline comfort level  Outcome: Progressing  Note: Pain level assessment complete.    Patient educated on pain scale and control interventions  PRN pain medication given per patient request  Patient instructed to call out with new onset of pain or unrelieved pain       Problem: Nutrition Deficit:  Goal: Optimize nutritional status  Outcome: Progressing  Flowsheets (Taken 2/15/2023 0100)  Nutrient intake appropriate for improving, restoring, or maintaining nutritional needs:   Assess nutritional status and recommend course of action   Monitor oral intake, labs, and treatment plans

## 2023-02-17 VITALS
TEMPERATURE: 97.9 F | RESPIRATION RATE: 18 BRPM | OXYGEN SATURATION: 99 % | DIASTOLIC BLOOD PRESSURE: 63 MMHG | BODY MASS INDEX: 21.89 KG/M2 | SYSTOLIC BLOOD PRESSURE: 161 MMHG | HEART RATE: 101 BPM | HEIGHT: 66 IN | WEIGHT: 136.2 LBS

## 2023-02-17 LAB
ABSOLUTE EOS #: 0.24 K/UL (ref 0–0.44)
ABSOLUTE IMMATURE GRANULOCYTE: 0.07 K/UL (ref 0–0.3)
ABSOLUTE LYMPH #: 1.1 K/UL (ref 1.1–3.7)
ABSOLUTE MONO #: 0.83 K/UL (ref 0.1–1.2)
ANION GAP SERPL CALCULATED.3IONS-SCNC: 9 MMOL/L (ref 9–17)
BASOPHILS # BLD: 1 % (ref 0–2)
BASOPHILS ABSOLUTE: 0.04 K/UL (ref 0–0.2)
BUN SERPL-MCNC: 14 MG/DL (ref 8–23)
BUN/CREAT BLD: 26 (ref 9–20)
CALCIUM SERPL-MCNC: 9 MG/DL (ref 8.6–10.4)
CHLORIDE SERPL-SCNC: 110 MMOL/L (ref 98–107)
CO2 SERPL-SCNC: 23 MMOL/L (ref 20–31)
CREAT SERPL-MCNC: 0.54 MG/DL (ref 0.5–0.9)
EOSINOPHILS RELATIVE PERCENT: 4 % (ref 1–4)
GFR SERPL CREATININE-BSD FRML MDRD: >60 ML/MIN/1.73M2
GLUCOSE SERPL-MCNC: 130 MG/DL (ref 70–99)
HCT VFR BLD AUTO: 28.7 % (ref 36.3–47.1)
HGB BLD-MCNC: 8.8 G/DL (ref 11.9–15.1)
IMMATURE GRANULOCYTES: 1 %
LYMPHOCYTES # BLD: 16 % (ref 24–43)
MCH RBC QN AUTO: 29.2 PG (ref 25.2–33.5)
MCHC RBC AUTO-ENTMCNC: 30.7 G/DL (ref 28.4–34.8)
MCV RBC AUTO: 95.3 FL (ref 82.6–102.9)
MICROORGANISM SPEC CULT: ABNORMAL
MICROORGANISM/AGENT SPEC: ABNORMAL
MICROORGANISM/AGENT SPEC: ABNORMAL
MONOCYTES # BLD: 12 % (ref 3–12)
NRBC AUTOMATED: 0 PER 100 WBC
PDW BLD-RTO: 13.5 % (ref 11.8–14.4)
PLATELET # BLD AUTO: 361 K/UL (ref 138–453)
PMV BLD AUTO: 9.2 FL (ref 8.1–13.5)
POTASSIUM SERPL-SCNC: 4 MMOL/L (ref 3.7–5.3)
RBC # BLD: 3.01 M/UL (ref 3.95–5.11)
SEG NEUTROPHILS: 66 % (ref 36–65)
SEGMENTED NEUTROPHILS ABSOLUTE COUNT: 4.59 K/UL (ref 1.5–8.1)
SODIUM SERPL-SCNC: 142 MMOL/L (ref 135–144)
SPECIMEN DESCRIPTION: ABNORMAL
WBC # BLD AUTO: 6.9 K/UL (ref 3.5–11.3)

## 2023-02-17 PROCEDURE — 99232 SBSQ HOSP IP/OBS MODERATE 35: CPT | Performed by: INTERNAL MEDICINE

## 2023-02-17 PROCEDURE — 80048 BASIC METABOLIC PNL TOTAL CA: CPT

## 2023-02-17 PROCEDURE — 99222 1ST HOSP IP/OBS MODERATE 55: CPT

## 2023-02-17 PROCEDURE — 6370000000 HC RX 637 (ALT 250 FOR IP): Performed by: SURGERY

## 2023-02-17 PROCEDURE — 6370000000 HC RX 637 (ALT 250 FOR IP): Performed by: INTERNAL MEDICINE

## 2023-02-17 PROCEDURE — 85025 COMPLETE CBC W/AUTO DIFF WBC: CPT

## 2023-02-17 PROCEDURE — 36415 COLL VENOUS BLD VENIPUNCTURE: CPT

## 2023-02-17 PROCEDURE — 6360000002 HC RX W HCPCS: Performed by: SURGERY

## 2023-02-17 PROCEDURE — 2580000003 HC RX 258: Performed by: SURGERY

## 2023-02-17 RX ADMIN — FAMOTIDINE 20 MG: 20 TABLET, FILM COATED ORAL at 08:56

## 2023-02-17 RX ADMIN — LISINOPRIL 20 MG: 20 TABLET ORAL at 08:57

## 2023-02-17 RX ADMIN — ANTI-FUNGAL POWDER MICONAZOLE NITRATE TALC FREE: 1.42 POWDER TOPICAL at 08:59

## 2023-02-17 RX ADMIN — SODIUM CHLORIDE, PRESERVATIVE FREE 10 ML: 5 INJECTION INTRAVENOUS at 08:57

## 2023-02-17 RX ADMIN — LEVOTHYROXINE SODIUM 112 MCG: 112 TABLET ORAL at 05:38

## 2023-02-17 RX ADMIN — METRONIDAZOLE 500 MG: 500 TABLET ORAL at 05:38

## 2023-02-17 RX ADMIN — ENOXAPARIN SODIUM 40 MG: 100 INJECTION SUBCUTANEOUS at 08:57

## 2023-02-17 NOTE — PROGRESS NOTES
Wound Ostomy Continence Nursing  Follow Up Visit      NAME:  Natalio OmarSOmar Gipson Ancram RECORD NUMBER:  [id number]  AGE: 81 y.o. GENDER: female  : 1935  TODAY'S DATE:  2023    Subjective        Follow up for mgmt of buttock unstageable wound    Review of Systems:  Unable to complete, patient confused and no family present during visit. Objective     Vitals:  BP (!) 161/63   Pulse (!) 101   Temp 97.9 °F (36.6 °C) (Oral)   Resp 18   Ht 5' 6\" (1.676 m)   Wt 136 lb 3.2 oz (61.8 kg)   SpO2 99%   BMI 21.98 kg/m²    TEMPERATURE:  Current - Temp: 97.9 °F (36.6 °C);  Max - Temp  Av.8 °F (36.6 °C)  Min: 97.7 °F (36.5 °C)  Max: 97.9 °F (36.6 °C)    Aquiles Risk Score Aquiles Scale Score: 12    INTAKE/OUTPUT:      Intake/Output Summary (Last 24 hours) at 2023 1332  Last data filed at 2023 0546  Gross per 24 hour   Intake 2062.87 ml   Output 2850 ml   Net -787.13 ml                 Physical Exam:  General Appearance: alert and oriented to person, place and time, well-developed and well-nourished, in no acute distress  Head: normocephalic and atraumatic  Pulmonary/Chest: normal air movement, no respiratory distress  Skin: Wound vac dressing intact and appears to be functioning properly  Cardiovascular/Circulation: minimal edema, no cyanosis, palpable peripheral pulses  Extremities: no cyanosis and no clubbing  Musculoskeletal: no joint deformity or tenderness, no extremity amputations  Neurologic: gait not evaluated this visit, coordination normal and speech normal      Data Review:  LABS:  CBC:   Recent Labs     02/15/23  0539 23  0616 23  0624   WBC 7.2 7.3 6.9   HGB 8.7* 8.6* 8.8*    361 361     BMP:    Lab Results   Component Value Date/Time     2023 06:24 AM    K 4.0 2023 06:24 AM     2023 06:24 AM    CO2 23 2023 06:24 AM    BUN 14 2023 06:24 AM    LABALBU 2.7 2023 08:49 AM    CREATININE 0.54 2023 06:24 AM    CALCIUM 9.0 02/17/2023 06:24 AM    GFRAA >60 08/14/2019 06:20 PM    LABGLOM >60 02/17/2023 06:24 AM    GLUCOSE 130 02/17/2023 06:24 AM     Coagulation: No results for input(s): APTT, PROT, INR in the last 72 hours. Assessment       Patient Active Problem List   Diagnosis    Mixed hyperlipidemia    Essential hypertension    Hypothyroidism    Vitamin D deficiency    Hallucinations, visual    Vitamin B12 deficiency    History of gallstones    COPD, mild (HCC)    Basal cell carcinoma of back    Arthritis of left shoulder region    Gastroesophageal reflux disease without esophagitis    Celiac disease    Duodenitis    Type 2 diabetes mellitus without complication, without long-term current use of insulin (HCC)    TIA (transient ischemic attack)    Dysarthria    Cellulitis    Pressure ulcer of coccygeal region, stage 4 ([address]    Wound infection             Plan       Plan for discharge today  Remove wound vac dressing and transfer with saline moistened gauze dressing.   GERBER ready

## 2023-02-17 NOTE — CARE COORDINATION
Social work: spoke to [facility] they can come early for this pt., checked with snf they said 2 pm  would be ok as their staffing is better at 3 pm today and it takes 1 hour and 15 minutes to 30 minutes to get there. Will call son to advise of time change. Faxed updates. Now faxing tamara. Gayle domingo    Social work: son notified of time change, and he will visit there Saturday morning. Per son.  Gayle domingo

## 2023-02-17 NOTE — PROGRESS NOTES
Attempted to call report at [phone] was transferred to the nurses station. Phone rang with no answer and eventually the line cut off. Attempted to recall and no one answered.

## 2023-02-17 NOTE — PROGRESS NOTES
Progress note  [facility]    Adult Hospitalist      Name: Carson Augustin  MRN: [id number]     Acct: [de-identified]  Room: 2002/2002-02    Admit Date: 2/9/2023  5:54 PM  PCP: Tam Nichole    Primary Problem  Principal Problem:    Cellulitis  Active Problems:    Pressure ulcer of coccygeal region, stage 4 ([address]    Wound infection  Resolved Problems:    * No resolved hospital problems. *        Assesment:     Decubitus coccygeal ulcer -unstageable, 8x4 cm, 2 cm deep  Mirabilis infection  Leukocytosis  Hypertension   Hyperlipidemia   History of TIA  Advanced dementia  Recent h/o Covid 19 infection   Failure to thrive, adult         Plan:      Admitted to [facility] surge telemetry  O2 maintain oxygen saturation greater than 92%     Lactate and procalcitonin  Blood culture  Wound culture   IV vancomycin and cefepime , metronidazole-per ID discontinue vancomycin, switch metronidazole to p.o., DC cefepime and start ceftriaxone  ID consult   General surgery consult  Dakin's soln  DQ wound debridement 2/13    DC planning SNF  Michelle   Rx  Spent more than 35 minutes  Continue to monitor/telemetry/CBC with differential daily/BMP daily  DVT and GI prophylaxis. Continue medications as below      Scheduled Meds:  REM    Continuous Infusions:  REM    PRN Meds:  REM      Chief Complaint:     Chief Complaint   Patient presents with    Wound Infection     Open sore to coccyx          History of Present Illness:   Patient seen examined at bedside. Patient wants to some questions appropriately  No acute events reported. Afebrile    HPI:      Carson Augustin is a 81 y.o.  female who presents with Wound Infection (Open sore to coccyx )    71-year-old lady presented to ER complaining of wound infection on her coccyx. She presented from extended care facility. She denies any fever, chills, cough, cold, changes urination, bowel habit or rash.   Past medical history significant for dementia, hearing impairment, hypertension, hyperlipidemia, GERD. Patient is complaining of some pain at her coccygeal area. Sodium was 134. WBC was 13. I have personally reviewed the past medical history, past surgical history, medications, social history, and family history, and summarized in the note. Review of Systems:     All 10 point system is reviewed and negative otherwise mentioned in HPI. Past Medical History:     Past Medical History:   Diagnosis Date    Anemia     Gastroesophageal reflux disease without esophagitis 12/20/2017    Hard of hearing     Hyperlipidemia     Hypertension     Hypothyroidism     TIA (transient ischemic attack)         Past Surgical History:     Past Surgical History:   Procedure Laterality Date    BACK SURGERY N/A 2/13/2023    BACK WOUND DEBRIDEMENT performed by Sam Garcia DO at [address]     TONSILLECTOMY      age 16        Medications Prior to Admission:       Prior to Admission medications    Medication Sig Start Date End Date Taking? Authorizing Provider   cefTRIAXone (ROCEPHIN) infusion Infuse 2,000 mg intravenously every 24 hours for 28 days Compound per protocol 2/17/23 3/17/23 Yes David Beckham MD   oxyCODONE (ROXICODONE) 5 MG immediate release tablet Take 0.5 tablets by mouth every 8 hours as needed for Pain for up to 3 days. Max Daily Amount: 7.5 mg 2/16/23 2/19/23 Yes Candelario Carreno MD   miconazole (MICOTIN) 2 % powder Apply topically 2 times daily.  2/16/23  Yes Candelario Carreno MD   famotidine (PEPCID) 20 MG tablet Take 1 tablet by mouth daily 2/17/23  Yes Candelario Carreno MD   metroNIDAZOLE (FLAGYL) 500 MG tablet Take 1 tablet by mouth every 8 hours for 2 doses 2/16/23 2/17/23 Yes Candelario Carreno MD   sodium hypochlorite (DAKINS) 0.125 % SOLN external solution Apply topically 2 times daily 2/16/23  Yes Candelario Carreno MD   levothyroxine (SYNTHROID) 112 MCG tablet Take 112 mcg by mouth every morning (before breakfast)   Yes Historical Provider, MD   sodium chloride (OCEAN, BABY AYR) 0.65 % nasal spray 1 spray by Nasal route as needed for Congestion   Yes Historical Provider, MD   magnesium cl-calcium carbonate (SLOW-MAG) 71.5-119 MG TBEC tablet Take 1 tablet by mouth daily   Yes Historical Provider, MD   collagenase 250 UNIT/GM ointment Apply topically daily TO COCCYX WOUND. Yes Historical Provider, MD   acetaminophen (TYLENOL) 500 MG tablet Take 500 mg by mouth every 6 hours as needed for Pain or Fever   Yes Historical Provider, MD   fosinopril (MONOPRIL) 20 MG tablet TAKE 1 TABLET BY MOUTH EVERY DAY  Patient taking differently: Take 20 mg by mouth daily TAKE 1 TABLET BY MOUTH EVERY DAY 11/10/19   Ruddy Woods APRN - CNP   Multiple Vitamins-Minerals (CENTRUM SILVER PO) Take 1 tablet by mouth daily (before dinner)    Historical Provider, MD        Allergies:       Gluten meal and Penicillins    Social History:     Tobacco:    reports that she quit smoking about 52 years ago. She has never used smokeless tobacco.  Alcohol:      reports no history of alcohol use. Drug Use:  reports no history of drug use.     Family History:     Family History   Problem Relation Age of Onset    Cancer Mother         breast    Other Father         ulcers         Physical Exam:     Vitals:  BP (!) 161/63   Pulse (!) 101   Temp 97.9 °F (36.6 °C) (Oral)   Resp 18   Ht 5' 6\" (1.676 m)   Wt 136 lb 3.2 oz (61.8 kg)   SpO2 99%   BMI 21.98 kg/m²   Temp (24hrs), Av.8 °F (36.6 °C), Min:97.7 °F (36.5 °C), Max:97.9 °F (36.6 °C)          General appearance -communicates appropriately sometimes, in no acute distress  Mental status - not oriented to person, place, and time with normal affect  Head - normocephalic and atraumatic  Eyes - extraocular eye movements intact, conjunctiva clear  Ears - ext ears normal  Nose - no drainage noted  Mouth - mucous membranes moist  Neck - supple, no carotid bruits, thyroid not palpable  Chest - clear to auscultation, normal effort  Heart - normal rate, regular rhythm, no murmur  Abdomen - soft, nontender, nondistended, bowel sounds present all four quadrants, no masses, hepatomegaly or splenomegaly  Neurological - difficult to assess  Extremities - peripheral pulses palpable, no pedal edema or calf pain with palpation  Skin - no gross lesions, rashes, or induration noted        Data:     Labs:    Hematology:  Recent Labs     02/15/23  0539 02/16/23  0616 02/17/23  0624   WBC 7.2 7.3 6.9   RBC 2.94* 2.94* 3.01*   HGB 8.7* 8.6* 8.8*   HCT 28.1* 28.2* 28.7*   MCV 95.6 95.9 95.3   MCH 29.6 29.3 29.2   MCHC 31.0 30.5 30.7   RDW 13.1 13.3 13.5    361 361   MPV 9.2 9.3 9.2       Chemistry:  Recent Labs     02/15/23  0539 02/16/23  0616 02/17/23  0624    143 142   K 3.8 3.8 4.0   * 112* 110*   CO2 23 23 23   GLUCOSE 128* 125* 130*   BUN 14 14 14   CREATININE 0.53 0.46* 0.54   ANIONGAP 7* 8* 9   LABGLOM >60 >60 >60   CALCIUM 8.7 8.7 9.0       Recent Labs     02/15/23  1354   POCGLU 128*         Lab Results   Component Value Date    INR 1.2 02/10/2023    INR 1.0 10/06/2022    PROTIME 15.0 (H) 02/10/2023    PROTIME 10.2 10/06/2022       Lab Results   Component Value Date/Time    SPECIAL RFA 12ML 02/09/2023 06:20 PM     Lab Results   Component Value Date/Time    CULTURE PROTEUS MIRABILIS HEAVY GROWTH (A) 02/13/2023 02:29 PM    CULTURE ESCHERICHIA COLI HEAVY GROWTH (A) 02/13/2023 02:29 PM    CULTURE MIXED ANAEROBIC WIL 02/13/2023 02:29 PM    CULTURE NORMAL SKIN WIL 02/13/2023 02:29 PM       No results found for: POCPH, PHART, PH, POCPCO2, MVM4OXU, PCO2, POCPO2, PO2ART, PO2, POCHCO3, AIW9UPT, HCO3, NBEA, PBEA, BEART, BE, THGBART, THB, MUX6FVP, VYAZ3XQI, C3UWQNXQ, O2SAT, FIO2    Radiology:    No results found.       All radiological studies reviewed                Code Status:  Prior    Electronically signed by Delfin Oleary MD on 2/17/2023 at 6:19 PM     Copy sent to Dr. Cehma Maxwell    This note was created with the assistance of a speech-recognition program.  Although the intention is to generate a document that actually reflects the content of the visit, no guarantees can be provided that every mistake has been identified and corrected by editing. Note was updated later by me after  physical examination and  completion of the assessment.

## 2023-02-17 NOTE — CARE COORDINATION
Social work: spoke to [facility] they are expecting pt at 3 pm . Fax to [facility] fax [phone] and Report [phone] son and left message for 3 pm  time. When tamara is completed will fax.  [facility]

## 2023-02-17 NOTE — PROGRESS NOTES
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 Continue intravenous antimicrobial therapy with ceftriaxone and oral metronidazole for 4 weeks  Continue wound care and plan is for placement at a skilled nursing facility    Infection Control Recommendations:   Universal precautions    Discharge Planning:   Estimated Length of IV antimicrobials: 4 weeks  Patient will need Midline Catheter Insertion/ PICC line Insertion: No  Patient will need: SNF  Patient willneed outpatient wound care: Yes    Medical Decision making / Summary of Stay:   Velma Rogers is a 81y.o.-year-old female who was initially admitted on 2/9/2023. Clay Ha has history of dementia, hard of hearing, hypertension, hyperlipidemia, anemia, and GERD. She resides at [address] and it is my understanding that in December 2022 she had COVID-19 virus infection and since that time has not really recovered in terms of her ADLs and being herself. She has been more sedentary and it is my understanding that a few weeks ago she was found to have a skin tear by her gluteal area. This has subsequently progressed and when the patient was seen by the wound care nurse there was concern for infection and the patient was sent into the emergency department for further evaluation. The patient has been admitted for an infected gluteal ulceration and I was asked to evaluate and help with antibiotic choice.      The patient at this point in time is nonverbal, not really able to give me any history and the history it obtained from reviewing the chart and discussing the care with the nurse. She was taken to the operating room 2023 and had debridement of devitalized tissue but the base did have some devitalized muscle overlying the sacrum and that the risk of exposing the periosteum was left along and superficially cauterized. A wound VAC was placed    Current evaluation:2023    BP (!) 150/62   Pulse 96   Temp 97.7 °F (36.5 °C) (Oral)   Resp 18   Ht 5' 6\" (1.676 m)   Wt 136 lb 3.2 oz (61.8 kg)   SpO2 100%   BMI 21.98 kg/m²     Temperature Range: Temp: 97.7 °F (36.5 °C) Temp  Av.7 °F (36.5 °C)  Min: 97.7 °F (36.5 °C)  Max: 97.7 °F (36.5 °C)  The patient is seen and evaluated at bedside and is awake and alert in no acute distress. She does not have any subjective complaints. She continues with the wound VAC. Review of Systems   Unable to perform ROS: Dementia     Physical Examination :     Physical Exam  Constitutional:       Appearance: She is well-developed. She is cachectic. HENT:      Head: Normocephalic and atraumatic. Mouth/Throat:      Mouth: Mucous membranes are dry. Cardiovascular:      Rate and Rhythm: Regular rhythm. Heart sounds: Murmur heard. Pulmonary:      Effort: Pulmonary effort is normal.      Breath sounds: Normal breath sounds. Abdominal:      General: Bowel sounds are normal.      Palpations: Abdomen is soft. Musculoskeletal:      Cervical back: Neck supple. Skin:     General: Skin is warm and dry. Comments: The gluteal wound VAC is in place and was not removed   Neurological:      Mental Status: She is alert.    Picture of the gluteal surgical wound attached below      Laboratory data:   I have independently reviewed the followinglabs:  CBC with Differential:   Recent Labs     23  0616 23  0624   WBC 7.3 6.9   HGB 8.6* 8.8*   HCT 28.2* 28.7*  361   LYMPHOPCT 18* 16*   MONOPCT 13* 12       BMP:   Recent Labs     02/16/23  0616 02/17/23  0624    142   K 3.8 4.0   * 110*   CO2 23 23   BUN 14 14   CREATININE 0.46* 0.54       Hepatic Function Panel:   No results for input(s): PROT, LABALBU, BILIDIR, IBILI, BILITOT, ALKPHOS, ALT, AST in the last 72 hours. No results found for: PROCAL  Lab Results   Component Value Date/Time    CRP 73.1 02/09/2023 06:25 PM     Lab Results   Component Value Date    SEDRATE 82 (H) 02/09/2023     No results found for: DDIMER  No results found for: FERRITIN  No results found for: LDH  No results found for: FIBRINOGEN    No results found for requested labs within last 30 days. No results found for: COVID19    No results for input(s): VANCOTROUGH in the last 72 hours. Imaging Studies:   No new imaging    Cultures:     Culture, Tissue [[id number]] (Abnormal)  Collected: 02/13/23 1429   Order Status: Completed Specimen: Tissue from Sacrum Updated: 02/15/23 1107    Specimen Description . BACK . WOUND    Direct Exam MODERATE NEUTROPHILS Abnormal      MIXED BACTERIAL MORPHOTYPES SEEN ON GRAM STAIN.  Abnormal     Culture PROTEUS MIRABILIS HEAVY GROWTH Abnormal      MIXED ANAEROBIC WIL     NORMAL SKIN WIL     Susceptibility    Proteus mirabilis (1)    Antibiotic Interpretation Microscan Method Status    ampicillin Sensitive <=2 BACTERIAL SUSCEPTIBILITY PANEL JANET Preliminary    ceFAZolin Sensitive 8 BACTERIAL SUSCEPTIBILITY PANEL JANET Preliminary    cefTRIAXone Sensitive <=0.25 BACTERIAL SUSCEPTIBILITY PANEL JANET Preliminary    gentamicin Sensitive <=1 BACTERIAL SUSCEPTIBILITY PANEL JANET Preliminary    levofloxacin Sensitive <=0.12 BACTERIAL SUSCEPTIBILITY PANEL JANET Preliminary    piperacillin-tazobactam Sensitive <=4 BACTERIAL SUSCEPTIBILITY PANEL JANET Preliminary    tobramycin Sensitive <=1 BACTERIAL SUSCEPTIBILITY PANEL JANET Preliminary    trimethoprim-sulfamethoxazole Sensitive <=20 BACTERIAL SUSCEPTIBILITY PANEL JANET Preliminary          Specimen Collected: 02/13/23 14:29 EST Last Resulted: 02/15/23 11:06 EST        Culture, Anaerobic and Aerobic [[id number]] (Abnormal) Collected: 02/09/23 1809   Order Status: Completed Specimen: Coccyx Updated: 02/15/23 0740    Specimen Description . COCCYX    Direct Exam FEW NEUTROPHILS Abnormal      MIXED BACTERIAL MORPHOTYPES SEEN ON GRAM STAIN. Abnormal     Culture MULTIPLE SPECIES OF AEROBIC AND ANAEROBIC ENTERIC WIL   Blood Culture 1 [[id number]] Collected: 02/09/23 1805   Order Status: Completed Specimen: Blood Updated: 02/14/23 2143    Specimen Description . BLOOD    Special Requests RAR 12ML    Culture NO GROWTH 5 DAYS   Culture, Blood 2 [[id number]] Collected: 02/09/23 1820   Order Status: Completed Specimen: Blood Updated: 02/14/23 2143    Specimen Description . BLOOD    Special Requests RFA 12ML    Culture NO GROWTH 5 DAYS     Medications:      cefTRIAXone (ROCEPHIN) IV  2,000 mg IntraVENous Q24H    miconazole   Topical BID    sodium hypochlorite   Irrigation BID    lisinopril  20 mg Oral Daily    levothyroxine  112 mcg Oral QAM AC    sodium chloride flush  5-40 mL IntraVENous 2 times per day    enoxaparin  40 mg SubCUTAneous Daily    famotidine  20 mg Oral Daily       Electronically signed by Almaz Davidson MD on 2/17/2023 at 10:08 AM      [facility]  Almaz Davidson MD  [employer]  OFFICE: [phone]    Thank you for allowing us to participate in the care of this patient. Please call with questions. This note is created with the assistance of a speech recognition program.  While intending to generate a document that actually reflects the content of the visit, the document can still have some errors including those of syntax and sound a like substitutions which may escape proof reading. In such instances, actual meaning can be extrapolated by contextual diversion.

## (undated) DEVICE — DRAPE,UTILITY,TAPE,15X26,STERILE: Brand: MEDLINE

## (undated) DEVICE — SPONGE LAP W18XL18IN WHT COT 4 PLY FLD STRUNG RADPQ DISP ST

## (undated) DEVICE — BLANKET WRM W40.2XL55.9IN IORT LO BODY + MISTRAL AIR

## (undated) DEVICE — GARMENT,MEDLINE,DVT,INT,CALF,MED, GEN2: Brand: MEDLINE

## (undated) DEVICE — INTENDED FOR TISSUE SEPARATION, AND OTHER PROCEDURES THAT REQUIRE A SHARP SURGICAL BLADE TO PUNCTURE OR CUT.: Brand: BARD-PARKER ® CARBON RIB-BACK BLADES

## (undated) DEVICE — SYRINGE IRRIG 60ML SFT PLIABLE BLB EZ TO GRP 1 HND USE W/

## (undated) DEVICE — GLOVE SURG SZ 7 CRM LTX FREE POLYISOPRENE POLYMER BEAD ANTI

## (undated) DEVICE — SOLUTION IV IRRIG POUR BRL 0.9% SODIUM CHL 2F7124

## (undated) DEVICE — GOWN,AURORA,NONRNF,XL,30/CS: Brand: MEDLINE

## (undated) DEVICE — CONTAINER,SPECIMEN,OR STERILE,4OZ: Brand: MEDLINE

## (undated) DEVICE — MINOR BSIN PK

## (undated) DEVICE — COVER LT HNDL BLU PLAS

## (undated) DEVICE — SUTURE VCRL SZ 3-0 L27IN ABSRB VLT L26MM SH 1/2 CIR J316H